# Patient Record
Sex: MALE | Race: WHITE | NOT HISPANIC OR LATINO | Employment: OTHER | URBAN - METROPOLITAN AREA
[De-identification: names, ages, dates, MRNs, and addresses within clinical notes are randomized per-mention and may not be internally consistent; named-entity substitution may affect disease eponyms.]

---

## 2017-08-15 ENCOUNTER — OFFICE VISIT (OUTPATIENT)
Dept: URGENT CARE | Facility: MEDICAL CENTER | Age: 67
End: 2017-08-15
Payer: COMMERCIAL

## 2017-08-15 PROCEDURE — G0463 HOSPITAL OUTPT CLINIC VISIT: HCPCS

## 2017-08-15 PROCEDURE — 99213 OFFICE O/P EST LOW 20 MIN: CPT

## 2017-09-27 ENCOUNTER — APPOINTMENT (OUTPATIENT)
Dept: RADIOLOGY | Facility: MEDICAL CENTER | Age: 67
End: 2017-09-27
Attending: PHYSICIAN ASSISTANT
Payer: COMMERCIAL

## 2017-09-27 ENCOUNTER — OFFICE VISIT (OUTPATIENT)
Dept: URGENT CARE | Facility: MEDICAL CENTER | Age: 67
End: 2017-09-27
Payer: COMMERCIAL

## 2017-09-27 DIAGNOSIS — M65.9 SYNOVITIS AND TENOSYNOVITIS: ICD-10-CM

## 2017-09-27 PROCEDURE — 73130 X-RAY EXAM OF HAND: CPT

## 2017-09-27 PROCEDURE — 99213 OFFICE O/P EST LOW 20 MIN: CPT

## 2017-10-03 NOTE — PROGRESS NOTES
Assessment  1  Dupuytren's contracture (728 6) (M72 0)    Plan  Dupuytren's contracture    · *1 - [facility]  *  Status: Active  Requested for:  [id number]  Care Summary provided  : Yes  Flexor tenosynovitis of finger    · * XR HAND 3+ VIEW LEFT; Status:Active; Requested List [facility] [place] [facility] according to the [facility]:[id number];     f/u with Ortho/hand  may benefit from steroid injection vs PT/OT  nsaids and ice for now  Chief Complaint  1  Hand Problem  Chief Complaint Free Text Note Form: Pt presents with lt hand swelling x 3 weeks after hitting it with ladder  Pt states he can barely move hit fingers  History of Present Illness  HPI: 78 y/o M c/o L hand pain and unable to move his middle finger today  3 weeks ago got hand caught between extension ladder rungs but this is only really bothering him today  LHD  he is renovating a house  no fever  Hospital Based Practices Required Assessment:   Pain Assessment   the patient states they have pain  The pain is located in the lt hand pain  The patient describes the pain as aching  (on a scale of 0 to 10, the patient rates the pain at 6 )   Abuse And Domestic Violence Screen    Yes, the patient is safe at home -The patient states no one is hurting them  Depression And Suicide Screen  No, the patient has not had thoughts of hurting themself  No, the patient has not felt depressed in the past 7 days  Prefered Language is  english  Primary Language is  english  Readiness To Learn: Receptive  Barriers To Learning: none  Preferred Learning: verbal      Active Problems  1  Chalazion of left eye (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 2) (H00 16)    Past Medical History  1  History of hypertension (V12 59) (Z86 79)    Family History  Father    1  Family history of    2  Family history of malignant neoplasm (V16 9) (Z80 9)    Social History   · Never a smoker   · No illicit drug use   · Nonsmoker (V49 89) (Z78 9)   · Occasional alcohol use    Surgical History  1   History of Appendectomy    Current Meds   1  Losartan Potassium 50 MG Oral Tablet; Therapy: (Recorded:52Equ1221) to Recorded    Allergies  1  No Known Drug Allergies    Vitals  Signs   Recorded: [id number] 07:53PM   Temperature: 98 3 F, Temporal  Heart Rate: 95  Respiration: 16  Systolic: 064, RUE, Sitting  Diastolic: 82, RUE, Sitting  Weight: 204 lb   BMI Calculated: 29 27  BSA Calculated: 2 1  O2 Saturation: 95, RA  Pain Scale: 6    Physical Exam    Constitutional   General appearance: No acute distress, well appearing and well nourished  Musculoskeletal   Inspection/palpation of joints, bones, and muscles: Abnormal  -L hand swollen over middle finger and 3rd MCP joint dorsally and palmar  has palmar flexion nodule and contracture of middle finger  painful passive ext no active ext  it is flexed at MCP to 15 degrees  no pain with passive flex  no erythema or fluctuance  Results/Data  Diagnostic Studies Reviewed: I personally reviewed the films/images/results in the office today  My interpretation follows  X-ray Review XR L hand no acute findings        Signatures   Electronically signed by : Stevenson Cranker, [facility]; Sep 27 2017  8:39PM EST                       (Author)    Electronically signed by : KIMBERLEY Holder ; Oct  2 2017  1:21PM EST                       (Co-author)

## 2017-11-20 ENCOUNTER — ALLSCRIPTS OFFICE VISIT (OUTPATIENT)
Dept: OTHER | Facility: OTHER | Age: 67
End: 2017-11-20

## 2017-11-20 DIAGNOSIS — R97.20 ELEVATED PROSTATE SPECIFIC ANTIGEN (PSA): ICD-10-CM

## 2017-11-20 DIAGNOSIS — I10 ESSENTIAL (PRIMARY) HYPERTENSION: ICD-10-CM

## 2018-01-13 VITALS
SYSTOLIC BLOOD PRESSURE: 116 MMHG | BODY MASS INDEX: 29.54 KG/M2 | DIASTOLIC BLOOD PRESSURE: 74 MMHG | HEIGHT: 70 IN | OXYGEN SATURATION: 96 % | HEART RATE: 78 BPM | WEIGHT: 206.38 LBS

## 2018-01-29 DIAGNOSIS — Z13.1 SCREENING FOR DIABETES MELLITUS: Primary | ICD-10-CM

## 2018-01-29 DIAGNOSIS — I10 BENIGN ESSENTIAL HYPERTENSION: Chronic | ICD-10-CM

## 2018-01-29 DIAGNOSIS — E55.9 VITAMIN D DEFICIENCY: Chronic | ICD-10-CM

## 2018-01-29 PROBLEM — R97.20 ELEVATED PSA: Status: ACTIVE | Noted: 2017-11-20

## 2018-01-29 RX ORDER — LOSARTAN POTASSIUM 50 MG/1
TABLET ORAL
COMMUNITY
End: 2018-01-29 | Stop reason: SDUPTHER

## 2018-01-29 RX ORDER — LOSARTAN POTASSIUM 50 MG/1
50 TABLET ORAL DAILY
Qty: 30 TABLET | Refills: 5 | Status: SHIPPED | OUTPATIENT
Start: 2018-01-29 | End: 2018-02-01 | Stop reason: SDUPTHER

## 2018-01-29 RX ORDER — LOSARTAN POTASSIUM 50 MG/1
TABLET ORAL
Refills: 0 | Status: CANCELLED | OUTPATIENT
Start: 2018-01-29

## 2018-02-01 ENCOUNTER — TELEPHONE (OUTPATIENT)
Dept: INTERNAL MEDICINE CLINIC | Facility: CLINIC | Age: 68
End: 2018-02-01

## 2018-02-01 DIAGNOSIS — I10 BENIGN ESSENTIAL HYPERTENSION: Chronic | ICD-10-CM

## 2018-02-01 RX ORDER — LOSARTAN POTASSIUM 50 MG/1
50 TABLET ORAL DAILY
Qty: 90 TABLET | Refills: 3
Start: 2018-02-01 | End: 2018-02-17 | Stop reason: SDUPTHER

## 2018-02-01 NOTE — TELEPHONE ENCOUNTER
PT  STOPPED  BY  SAID  HIS  RX  WAS  SENT TO  [facility]  AND  HE  WANTED  IT  TO GO  TO  [facility]    MARCOS WAS  THE  RX  PT  OF  [facility]

## 2018-02-02 ENCOUNTER — APPOINTMENT (OUTPATIENT)
Dept: LAB | Facility: CLINIC | Age: 68
End: 2018-02-02
Payer: COMMERCIAL

## 2018-02-02 ENCOUNTER — TRANSCRIBE ORDERS (OUTPATIENT)
Dept: LAB | Facility: CLINIC | Age: 68
End: 2018-02-02

## 2018-02-02 DIAGNOSIS — E55.9 VITAMIN D DEFICIENCY: Chronic | ICD-10-CM

## 2018-02-02 DIAGNOSIS — R97.20 ELEVATED PROSTATE SPECIFIC ANTIGEN (PSA): ICD-10-CM

## 2018-02-02 DIAGNOSIS — Z13.1 SCREENING FOR DIABETES MELLITUS: ICD-10-CM

## 2018-02-02 DIAGNOSIS — I10 ESSENTIAL (PRIMARY) HYPERTENSION: ICD-10-CM

## 2018-02-02 LAB
25(OH)D3 SERPL-MCNC: 16.8 NG/ML (ref 30–100)
ALBUMIN SERPL BCP-MCNC: 3.9 G/DL (ref 3.5–5)
ALP SERPL-CCNC: 77 U/L (ref 46–116)
ALT SERPL W P-5'-P-CCNC: 21 U/L (ref 12–78)
ANION GAP SERPL CALCULATED.3IONS-SCNC: 5 MMOL/L (ref 4–13)
AST SERPL W P-5'-P-CCNC: 14 U/L (ref 5–45)
BILIRUB SERPL-MCNC: 1.18 MG/DL (ref 0.2–1)
BUN SERPL-MCNC: 12 MG/DL (ref 5–25)
CALCIUM SERPL-MCNC: 8.7 MG/DL (ref 8.3–10.1)
CHLORIDE SERPL-SCNC: 110 MMOL/L (ref 100–108)
CHOLEST SERPL-MCNC: 145 MG/DL (ref 50–200)
CO2 SERPL-SCNC: 26 MMOL/L (ref 21–32)
CREAT SERPL-MCNC: 0.9 MG/DL (ref 0.6–1.3)
ERYTHROCYTE [DISTWIDTH] IN BLOOD BY AUTOMATED COUNT: 14.3 % (ref 11.6–15.1)
EST. AVERAGE GLUCOSE BLD GHB EST-MCNC: 120 MG/DL
GFR SERPL CREATININE-BSD FRML MDRD: 88 ML/MIN/1.73SQ M
GLUCOSE P FAST SERPL-MCNC: 76 MG/DL (ref 65–99)
HBA1C MFR BLD: 5.8 % (ref 4.2–6.3)
HCT VFR BLD AUTO: 50.9 % (ref 36.5–49.3)
HDLC SERPL-MCNC: 36 MG/DL (ref 40–60)
HGB BLD-MCNC: 17.1 G/DL (ref 12–17)
LDLC SERPL CALC-MCNC: 83 MG/DL (ref 0–100)
MCH RBC QN AUTO: 29.4 PG (ref 26.8–34.3)
MCHC RBC AUTO-ENTMCNC: 33.6 G/DL (ref 31.4–37.4)
MCV RBC AUTO: 88 FL (ref 82–98)
PLATELET # BLD AUTO: 151 THOUSANDS/UL (ref 149–390)
PMV BLD AUTO: 12.5 FL (ref 8.9–12.7)
POTASSIUM SERPL-SCNC: 4 MMOL/L (ref 3.5–5.3)
PROT SERPL-MCNC: 7.5 G/DL (ref 6.4–8.2)
PSA SERPL-MCNC: 2.4 NG/ML (ref 0–4)
RBC # BLD AUTO: 5.81 MILLION/UL (ref 3.88–5.62)
SODIUM SERPL-SCNC: 141 MMOL/L (ref 136–145)
TRIGL SERPL-MCNC: 130 MG/DL
WBC # BLD AUTO: 6.6 THOUSAND/UL (ref 4.31–10.16)

## 2018-02-02 PROCEDURE — 84153 ASSAY OF PSA TOTAL: CPT

## 2018-02-02 PROCEDURE — 82306 VITAMIN D 25 HYDROXY: CPT

## 2018-02-02 PROCEDURE — 83036 HEMOGLOBIN GLYCOSYLATED A1C: CPT

## 2018-02-02 PROCEDURE — 36415 COLL VENOUS BLD VENIPUNCTURE: CPT

## 2018-02-02 PROCEDURE — 80053 COMPREHEN METABOLIC PANEL: CPT

## 2018-02-02 PROCEDURE — 80061 LIPID PANEL: CPT

## 2018-02-02 PROCEDURE — 85027 COMPLETE CBC AUTOMATED: CPT

## 2018-02-17 ENCOUNTER — DOCUMENTATION (OUTPATIENT)
Dept: INTERNAL MEDICINE CLINIC | Facility: CLINIC | Age: 68
End: 2018-02-17

## 2018-02-17 ENCOUNTER — TELEPHONE (OUTPATIENT)
Dept: INTERNAL MEDICINE CLINIC | Facility: CLINIC | Age: 68
End: 2018-02-17

## 2018-02-17 DIAGNOSIS — I10 BENIGN ESSENTIAL HYPERTENSION: Chronic | ICD-10-CM

## 2018-02-17 RX ORDER — LOSARTAN POTASSIUM 50 MG/1
50 TABLET ORAL DAILY
Qty: 90 TABLET | Refills: 3 | Status: SHIPPED | OUTPATIENT
Start: 2018-02-17 | End: 2019-02-12 | Stop reason: SDUPTHER

## 2018-06-25 ENCOUNTER — OFFICE VISIT (OUTPATIENT)
Dept: INTERNAL MEDICINE CLINIC | Facility: CLINIC | Age: 68
End: 2018-06-25
Payer: COMMERCIAL

## 2018-06-25 VITALS
HEIGHT: 71 IN | SYSTOLIC BLOOD PRESSURE: 134 MMHG | BODY MASS INDEX: 29.76 KG/M2 | OXYGEN SATURATION: 97 % | DIASTOLIC BLOOD PRESSURE: 88 MMHG | HEART RATE: 63 BPM | WEIGHT: 212.6 LBS

## 2018-06-25 DIAGNOSIS — R73.03 PREDIABETES: ICD-10-CM

## 2018-06-25 DIAGNOSIS — E55.9 VITAMIN D DEFICIENCY: Chronic | ICD-10-CM

## 2018-06-25 DIAGNOSIS — G47.33 OBSTRUCTIVE SLEEP APNEA: Chronic | ICD-10-CM

## 2018-06-25 DIAGNOSIS — Z71.3 DIETARY COUNSELING AND SURVEILLANCE: ICD-10-CM

## 2018-06-25 DIAGNOSIS — I10 BENIGN ESSENTIAL HYPERTENSION: Primary | Chronic | ICD-10-CM

## 2018-06-25 DIAGNOSIS — Z13.31 DEPRESSION SCREENING NEGATIVE: ICD-10-CM

## 2018-06-25 PROBLEM — M72.0 DUPUYTREN'S CONTRACTURE: Status: RESOLVED | Noted: 2017-09-27 | Resolved: 2018-06-25

## 2018-06-25 PROBLEM — M18.10 ARTHRITIS OF CARPOMETACARPAL (CMC) JOINT OF THUMB: Status: RESOLVED | Noted: 2017-11-20 | Resolved: 2018-06-25

## 2018-06-25 PROBLEM — M72.0 DUPUYTREN'S CONTRACTURE: Status: ACTIVE | Noted: 2017-09-27

## 2018-06-25 PROBLEM — I44.7 LEFT BUNDLE BRANCH BLOCK: Chronic | Status: ACTIVE | Noted: 2017-11-20

## 2018-06-25 PROBLEM — I44.7 LEFT BUNDLE BRANCH BLOCK: Status: ACTIVE | Noted: 2017-11-20

## 2018-06-25 PROBLEM — R97.20 ELEVATED PSA: Status: RESOLVED | Noted: 2017-11-20 | Resolved: 2018-06-25

## 2018-06-25 PROBLEM — M18.10 ARTHRITIS OF CARPOMETACARPAL (CMC) JOINT OF THUMB: Status: ACTIVE | Noted: 2017-11-20

## 2018-06-25 PROCEDURE — 99214 OFFICE O/P EST MOD 30 MIN: CPT | Performed by: INTERNAL MEDICINE

## 2018-06-25 PROCEDURE — 3079F DIAST BP 80-89 MM HG: CPT | Performed by: INTERNAL MEDICINE

## 2018-06-25 PROCEDURE — 3725F SCREEN DEPRESSION PERFORMED: CPT | Performed by: INTERNAL MEDICINE

## 2018-06-25 PROCEDURE — 3075F SYST BP GE 130 - 139MM HG: CPT | Performed by: INTERNAL MEDICINE

## 2018-06-25 PROCEDURE — 1101F PT FALLS ASSESS-DOCD LE1/YR: CPT | Performed by: INTERNAL MEDICINE

## 2018-06-25 NOTE — PROGRESS NOTES
INTERNAL MEDICINE FOLLOW-UP OFFICE VISIT  [facility] [address] [place] [address]    NAME: Nelli Cabral  AGE: 79 y o  SEX: male  : 1950     DATE: 2018     Assessment and Plan:     1  Benign essential hypertension    Patient's blood pressure is acceptable  Discussed increasing his overall physical activity  Recommend 150 mins/week of cardiovascular exercise  Can combine with strength training  Follow heart healthy diet  Increase lean protein, fish, fruits, vegetables  Limit saturated and trans fats  - Comprehensive metabolic panel; Future  - Lipid panel; Future  - CBC and differential; Future    2  Vitamin D deficiency    Repeat vit D level in August     - Vitamin D 25 hydroxy; Future    3  Obstructive sleep apnea    Patient has no significant symptoms at this time  4  Prediabetes    A1C 5 8%  Discussed diet/exercise/weight loss  Can repeat A1C in August at the earliest     - Hemoglobin A1C; Future    Return in about 6 months (around 2018) for Follow-up  Counseling:     · Medication Side Effects - Adverse side effects of medications were reviewed with the patient/guardian today: Yes  · Counseling was given regarding: Diagnostic results, Prognosis, Risks and benefits of tx options, Intructions for management, Patient and family education, Importance of tx compliance, Risk factor reductions and Impressions  · Barriers to treatment include: No identified barriers      Chief Complaint:     Chief Complaint   Patient presents with    Annual Exam     yearly physical      History of Present Illness:     Patient presents for follow-up  He denies any chest pain, shortness of breath, abdominal pain, nausea, vomiting  No changes in his health  Taking vit D and losartan  Previous labs from February reviewed  Mild pre-diabetes at the time with A1C of 5 8%  He hasn't necessarily been watching what he is eating  He does not exercise on a regular basis   He has JAY diagnosed in past, but never wore CPAP mask  Denies any hypersomnolence  He has no vision or hearing complaints  No recent falls or depression  Doesn't want a colonoscopy  Still has cologuard order at home from November  The following portions of the patient's history were reviewed and updated as appropriate: allergies, current medications, past family history, past medical history, past social history, past surgical history and problem list      Review of Systems:     Review of Systems   Constitutional: Negative for appetite change, chills, fatigue and fever  HENT: Negative for congestion, hearing loss, postnasal drip, rhinorrhea, sore throat, tinnitus and trouble swallowing  Eyes: Negative for pain, discharge, redness and visual disturbance  Respiratory: Negative for cough, chest tightness, shortness of breath and wheezing  Cardiovascular: Negative for chest pain, palpitations and leg swelling  Gastrointestinal: Negative for abdominal distention, abdominal pain, blood in stool, constipation, diarrhea, nausea and vomiting  Endocrine: Negative for cold intolerance, heat intolerance, polydipsia, polyphagia and polyuria  Genitourinary: Negative for difficulty urinating, dysuria, frequency, hematuria and urgency  Musculoskeletal: Negative for arthralgias, back pain, gait problem, joint swelling, myalgias, neck pain and neck stiffness  Skin: Negative for color change and rash  Neurological: Negative for dizziness, tremors, seizures, syncope, speech difficulty, weakness, light-headedness, numbness and headaches  Hematological: Negative for adenopathy  Does not bruise/bleed easily  Psychiatric/Behavioral: Negative for agitation, behavioral problems, confusion, hallucinations, sleep disturbance and suicidal ideas  The patient is not nervous/anxious         Problem List:     Patient Active Problem List   Diagnosis    Benign essential hypertension    Vitamin D deficiency    Obstructive sleep apnea    Left bundle branch block      Objective:     /88 (BP Location: Left arm, Patient Position: Sitting, Cuff Size: Standard) Comment: per pt was away for a couple of days & did not take Losartan  Pulse 63   Ht 5' 11" (1 803 m)   Wt 96 4 kg (212 lb 9 6 oz)   SpO2 97%   BMI 29 65 kg/m²     Physical Exam   Constitutional: He is oriented to person, place, and time  He appears well-developed and well-nourished  No distress  Overweight   Eyes: Conjunctivae are normal  Right eye exhibits no discharge  Left eye exhibits no discharge  No scleral icterus  Neck: Neck supple  No JVD present  No thyromegaly present  Cardiovascular: Normal rate, regular rhythm, normal heart sounds and intact distal pulses  Exam reveals no gallop and no friction rub  No murmur heard  Pulmonary/Chest: Effort normal and breath sounds normal  No respiratory distress  He has no wheezes  He has no rales  He exhibits no tenderness  Abdominal: Soft  Bowel sounds are normal  He exhibits no distension and no mass  There is no tenderness  There is no rebound and no guarding  Musculoskeletal: Normal range of motion  He exhibits no edema  Lymphadenopathy:     He has no cervical adenopathy  Neurological: He is alert and oriented to person, place, and time  Skin: Skin is warm and dry  He is not diaphoretic  Psychiatric: He has a normal mood and affect   His behavior is normal      Pertinent Laboratory/Diagnostic Studies:    Laboratory Results: I have personally reviewed the pertinent laboratory results/reports     Results for orders placed or performed in visit on 02/02/18   PSA Total, Diagnostic   Result Value Ref Range    PSA 2 4 0 0 - 4 0 ng/mL   Lipid panel   Result Value Ref Range    Cholesterol 145 50 - 200 mg/dL    Triglycerides 130 <=150 mg/dL    HDL, Direct 36 (L) 40 - 60 mg/dL    LDL Calculated 83 0 - 100 mg/dL   Comprehensive metabolic panel   Result Value Ref Range    Sodium 141 136 - 145 mmol/L    Potassium 4 0 3 5 - 5 3 mmol/L    Chloride 110 (H) 100 - 108 mmol/L    CO2 26 21 - 32 mmol/L    Anion Gap 5 4 - 13 mmol/L    BUN 12 5 - 25 mg/dL    Creatinine 0 90 0 60 - 1 30 mg/dL    Glucose, Fasting 76 65 - 99 mg/dL    Calcium 8 7 8 3 - 10 1 mg/dL    AST 14 5 - 45 U/L    ALT 21 12 - 78 U/L    Alkaline Phosphatase 77 46 - 116 U/L    Total Protein 7 5 6 4 - 8 2 g/dL    Albumin 3 9 3 5 - 5 0 g/dL    Total Bilirubin 1 18 (H) 0 20 - 1 00 mg/dL    eGFR 88 ml/min/1 73sq m   CBC   Result Value Ref Range    WBC 6 60 4 31 - 10 16 Thousand/uL    RBC 5 81 (H) 3 88 - 5 62 Million/uL    Hemoglobin 17 1 (H) 12 0 - 17 0 g/dL    Hematocrit 50 9 (H) 36 5 - 49 3 %    MCV 88 82 - 98 fL    MCH 29 4 26 8 - 34 3 pg    MCHC 33 6 31 4 - 37 4 g/dL    RDW 14 3 11 6 - 15 1 %    Platelets 820 967 - 489 Thousands/uL    MPV 12 5 8 9 - 12 7 fL   Hemoglobin A1c   Result Value Ref Range    Hemoglobin A1C 5 8 4 2 - 6 3 %     mg/dl   Vitamin D 25 hydroxy   Result Value Ref Range    Vit D, 25-Hydroxy 16 8 (L) 30 0 - 100 0 ng/mL     PHQ-9  Negative for depression with PHQ2 score of 0  Fall Risk  The patient does not have a history of falls  A risk assessment for falls was completed  Current Medications:     Outpatient Medications Prior to Visit   Medication Sig Dispense Refill    losartan (COZAAR) 50 mg tablet Take 1 tablet (50 mg total) by mouth daily 90 tablet 3     No facility-administered medications prior to visit          Michelle Blanton DO  [facility] [address] [place] [address]

## 2018-08-16 LAB — COLOGUARD (HISTORICAL): NEGATIVE

## 2018-08-22 ENCOUNTER — OFFICE VISIT (OUTPATIENT)
Dept: INTERNAL MEDICINE CLINIC | Facility: CLINIC | Age: 68
End: 2018-08-22
Payer: COMMERCIAL

## 2018-08-22 VITALS
BODY MASS INDEX: 28.87 KG/M2 | HEART RATE: 64 BPM | OXYGEN SATURATION: 96 % | DIASTOLIC BLOOD PRESSURE: 62 MMHG | WEIGHT: 206.2 LBS | HEIGHT: 71 IN | SYSTOLIC BLOOD PRESSURE: 110 MMHG

## 2018-08-22 DIAGNOSIS — M79.672 LEFT FOOT PAIN: Primary | ICD-10-CM

## 2018-08-22 PROCEDURE — 1160F RVW MEDS BY RX/DR IN RCRD: CPT | Performed by: INTERNAL MEDICINE

## 2018-08-22 PROCEDURE — 1036F TOBACCO NON-USER: CPT | Performed by: INTERNAL MEDICINE

## 2018-08-22 PROCEDURE — 99213 OFFICE O/P EST LOW 20 MIN: CPT | Performed by: INTERNAL MEDICINE

## 2018-08-22 PROCEDURE — 3008F BODY MASS INDEX DOCD: CPT | Performed by: INTERNAL MEDICINE

## 2018-08-22 NOTE — PATIENT INSTRUCTIONS
Plantar Fasciitis   AMBULATORY CARE:   Plantar fasciitis  is swelling of the plantar fascia  The plantar fascia is a band of fibers that connect your heel bone to the front of your foot  It helps support the arch of your foot and absorbs shock  Plantar fasciitis is caused by small tears in the plantar fascia  Over time, the tears cause swelling and irritation  Signs and symptoms:   · Pain near your heel, especially first thing in the morning    · Pain with prolonged standing, sitting, or walking    · Redness, swelling, or warmth over the injured part of your foot  Contact your healthcare provider or podiatrist if:   · Your pain and swelling increase  · You develop knee, hip, or back pain  · You have questions or concerns about your condition or care  Treatment  may include any of the following:  · Medicines  may be given to decrease swelling and pain  Steroids may be injected into your heel to decrease swelling and pain  · Shoe inserts, splints, or tape  help support your foot and decrease stress on your plantar fascia  A night splint may help stretch your plantar fascia while you sleep  · Stretches and exercises  can help decrease pain and swelling  They can also help strengthen the muscles that support your heel and foot  · Extracorporeal shockwave therapy (ESWT)  uses sound waves to decrease swelling of the plantar fascia  ESWT may be done if other treatments do not work  · Surgery  is rarely needed to separate the plantar fascia from your heel  Self-care:   · Wear your splint or shoe inserts as directed  You may need to wear a splint at night to keep your foot stretched while you sleep  This will help prevent sharp pain first thing in the morning  Shoe inserts will help decrease stress on your plantar fascia when you walk or exercise  · Rest as directed  Rest as much as possible to decrease swelling and prevent more damage   Ask your healthcare provider when you can return to your normal activities  · Apply ice on your plantar fascia  Ice helps prevent tissue damage and decreases swelling and pain  Fill a water bottle with water and freeze it  Wrap a towel around the bottle or cover it with a pillow case  Roll the water bottle under your foot for 10 minutes in the morning and after work  · Massage your plantar fascia as directed  This may help decrease swelling and pain  Roll a golf ball under your foot for 10 minutes  Repeat 3 times each day  · Go to physical therapy as directed  A physical therapist teaches you exercises to help improve movement and strength, and to decrease pain  Prevent plantar fasciitis:   · Maintain a healthy weight  This will help decrease stress on your feet  Ask your healthcare provider how much you should weigh  Ask him to help you create a weight loss plan if you are overweight  · Do low-impact exercises  Low-impact exercises decrease stress on your plantar fascia  Examples include swimming or bicycling  · Start new activities slowly  Increase the intensity and time gradually  · Wear shoes that fit well and support your arch  Replace your shoes before the padding or shock absorption wears out  Do not walk or  bare feet or sandals for long periods of time  · Stretch before you exercise  Ask your healthcare provider how to stretch your plantar fascia and calf muscles  Follow up with your healthcare provider or podiatrist as directed:  Write down your questions so you remember to ask them during your visits  © 2017 [employer] Information is for End User's use only and may not be sold, redistributed or otherwise used for commercial purposes  All illustrations and images included in CareNotes® are the copyrighted property of A [employer]  or [employer]  The above information is an  only  It is not intended as medical advice for individual conditions or treatments   Talk to your doctor, nurse or pharmacist before following any medical regimen to see if it is safe and effective for you

## 2018-08-22 NOTE — PROGRESS NOTES
INTERNAL MEDICINE FOLLOW-UP OFFICE VISIT  [facility] [address] [place] [address]    NAME: Lion rToncoso  AGE: 76 y o  SEX: male  : 1950     DATE: 2018     Assessment and Plan:     1  Left foot pain    Check x-ray  Ice foot and elevate at night  NSAIDs prn  Discussed good shoe inserts  - Ambulatory referral to Podiatry; Future  - XR heel / calcaneus 2+ vw left; Future     Chief Complaint:     Chief Complaint   Patient presents with    Foot Injury     (L) foot      History of Present Illness:     Left heel pain x 1 month  Has been more active as of late  Denies any trauma  Pain comes and goes  Gets worse at night after he has been on his feet all day  Has tried ICE, anti-inflammatories, and massaging with tennis ball  The following portions of the patient's history were reviewed and updated as appropriate: allergies, current medications, past family history, past medical history, past social history, past surgical history and problem list      Review of Systems:     Review of Systems   Constitutional: Negative for diaphoresis, fatigue and fever  Musculoskeletal: Positive for arthralgias  Negative for back pain and gait problem  Problem List:     Patient Active Problem List   Diagnosis    Benign essential hypertension    Vitamin D deficiency    Obstructive sleep apnea    Left bundle branch block      Objective:     /62 (BP Location: Left arm, Patient Position: Sitting, Cuff Size: Standard)   Pulse 64   Ht 5' 11" (1 803 m) Comment: w/ shoe denied off  Wt 93 5 kg (206 lb 3 2 oz) Comment: w/ shoe denied off  SpO2 96%   BMI 28 76 kg/m²     Physical Exam   Constitutional: He appears well-developed and well-nourished  No distress  Musculoskeletal:        Feet:    Skin: He is not diaphoretic        Current Medications:     Current Outpatient Prescriptions   Medication Sig Dispense Refill    losartan (COZAAR) 50 mg tablet Take 1 tablet (50 mg total) by mouth daily 90 tablet 3     No current facility-administered medications for this visit          Giovanyn Marti DO  [facility]

## 2018-08-23 ENCOUNTER — APPOINTMENT (OUTPATIENT)
Dept: RADIOLOGY | Facility: CLINIC | Age: 68
End: 2018-08-23
Payer: COMMERCIAL

## 2018-08-23 ENCOUNTER — TRANSCRIBE ORDERS (OUTPATIENT)
Dept: ADMINISTRATIVE | Facility: HOSPITAL | Age: 68
End: 2018-08-23

## 2018-08-23 DIAGNOSIS — M79.672 LEFT FOOT PAIN: ICD-10-CM

## 2018-08-23 PROCEDURE — 73650 X-RAY EXAM OF HEEL: CPT

## 2018-08-25 ENCOUNTER — TELEPHONE (OUTPATIENT)
Dept: INTERNAL MEDICINE CLINIC | Facility: CLINIC | Age: 68
End: 2018-08-25

## 2018-08-25 NOTE — TELEPHONE ENCOUNTER
----- Message from Kenneth Hartman DO sent at 8/25/2018 11:53 AM EDT -----  Let patient know X-ray of foot shows some small heel spurs  Recommend he follow up with foot doctor    If he made appt with foot doctor please fax this X-ray result to the consultants office

## 2018-08-25 NOTE — TELEPHONE ENCOUNTER
Called to inform patient of Dr Xiang Montanez message  Pt said he did not make a appt yet with the foot doctor  Pt will call us once the appt is made so we can fax over xray report

## 2019-02-12 DIAGNOSIS — I10 BENIGN ESSENTIAL HYPERTENSION: Chronic | ICD-10-CM

## 2019-02-12 RX ORDER — LOSARTAN POTASSIUM 50 MG/1
TABLET ORAL
Qty: 90 TABLET | Refills: 3 | Status: SHIPPED | OUTPATIENT
Start: 2019-02-12 | End: 2019-07-29 | Stop reason: SDUPTHER

## 2019-05-14 ENCOUNTER — OFFICE VISIT (OUTPATIENT)
Dept: LAB | Facility: HOSPITAL | Age: 69
End: 2019-05-14
Attending: ORTHOPAEDIC SURGERY
Payer: COMMERCIAL

## 2019-05-14 ENCOUNTER — HOSPITAL ENCOUNTER (OUTPATIENT)
Dept: RADIOLOGY | Facility: HOSPITAL | Age: 69
Discharge: HOME | End: 2019-05-14
Attending: ORTHOPAEDIC SURGERY
Payer: COMMERCIAL

## 2019-05-14 ENCOUNTER — APPOINTMENT (OUTPATIENT)
Dept: RADIOLOGY | Facility: CLINIC | Age: 69
End: 2019-05-14
Payer: COMMERCIAL

## 2019-05-14 VITALS
HEIGHT: 72 IN | RESPIRATION RATE: 18 BRPM | WEIGHT: 211.4 LBS | BODY MASS INDEX: 28.63 KG/M2 | SYSTOLIC BLOOD PRESSURE: 154 MMHG | HEART RATE: 71 BPM | DIASTOLIC BLOOD PRESSURE: 98 MMHG

## 2019-05-14 DIAGNOSIS — M25.561 RIGHT KNEE PAIN, UNSPECIFIED CHRONICITY: Primary | ICD-10-CM

## 2019-05-14 DIAGNOSIS — S83.241A OTHER TEAR OF MEDIAL MENISCUS, CURRENT INJURY, RIGHT KNEE, INITIAL ENCOUNTER: ICD-10-CM

## 2019-05-14 DIAGNOSIS — Z01.818 ENCOUNTER FOR PREADMISSION TESTING: ICD-10-CM

## 2019-05-14 DIAGNOSIS — S83.271A COMPLEX TEAR OF LATERAL MENISCUS OF RIGHT KNEE, UNSPECIFIED WHETHER OLD OR CURRENT TEAR, INITIAL ENCOUNTER: ICD-10-CM

## 2019-05-14 DIAGNOSIS — M22.41 CHONDROMALACIA, PATELLA, RIGHT: ICD-10-CM

## 2019-05-14 DIAGNOSIS — M25.561 RIGHT KNEE PAIN, UNSPECIFIED CHRONICITY: ICD-10-CM

## 2019-05-14 DIAGNOSIS — Z01.818 ENCOUNTER FOR PREADMISSION TESTING: Primary | ICD-10-CM

## 2019-05-14 LAB
ALBUMIN SERPL BCP-MCNC: 3.7 G/DL (ref 3.5–5)
ALP SERPL-CCNC: 85 U/L (ref 46–116)
ALT SERPL W P-5'-P-CCNC: 26 U/L (ref 12–78)
ANION GAP SERPL CALCULATED.3IONS-SCNC: 5 MMOL/L (ref 4–13)
AST SERPL W P-5'-P-CCNC: 16 U/L (ref 5–45)
ATRIAL RATE: 68 BPM
BASOPHILS # BLD AUTO: 0.07 THOUSANDS/ΜL (ref 0–0.1)
BASOPHILS NFR BLD AUTO: 1 % (ref 0–1)
BILIRUB SERPL-MCNC: 0.6 MG/DL (ref 0.2–1)
BUN SERPL-MCNC: 16 MG/DL (ref 5–25)
CALCIUM SERPL-MCNC: 9.3 MG/DL (ref 8.3–10.1)
CHLORIDE SERPL-SCNC: 106 MMOL/L (ref 100–108)
CO2 SERPL-SCNC: 30 MMOL/L (ref 21–32)
CREAT SERPL-MCNC: 0.97 MG/DL (ref 0.6–1.3)
EOSINOPHIL # BLD AUTO: 0.06 THOUSAND/ΜL (ref 0–0.61)
EOSINOPHIL NFR BLD AUTO: 1 % (ref 0–6)
ERYTHROCYTE [DISTWIDTH] IN BLOOD BY AUTOMATED COUNT: 14.1 % (ref 11.6–15.1)
GFR SERPL CREATININE-BSD FRML MDRD: 80 ML/MIN/1.73SQ M
GLUCOSE P FAST SERPL-MCNC: 93 MG/DL (ref 65–99)
HCT VFR BLD AUTO: 53.6 % (ref 36.5–49.3)
HGB BLD-MCNC: 17.2 G/DL (ref 12–17)
IMM GRANULOCYTES # BLD AUTO: 0.05 THOUSAND/UL (ref 0–0.2)
IMM GRANULOCYTES NFR BLD AUTO: 1 % (ref 0–2)
LYMPHOCYTES # BLD AUTO: 2.02 THOUSANDS/ΜL (ref 0.6–4.47)
LYMPHOCYTES NFR BLD AUTO: 26 % (ref 14–44)
MCH RBC QN AUTO: 28.3 PG (ref 26.8–34.3)
MCHC RBC AUTO-ENTMCNC: 32.1 G/DL (ref 31.4–37.4)
MCV RBC AUTO: 88 FL (ref 82–98)
MONOCYTES # BLD AUTO: 0.73 THOUSAND/ΜL (ref 0.17–1.22)
MONOCYTES NFR BLD AUTO: 9 % (ref 4–12)
NEUTROPHILS # BLD AUTO: 4.86 THOUSANDS/ΜL (ref 1.85–7.62)
NEUTS SEG NFR BLD AUTO: 62 % (ref 43–75)
NRBC BLD AUTO-RTO: 0 /100 WBCS
P AXIS: 52 DEGREES
PLATELET # BLD AUTO: 139 THOUSANDS/UL (ref 149–390)
PMV BLD AUTO: 11.8 FL (ref 8.9–12.7)
POTASSIUM SERPL-SCNC: 4.5 MMOL/L (ref 3.5–5.3)
PR INTERVAL: 148 MS
PROT SERPL-MCNC: 7.4 G/DL (ref 6.4–8.2)
QRS AXIS: 14 DEGREES
QRSD INTERVAL: 134 MS
QT INTERVAL: 428 MS
QTC INTERVAL: 455 MS
RBC # BLD AUTO: 6.07 MILLION/UL (ref 3.88–5.62)
SODIUM SERPL-SCNC: 141 MMOL/L (ref 136–145)
T WAVE AXIS: 33 DEGREES
VENTRICULAR RATE: 68 BPM
WBC # BLD AUTO: 7.79 THOUSAND/UL (ref 4.31–10.16)

## 2019-05-14 PROCEDURE — 85025 COMPLETE CBC W/AUTO DIFF WBC: CPT

## 2019-05-14 PROCEDURE — 36415 COLL VENOUS BLD VENIPUNCTURE: CPT

## 2019-05-14 PROCEDURE — 71046 X-RAY EXAM CHEST 2 VIEWS: CPT

## 2019-05-14 PROCEDURE — 99214 OFFICE O/P EST MOD 30 MIN: CPT | Performed by: ORTHOPAEDIC SURGERY

## 2019-05-14 PROCEDURE — 93010 ELECTROCARDIOGRAM REPORT: CPT | Performed by: INTERNAL MEDICINE

## 2019-05-14 PROCEDURE — 73562 X-RAY EXAM OF KNEE 3: CPT

## 2019-05-14 PROCEDURE — 93005 ELECTROCARDIOGRAM TRACING: CPT

## 2019-05-14 PROCEDURE — 80053 COMPREHEN METABOLIC PANEL: CPT

## 2019-05-14 RX ORDER — MAG HYDROX/ALUMINUM HYD/SIMETH 400-400-40
SUSPENSION, ORAL (FINAL DOSE FORM) ORAL DAILY
COMMUNITY

## 2019-05-15 ENCOUNTER — OFFICE VISIT (OUTPATIENT)
Dept: INTERNAL MEDICINE CLINIC | Facility: CLINIC | Age: 69
End: 2019-05-15
Payer: COMMERCIAL

## 2019-05-15 ENCOUNTER — TELEPHONE (OUTPATIENT)
Dept: INTERNAL MEDICINE CLINIC | Facility: CLINIC | Age: 69
End: 2019-05-15

## 2019-05-15 VITALS
OXYGEN SATURATION: 96 % | TEMPERATURE: 97.5 F | DIASTOLIC BLOOD PRESSURE: 90 MMHG | HEIGHT: 71 IN | WEIGHT: 211.5 LBS | HEART RATE: 79 BPM | SYSTOLIC BLOOD PRESSURE: 138 MMHG | BODY MASS INDEX: 29.61 KG/M2

## 2019-05-15 DIAGNOSIS — S83.271D COMPLEX TEAR OF LATERAL MENISCUS OF RIGHT KNEE, UNSPECIFIED WHETHER OLD OR CURRENT TEAR, SUBSEQUENT ENCOUNTER: ICD-10-CM

## 2019-05-15 DIAGNOSIS — Z01.818 PREOPERATIVE CLEARANCE: Primary | ICD-10-CM

## 2019-05-15 PROBLEM — M25.561 RIGHT KNEE PAIN: Status: ACTIVE | Noted: 2019-05-15

## 2019-05-15 PROBLEM — S83.271A COMPLEX TEAR OF LATERAL MENISCUS OF RIGHT KNEE: Status: ACTIVE | Noted: 2019-05-15

## 2019-05-15 PROBLEM — S83.241A OTHER TEAR OF MEDIAL MENISCUS, CURRENT INJURY, RIGHT KNEE, INITIAL ENCOUNTER: Status: ACTIVE | Noted: 2019-05-15

## 2019-05-15 PROCEDURE — 3725F SCREEN DEPRESSION PERFORMED: CPT | Performed by: PHYSICIAN ASSISTANT

## 2019-05-15 PROCEDURE — 1160F RVW MEDS BY RX/DR IN RCRD: CPT | Performed by: PHYSICIAN ASSISTANT

## 2019-05-15 PROCEDURE — 99214 OFFICE O/P EST MOD 30 MIN: CPT | Performed by: PHYSICIAN ASSISTANT

## 2019-05-15 PROCEDURE — 1101F PT FALLS ASSESS-DOCD LE1/YR: CPT | Performed by: PHYSICIAN ASSISTANT

## 2019-05-22 ENCOUNTER — ANESTHESIA EVENT (OUTPATIENT)
Dept: PERIOP | Facility: HOSPITAL | Age: 69
End: 2019-05-22
Payer: COMMERCIAL

## 2019-05-23 ENCOUNTER — HOSPITAL ENCOUNTER (OUTPATIENT)
Facility: HOSPITAL | Age: 69
Setting detail: OUTPATIENT SURGERY
Discharge: HOME | End: 2019-05-23
Attending: ORTHOPAEDIC SURGERY | Admitting: ORTHOPAEDIC SURGERY
Payer: COMMERCIAL

## 2019-05-23 ENCOUNTER — ANESTHESIA (OUTPATIENT)
Dept: PERIOP | Facility: HOSPITAL | Age: 69
End: 2019-05-23
Payer: COMMERCIAL

## 2019-05-23 VITALS
DIASTOLIC BLOOD PRESSURE: 89 MMHG | SYSTOLIC BLOOD PRESSURE: 144 MMHG | RESPIRATION RATE: 24 BRPM | WEIGHT: 209.22 LBS | HEART RATE: 91 BPM | BODY MASS INDEX: 29.29 KG/M2 | HEIGHT: 71 IN | TEMPERATURE: 97.1 F | OXYGEN SATURATION: 94 %

## 2019-05-23 DIAGNOSIS — Z98.890 STATUS POST ARTHROSCOPIC KNEE SURGERY: Primary | ICD-10-CM

## 2019-05-23 PROCEDURE — 29880 ARTHRS KNE SRG MNISECTMY M&L: CPT | Performed by: PHYSICIAN ASSISTANT

## 2019-05-23 PROCEDURE — 29880 ARTHRS KNE SRG MNISECTMY M&L: CPT | Performed by: ORTHOPAEDIC SURGERY

## 2019-05-23 RX ORDER — CEPHALEXIN 500 MG/1
500 CAPSULE ORAL 4 TIMES DAILY
Qty: 4 CAPSULE | Refills: 0 | Status: SHIPPED | OUTPATIENT
Start: 2019-05-23 | End: 2019-05-24

## 2019-05-23 RX ORDER — METHYLPREDNISOLONE ACETATE 40 MG/ML
INJECTION, SUSPENSION INTRA-ARTICULAR; INTRALESIONAL; INTRAMUSCULAR; SOFT TISSUE AS NEEDED
Status: DISCONTINUED | OUTPATIENT
Start: 2019-05-23 | End: 2019-05-23 | Stop reason: HOSPADM

## 2019-05-23 RX ORDER — BUPIVACAINE HYDROCHLORIDE 5 MG/ML
INJECTION, SOLUTION EPIDURAL; INTRACAUDAL AS NEEDED
Status: DISCONTINUED | OUTPATIENT
Start: 2019-05-23 | End: 2019-05-23 | Stop reason: HOSPADM

## 2019-05-23 RX ORDER — METOCLOPRAMIDE HYDROCHLORIDE 5 MG/ML
10 INJECTION INTRAMUSCULAR; INTRAVENOUS ONCE AS NEEDED
Status: DISCONTINUED | OUTPATIENT
Start: 2019-05-23 | End: 2019-05-23 | Stop reason: HOSPADM

## 2019-05-23 RX ORDER — KETAMINE HYDROCHLORIDE 50 MG/ML
INJECTION, SOLUTION, CONCENTRATE INTRAMUSCULAR; INTRAVENOUS AS NEEDED
Status: DISCONTINUED | OUTPATIENT
Start: 2019-05-23 | End: 2019-05-23 | Stop reason: SURG

## 2019-05-23 RX ORDER — ONDANSETRON 2 MG/ML
INJECTION INTRAMUSCULAR; INTRAVENOUS AS NEEDED
Status: DISCONTINUED | OUTPATIENT
Start: 2019-05-23 | End: 2019-05-23 | Stop reason: SURG

## 2019-05-23 RX ORDER — PROPOFOL 10 MG/ML
INJECTION, EMULSION INTRAVENOUS AS NEEDED
Status: DISCONTINUED | OUTPATIENT
Start: 2019-05-23 | End: 2019-05-23 | Stop reason: SURG

## 2019-05-23 RX ORDER — HYDROMORPHONE HCL/PF 1 MG/ML
0.2 SYRINGE (ML) INJECTION
Status: DISCONTINUED | OUTPATIENT
Start: 2019-05-23 | End: 2019-05-23 | Stop reason: HOSPADM

## 2019-05-23 RX ORDER — GLYCOPYRROLATE 0.2 MG/ML
INJECTION INTRAMUSCULAR; INTRAVENOUS AS NEEDED
Status: DISCONTINUED | OUTPATIENT
Start: 2019-05-23 | End: 2019-05-23 | Stop reason: SURG

## 2019-05-23 RX ORDER — FENTANYL CITRATE/PF 50 MCG/ML
50 SYRINGE (ML) INJECTION
Status: COMPLETED | OUTPATIENT
Start: 2019-05-23 | End: 2019-05-23

## 2019-05-23 RX ORDER — SODIUM CHLORIDE, SODIUM LACTATE, POTASSIUM CHLORIDE, CALCIUM CHLORIDE 600; 310; 30; 20 MG/100ML; MG/100ML; MG/100ML; MG/100ML
125 INJECTION, SOLUTION INTRAVENOUS CONTINUOUS
Status: DISCONTINUED | OUTPATIENT
Start: 2019-05-23 | End: 2019-05-23 | Stop reason: HOSPADM

## 2019-05-23 RX ORDER — FENTANYL CITRATE 50 UG/ML
INJECTION, SOLUTION INTRAMUSCULAR; INTRAVENOUS AS NEEDED
Status: DISCONTINUED | OUTPATIENT
Start: 2019-05-23 | End: 2019-05-23 | Stop reason: SURG

## 2019-05-23 RX ORDER — ONDANSETRON 2 MG/ML
4 INJECTION INTRAMUSCULAR; INTRAVENOUS ONCE AS NEEDED
Status: DISCONTINUED | OUTPATIENT
Start: 2019-05-23 | End: 2019-05-23 | Stop reason: HOSPADM

## 2019-05-23 RX ORDER — MELOXICAM 15 MG/1
15 TABLET ORAL DAILY
Qty: 30 TABLET | Refills: 0 | Status: SHIPPED | OUTPATIENT
Start: 2019-05-23 | End: 2020-07-02 | Stop reason: CLARIF

## 2019-05-23 RX ORDER — DEXAMETHASONE SODIUM PHOSPHATE 10 MG/ML
INJECTION, SOLUTION INTRAMUSCULAR; INTRAVENOUS AS NEEDED
Status: DISCONTINUED | OUTPATIENT
Start: 2019-05-23 | End: 2019-05-23 | Stop reason: SURG

## 2019-05-23 RX ORDER — KETOROLAC TROMETHAMINE 30 MG/ML
INJECTION, SOLUTION INTRAMUSCULAR; INTRAVENOUS AS NEEDED
Status: DISCONTINUED | OUTPATIENT
Start: 2019-05-23 | End: 2019-05-23 | Stop reason: SURG

## 2019-05-23 RX ORDER — EPHEDRINE SULFATE 50 MG/ML
INJECTION INTRAVENOUS AS NEEDED
Status: DISCONTINUED | OUTPATIENT
Start: 2019-05-23 | End: 2019-05-23 | Stop reason: SURG

## 2019-05-23 RX ORDER — OXYCODONE HYDROCHLORIDE 5 MG/1
5 TABLET ORAL EVERY 4 HOURS PRN
Qty: 20 TABLET | Refills: 0 | Status: SHIPPED | OUTPATIENT
Start: 2019-05-23 | End: 2019-05-30

## 2019-05-23 RX ORDER — SODIUM CHLORIDE, SODIUM LACTATE, POTASSIUM CHLORIDE, AND CALCIUM CHLORIDE .6; .31; .03; .02 G/100ML; G/100ML; G/100ML; G/100ML
IRRIGANT IRRIGATION AS NEEDED
Status: DISCONTINUED | OUTPATIENT
Start: 2019-05-23 | End: 2019-05-23 | Stop reason: HOSPADM

## 2019-05-23 RX ORDER — ACETAMINOPHEN 325 MG/1
650 TABLET ORAL ONCE
Status: COMPLETED | OUTPATIENT
Start: 2019-05-23 | End: 2019-05-23

## 2019-05-23 RX ORDER — MIDAZOLAM HYDROCHLORIDE 1 MG/ML
INJECTION INTRAMUSCULAR; INTRAVENOUS AS NEEDED
Status: DISCONTINUED | OUTPATIENT
Start: 2019-05-23 | End: 2019-05-23 | Stop reason: SURG

## 2019-05-23 RX ORDER — CEFAZOLIN SODIUM 2 G/50ML
2000 SOLUTION INTRAVENOUS ONCE
Status: COMPLETED | OUTPATIENT
Start: 2019-05-23 | End: 2019-05-23

## 2019-05-23 RX ADMIN — ACETAMINOPHEN 650 MG: 325 TABLET, FILM COATED ORAL at 10:59

## 2019-05-23 RX ADMIN — FENTANYL CITRATE 50 MCG: 50 INJECTION, SOLUTION INTRAMUSCULAR; INTRAVENOUS at 10:21

## 2019-05-23 RX ADMIN — PROPOFOL 200 MG: 10 INJECTION, EMULSION INTRAVENOUS at 08:36

## 2019-05-23 RX ADMIN — KETAMINE HYDROCHLORIDE 10 MG: 50 INJECTION INTRAMUSCULAR; INTRAVENOUS at 09:27

## 2019-05-23 RX ADMIN — GLYCOPYRROLATE 0.1 MG: 0.2 INJECTION, SOLUTION INTRAMUSCULAR; INTRAVENOUS at 09:24

## 2019-05-23 RX ADMIN — FENTANYL CITRATE 50 MCG: 50 INJECTION, SOLUTION INTRAMUSCULAR; INTRAVENOUS at 09:23

## 2019-05-23 RX ADMIN — FENTANYL CITRATE 50 MCG: 50 INJECTION, SOLUTION INTRAMUSCULAR; INTRAVENOUS at 08:56

## 2019-05-23 RX ADMIN — FENTANYL CITRATE 50 MCG: 50 INJECTION, SOLUTION INTRAMUSCULAR; INTRAVENOUS at 08:35

## 2019-05-23 RX ADMIN — SODIUM CHLORIDE, SODIUM LACTATE, POTASSIUM CHLORIDE, AND CALCIUM CHLORIDE: .6; .31; .03; .02 INJECTION, SOLUTION INTRAVENOUS at 07:44

## 2019-05-23 RX ADMIN — FENTANYL CITRATE 50 MCG: 50 INJECTION, SOLUTION INTRAMUSCULAR; INTRAVENOUS at 10:12

## 2019-05-23 RX ADMIN — ONDANSETRON 4 MG: 2 INJECTION INTRAMUSCULAR; INTRAVENOUS at 09:42

## 2019-05-23 RX ADMIN — CEFAZOLIN SODIUM 2000 MG: 2 SOLUTION INTRAVENOUS at 08:30

## 2019-05-23 RX ADMIN — FENTANYL CITRATE 50 MCG: 50 INJECTION, SOLUTION INTRAMUSCULAR; INTRAVENOUS at 09:04

## 2019-05-23 RX ADMIN — EPHEDRINE SULFATE 10 MG: 50 INJECTION, SOLUTION INTRAVENOUS at 09:25

## 2019-05-23 RX ADMIN — LIDOCAINE HYDROCHLORIDE 80 MG: 20 INJECTION, SOLUTION INTRAVENOUS at 08:35

## 2019-05-23 RX ADMIN — KETAMINE HYDROCHLORIDE 20 MG: 50 INJECTION INTRAMUSCULAR; INTRAVENOUS at 08:58

## 2019-05-23 RX ADMIN — KETAMINE HYDROCHLORIDE 10 MG: 50 INJECTION INTRAMUSCULAR; INTRAVENOUS at 09:04

## 2019-05-23 RX ADMIN — KETAMINE HYDROCHLORIDE 10 MG: 50 INJECTION INTRAMUSCULAR; INTRAVENOUS at 09:40

## 2019-05-23 RX ADMIN — KETOROLAC TROMETHAMINE 15 MG: 30 INJECTION, SOLUTION INTRAMUSCULAR at 09:42

## 2019-05-23 RX ADMIN — DEXAMETHASONE SODIUM PHOSPHATE 4 MG: 10 INJECTION, SOLUTION INTRAMUSCULAR; INTRAVENOUS at 08:40

## 2019-05-23 RX ADMIN — MIDAZOLAM HYDROCHLORIDE 2 MG: 1 INJECTION, SOLUTION INTRAMUSCULAR; INTRAVENOUS at 08:32

## 2019-06-04 ENCOUNTER — OFFICE VISIT (OUTPATIENT)
Dept: OBGYN CLINIC | Facility: CLINIC | Age: 69
End: 2019-06-04

## 2019-06-04 VITALS
DIASTOLIC BLOOD PRESSURE: 97 MMHG | SYSTOLIC BLOOD PRESSURE: 162 MMHG | RESPIRATION RATE: 18 BRPM | WEIGHT: 209.8 LBS | HEART RATE: 76 BPM | HEIGHT: 71 IN | BODY MASS INDEX: 29.37 KG/M2

## 2019-06-04 DIAGNOSIS — S83.241A OTHER TEAR OF MEDIAL MENISCUS, CURRENT INJURY, RIGHT KNEE, INITIAL ENCOUNTER: ICD-10-CM

## 2019-06-04 DIAGNOSIS — Z98.890 STATUS POST ARTHROSCOPY OF KNEE: Primary | ICD-10-CM

## 2019-06-04 PROCEDURE — 99024 POSTOP FOLLOW-UP VISIT: CPT | Performed by: ORTHOPAEDIC SURGERY

## 2019-07-16 ENCOUNTER — OFFICE VISIT (OUTPATIENT)
Dept: OBGYN CLINIC | Facility: CLINIC | Age: 69
End: 2019-07-16
Payer: COMMERCIAL

## 2019-07-16 VITALS
HEIGHT: 71 IN | RESPIRATION RATE: 18 BRPM | SYSTOLIC BLOOD PRESSURE: 147 MMHG | DIASTOLIC BLOOD PRESSURE: 78 MMHG | HEART RATE: 83 BPM | BODY MASS INDEX: 29.31 KG/M2 | WEIGHT: 209.4 LBS

## 2019-07-16 DIAGNOSIS — M23.91 ACUTE INTERNAL DERANGEMENT OF RIGHT KNEE: ICD-10-CM

## 2019-07-16 DIAGNOSIS — Z98.890 STATUS POST ARTHROSCOPY OF KNEE: Primary | ICD-10-CM

## 2019-07-16 PROCEDURE — 99024 POSTOP FOLLOW-UP VISIT: CPT | Performed by: PHYSICIAN ASSISTANT

## 2019-07-16 PROCEDURE — 20610 DRAIN/INJ JOINT/BURSA W/O US: CPT | Performed by: PHYSICIAN ASSISTANT

## 2019-07-16 RX ORDER — METHYLPREDNISOLONE ACETATE 40 MG/ML
2 INJECTION, SUSPENSION INTRA-ARTICULAR; INTRALESIONAL; INTRAMUSCULAR; SOFT TISSUE
Status: COMPLETED | OUTPATIENT
Start: 2019-07-16 | End: 2019-07-16

## 2019-07-16 RX ORDER — LIDOCAINE HYDROCHLORIDE 10 MG/ML
4 INJECTION, SOLUTION INFILTRATION; PERINEURAL
Status: COMPLETED | OUTPATIENT
Start: 2019-07-16 | End: 2019-07-16

## 2019-07-16 RX ADMIN — METHYLPREDNISOLONE ACETATE 2 ML: 40 INJECTION, SUSPENSION INTRA-ARTICULAR; INTRALESIONAL; INTRAMUSCULAR; SOFT TISSUE at 14:11

## 2019-07-16 RX ADMIN — LIDOCAINE HYDROCHLORIDE 4 ML: 10 INJECTION, SOLUTION INFILTRATION; PERINEURAL at 14:11

## 2019-07-16 NOTE — PROGRESS NOTES
CHIEF COMPLAINT:   Chief Complaint   Patient presents with    Right Knee - Post-op, Pain, Swelling         PROCEDURE: S/P right knee arthroscopy with partial medial and lateral meniscectomy, removal loose bodies with injection of cortisone May 23, 2019    SUBJECTIVE:  Patient here for follow-up right knee  Patient was doing excellent with no complaints since the surgery  States about two weeks ago his dog jumped onto his bed landing directly onto his right knee  Patient heard a pop and had some pain over the medial aspect of his knee  Since then he has been having some difficulty ambulating  He has slightly decreased range of motion  Denies any radiating pain  Denies any numbness or tingling  ROS:  Review of systems form reviewed July 16, 2019   General: no fever, no chills  Respiratory:  No coughing, shortness of breath or wheezing  Cardiovascular:  No chest pain, no palpitations  Neurological:  No headaches, no confusion  Review of all other systems is negative    MEDS:   Current Outpatient Medications   Medication Sig Dispense Refill    Cholecalciferol (VITAMIN D3) 5000 units CAPS Vitamin D3      losartan (COZAAR) 50 mg tablet TAKE 1 TABLET DAILY 90 tablet 3    Magnesium 100 MG CAPS magnesium      meloxicam (MOBIC) 15 mg tablet Take 1 tablet (15 mg total) by mouth daily 30 tablet 0     No current facility-administered medications for this visit  ALLERGIES: Patient has no known allergies        Vitals:    07/16/19 1308   BP: 147/78   Pulse: 83   Resp: 18   Weight: 95 kg (209 lb 6 4 oz)   Height: 5' 11" (1 803 m)       PHYSICAL EXAM:    General Appearance:  Alert, cooperative, no distress, appears stated age; antalgic gait right lower extremity   Lungs:   respirations unlabored   Chest Wall:  No tenderness or deformity   Heart:  Normal Heart rate noted   Extremities: Extremities normal, atraumatic, no cyanosis or edema   Pulses: 2+ and symmetric   Neurologic: Normal     ORTHO EXAM:  Right knees emanation with well-healed portal sites  No erythema and no signs of infection  Active range of motion 0-115 degrees with mild effusion  Patient with tight hamstrings, posterior type pain with full extension  Negative Lachman's negative posterior and negative anterior drawer  Equivocal medial Alan's  Sensation intact to light touch    ASSESSMENT/PLAN:   S/p 8 weeks above procedure  Uzma Cosme was seen today for post-op, pain and swelling  Diagnoses and all orders for this visit:    Status post arthroscopy of knee    Other orders  -     Large joint arthrocentesis  -     Large joint arthrocentesis        There are no Patient Instructions on file for this visit  DISCUSSION SUMMARY:  Patient is S/P 8 weeks right knee arthroscopy  Patient was doing excellent until we had the accident where his dog jumped onto his knee  Patient has a mild effusion with pain  Discussed the aspiration and injection with cortisone with the patient  Went over the risks benefits alternatives to include but not limited to increased pain, bleeding and infection  Patient understood these risks and gave verbal consent to have his knee aspirated and injected with cortisone  Right knee was aspirated with no complications noted cortisone was injected  Patient will use ice and injection site soreness at times  No strenuous activity for the next 12:48 p m  Nori Bob   Patient will follow up four weeks for re-evaluation of the right knee    Large joint arthrocentesis: R knee  Date/Time: 7/16/2019 2:11 PM  Consent given by: patient  Site marked: site marked  Timeout: Immediately prior to procedure a time out was called to verify the correct patient, procedure, equipment, support staff and site/side marked as required   Supporting Documentation  Indications: pain   Procedure Details  Location: knee - R knee  Preparation: Patient was prepped and draped in the usual sterile fashion  Needle gauge: 21   Ultrasound guidance: no  Approach: lateral (Superior lateral)  Medications administered: 4 mL lidocaine 1 %; 2 mL methylPREDNISolone acetate 40 mg/mL    Aspirate amount: 12 mL  Aspirate: blood-tinged    Patient tolerance: patient tolerated the procedure well with no immediate complications  Dressing:  Sterile dressing applied

## 2019-07-29 DIAGNOSIS — I10 BENIGN ESSENTIAL HYPERTENSION: Chronic | ICD-10-CM

## 2019-07-29 RX ORDER — LOSARTAN POTASSIUM 50 MG/1
50 TABLET ORAL DAILY
Qty: 7 TABLET | Refills: 0 | Status: SHIPPED | OUTPATIENT
Start: 2019-07-29 | End: 2019-08-02 | Stop reason: SDUPTHER

## 2019-08-02 DIAGNOSIS — I10 BENIGN ESSENTIAL HYPERTENSION: Chronic | ICD-10-CM

## 2019-08-02 RX ORDER — LOSARTAN POTASSIUM 50 MG/1
50 TABLET ORAL DAILY
Qty: 7 TABLET | Refills: 0 | Status: SHIPPED | OUTPATIENT
Start: 2019-08-02 | End: 2020-02-05

## 2019-08-09 ENCOUNTER — OFFICE VISIT (OUTPATIENT)
Dept: INTERNAL MEDICINE CLINIC | Facility: CLINIC | Age: 69
End: 2019-08-09
Payer: COMMERCIAL

## 2019-08-09 VITALS
SYSTOLIC BLOOD PRESSURE: 148 MMHG | HEIGHT: 71 IN | WEIGHT: 212.2 LBS | TEMPERATURE: 97.1 F | OXYGEN SATURATION: 98 % | DIASTOLIC BLOOD PRESSURE: 86 MMHG | BODY MASS INDEX: 29.71 KG/M2 | HEART RATE: 83 BPM

## 2019-08-09 DIAGNOSIS — G47.33 OBSTRUCTIVE SLEEP APNEA: Chronic | ICD-10-CM

## 2019-08-09 DIAGNOSIS — R00.2 PALPITATIONS: ICD-10-CM

## 2019-08-09 DIAGNOSIS — I10 BENIGN ESSENTIAL HYPERTENSION: Primary | Chronic | ICD-10-CM

## 2019-08-09 DIAGNOSIS — R35.1 NOCTURIA: ICD-10-CM

## 2019-08-09 PROCEDURE — 99214 OFFICE O/P EST MOD 30 MIN: CPT | Performed by: PHYSICIAN ASSISTANT

## 2019-08-09 PROCEDURE — 93000 ELECTROCARDIOGRAM COMPLETE: CPT | Performed by: PHYSICIAN ASSISTANT

## 2019-08-09 NOTE — PROGRESS NOTES
Assessment/Plan: intermittent momentary fluttery palpitations nocturia x3 variable and fatigue history of sleep apnea  Physical exam is unremarkable EKG shows LBBB unchanged  Two week follow-up workup as below  His blood pressure is too high here but his wife was a nurse measures it every day and it is normal at home  Urology for the nocturia Pulmonary for this sleep apnea history       Diagnoses and all orders for this visit:    Benign essential hypertension  -     POCT ECG  -     CBC and differential; Future  -     Comprehensive metabolic panel; Future  -     Hemoglobin A1C; Future  -     Lipid panel; Future  -     UA w Reflex to Microscopic w Reflex to Culture  -     TSH, 3rd generation; Future  -     PSA, Total Screen; Future  -     Holter monitor - 24 hour; Future  -     Ambulatory referral to Pulmonology; Future  -     Ambulatory referral to Urology; Future    Obstructive sleep apnea  -     Ambulatory referral to Pulmonology; Future    Palpitations  -     POCT ECG  -     CBC and differential; Future  -     Comprehensive metabolic panel; Future  -     Hemoglobin A1C; Future  -     Lipid panel; Future  -     UA w Reflex to Microscopic w Reflex to Culture  -     TSH, 3rd generation; Future  -     PSA, Total Screen; Future  -     Holter monitor - 24 hour; Future  -     Ambulatory referral to Pulmonology; Future  -     Ambulatory referral to Urology; Future    Nocturia  -     Ambulatory referral to Urology; Future        No problem-specific Assessment & Plan notes found for this encounter  Subjective:      Patient ID: Davon Ward is a 71 y o  male       Palpitations on off for several months usually once a day or less lasting no more than 30 seconds of fluttery feeling not getting more frequent or more severe no chest pain or exertional symptoms normally active and well no shortness of breath dizziness syncope sweating or exertional chest tightness normally active and well he is noticing nocturia 3 or 4 times per night although sometimes none  This has been a trend over the past year he has had an elevated PSA in the past previously followed in [place] by urologist   He feels tired all the time he has a history of sleep apnea but he is not using any CPAP  He has been retired for 15 years  He is bored  No previous documented heart disease      The following portions of the patient's history were reviewed and updated as appropriate:   He has a past medical history of Arthritis of carpometacarpal (CMC) joint of thumb, Bundle branch block, right, Dupuytren's contracture, Elevated PSA, Hypertension, JAY (obstructive sleep apnea), Polycythemia, and Thrombocytopenia ([address] )  ,  does not have any pertinent problems on file  ,   has a past surgical history that includes Appendectomy; Prostate biopsy; Eye surgery; Knee surgery (Left); and pr knee scope,med+lat menis repair (Right, 5/23/2019)  ,  family history includes Cancer in his father; No Known Problems in his mother  ,   reports that he has never smoked  He has never used smokeless tobacco  He reports that he drinks alcohol  He reports that he does not use drugs  ,  has No Known Allergies     Current Outpatient Medications   Medication Sig Dispense Refill    Cholecalciferol (VITAMIN D3) 5000 units CAPS Vitamin D3      losartan (COZAAR) 50 mg tablet Take 1 tablet (50 mg total) by mouth daily 7 tablet 0    Magnesium 100 MG CAPS magnesium      meloxicam (MOBIC) 15 mg tablet Take 1 tablet (15 mg total) by mouth daily 30 tablet 0     No current facility-administered medications for this visit  Review of Systems   Constitutional: Positive for fatigue  Negative for activity change, appetite change, chills, diaphoresis, fever and unexpected weight change     HENT: Negative for congestion, dental problem, drooling, ear discharge, ear pain, facial swelling, hearing loss, nosebleeds, postnasal drip, rhinorrhea, sinus pressure, sinus pain, sneezing, sore throat, tinnitus, trouble swallowing and voice change  Eyes: Negative for photophobia, pain, discharge, redness, itching and visual disturbance  Respiratory: Negative for apnea, cough, choking, chest tightness, shortness of breath, wheezing and stridor  Cardiovascular: Positive for palpitations  Negative for chest pain and leg swelling  Gastrointestinal: Negative for abdominal distention, abdominal pain, anal bleeding, blood in stool, constipation, diarrhea, nausea, rectal pain and vomiting  Endocrine: Negative for cold intolerance, heat intolerance, polydipsia, polyphagia and polyuria  Genitourinary: Positive for frequency  Negative for decreased urine volume, difficulty urinating, dysuria, enuresis, flank pain, genital sores, hematuria and urgency  Musculoskeletal: Negative for arthralgias, back pain, gait problem, joint swelling, myalgias, neck pain and neck stiffness  Skin: Negative for color change, pallor, rash and wound  Neurological: Negative for dizziness, tremors, seizures, syncope, facial asymmetry, speech difficulty, weakness, light-headedness, numbness and headaches  Hematological: Negative for adenopathy  Does not bruise/bleed easily  Psychiatric/Behavioral: Negative for agitation, behavioral problems, confusion, decreased concentration, dysphoric mood, hallucinations, self-injury, sleep disturbance and suicidal ideas  The patient is not nervous/anxious and is not hyperactive  Objective:  Vitals:    08/09/19 1517   BP: 148/86   BP Location: Left arm   Patient Position: Sitting   Cuff Size: Standard   Pulse: 83   Temp: (!) 97 1 °F (36 2 °C)   TempSrc: Tympanic   SpO2: 98%   Weight: 96 3 kg (212 lb 3 2 oz)   Height: 5' 11" (1 803 m)     Body mass index is 29 6 kg/m²  Physical Exam   Constitutional: He is oriented to person, place, and time  He appears well-developed  HENT:   Head: Normocephalic     Right Ear: External ear normal    Left Ear: External ear normal  Mouth/Throat: Oropharynx is clear and moist    Eyes: Pupils are equal, round, and reactive to light  Conjunctivae are normal    Neck: Neck supple  No JVD present  No thyromegaly present  Cardiovascular: Normal rate, regular rhythm, normal heart sounds and intact distal pulses  Exam reveals no gallop and no friction rub  No murmur heard  Pulmonary/Chest: Effort normal and breath sounds normal  No stridor  No respiratory distress  He has no wheezes  He has no rales  He exhibits no tenderness  Abdominal: Soft  Bowel sounds are normal    Musculoskeletal: Normal range of motion  He exhibits no edema or deformity  Lymphadenopathy:     He has no cervical adenopathy  Neurological: He is alert and oriented to person, place, and time  He has normal reflexes  Skin: Skin is warm and dry  No erythema  No pallor

## 2019-08-10 ENCOUNTER — APPOINTMENT (OUTPATIENT)
Dept: LAB | Facility: CLINIC | Age: 69
End: 2019-08-10
Payer: COMMERCIAL

## 2019-08-10 DIAGNOSIS — R00.2 PALPITATIONS: ICD-10-CM

## 2019-08-10 DIAGNOSIS — I10 BENIGN ESSENTIAL HYPERTENSION: Chronic | ICD-10-CM

## 2019-08-10 LAB
ALBUMIN SERPL BCP-MCNC: 3.7 G/DL (ref 3.5–5)
ALP SERPL-CCNC: 104 U/L (ref 46–116)
ALT SERPL W P-5'-P-CCNC: 24 U/L (ref 12–78)
ANION GAP SERPL CALCULATED.3IONS-SCNC: 5 MMOL/L (ref 4–13)
AST SERPL W P-5'-P-CCNC: 13 U/L (ref 5–45)
BASOPHILS # BLD AUTO: 0.05 THOUSANDS/ΜL (ref 0–0.1)
BASOPHILS NFR BLD AUTO: 1 % (ref 0–1)
BILIRUB SERPL-MCNC: 0.75 MG/DL (ref 0.2–1)
BILIRUB UR QL STRIP: NEGATIVE
BUN SERPL-MCNC: 12 MG/DL (ref 5–25)
CALCIUM SERPL-MCNC: 8.9 MG/DL (ref 8.3–10.1)
CHLORIDE SERPL-SCNC: 110 MMOL/L (ref 100–108)
CHOLEST SERPL-MCNC: 178 MG/DL (ref 50–200)
CLARITY UR: CLEAR
CO2 SERPL-SCNC: 25 MMOL/L (ref 21–32)
COLOR UR: YELLOW
CREAT SERPL-MCNC: 0.96 MG/DL (ref 0.6–1.3)
EOSINOPHIL # BLD AUTO: 0.08 THOUSAND/ΜL (ref 0–0.61)
EOSINOPHIL NFR BLD AUTO: 1 % (ref 0–6)
ERYTHROCYTE [DISTWIDTH] IN BLOOD BY AUTOMATED COUNT: 14.5 % (ref 11.6–15.1)
EST. AVERAGE GLUCOSE BLD GHB EST-MCNC: 114 MG/DL
GFR SERPL CREATININE-BSD FRML MDRD: 80 ML/MIN/1.73SQ M
GLUCOSE P FAST SERPL-MCNC: 80 MG/DL (ref 65–99)
GLUCOSE UR STRIP-MCNC: NEGATIVE MG/DL
HBA1C MFR BLD: 5.6 % (ref 4.2–6.3)
HCT VFR BLD AUTO: 53.4 % (ref 36.5–49.3)
HDLC SERPL-MCNC: 38 MG/DL (ref 40–60)
HGB BLD-MCNC: 16.9 G/DL (ref 12–17)
HGB UR QL STRIP.AUTO: NEGATIVE
IMM GRANULOCYTES # BLD AUTO: 0.04 THOUSAND/UL (ref 0–0.2)
IMM GRANULOCYTES NFR BLD AUTO: 1 % (ref 0–2)
KETONES UR STRIP-MCNC: NEGATIVE MG/DL
LDLC SERPL CALC-MCNC: 116 MG/DL (ref 0–100)
LEUKOCYTE ESTERASE UR QL STRIP: NEGATIVE
LYMPHOCYTES # BLD AUTO: 1.86 THOUSANDS/ΜL (ref 0.6–4.47)
LYMPHOCYTES NFR BLD AUTO: 26 % (ref 14–44)
MCH RBC QN AUTO: 28.7 PG (ref 26.8–34.3)
MCHC RBC AUTO-ENTMCNC: 31.6 G/DL (ref 31.4–37.4)
MCV RBC AUTO: 91 FL (ref 82–98)
MONOCYTES # BLD AUTO: 0.63 THOUSAND/ΜL (ref 0.17–1.22)
MONOCYTES NFR BLD AUTO: 9 % (ref 4–12)
NEUTROPHILS # BLD AUTO: 4.38 THOUSANDS/ΜL (ref 1.85–7.62)
NEUTS SEG NFR BLD AUTO: 62 % (ref 43–75)
NITRITE UR QL STRIP: NEGATIVE
NONHDLC SERPL-MCNC: 140 MG/DL
NRBC BLD AUTO-RTO: 0 /100 WBCS
PH UR STRIP.AUTO: 6.5 [PH]
PLATELET # BLD AUTO: 150 THOUSANDS/UL (ref 149–390)
PMV BLD AUTO: 11.8 FL (ref 8.9–12.7)
POTASSIUM SERPL-SCNC: 4.3 MMOL/L (ref 3.5–5.3)
PROT SERPL-MCNC: 7.7 G/DL (ref 6.4–8.2)
PROT UR STRIP-MCNC: NEGATIVE MG/DL
PSA SERPL-MCNC: 3.4 NG/ML (ref 0–4)
RBC # BLD AUTO: 5.88 MILLION/UL (ref 3.88–5.62)
SODIUM SERPL-SCNC: 140 MMOL/L (ref 136–145)
SP GR UR STRIP.AUTO: 1.02 (ref 1–1.03)
TRIGL SERPL-MCNC: 121 MG/DL
TSH SERPL DL<=0.05 MIU/L-ACNC: 2.54 UIU/ML (ref 0.36–3.74)
UROBILINOGEN UR QL STRIP.AUTO: 0.2 E.U./DL
WBC # BLD AUTO: 7.04 THOUSAND/UL (ref 4.31–10.16)

## 2019-08-10 PROCEDURE — G0103 PSA SCREENING: HCPCS

## 2019-08-10 PROCEDURE — 83036 HEMOGLOBIN GLYCOSYLATED A1C: CPT

## 2019-08-10 PROCEDURE — 36415 COLL VENOUS BLD VENIPUNCTURE: CPT

## 2019-08-10 PROCEDURE — 81003 URINALYSIS AUTO W/O SCOPE: CPT | Performed by: PHYSICIAN ASSISTANT

## 2019-08-10 PROCEDURE — 84443 ASSAY THYROID STIM HORMONE: CPT

## 2019-08-10 PROCEDURE — 85025 COMPLETE CBC W/AUTO DIFF WBC: CPT

## 2019-08-10 PROCEDURE — 80053 COMPREHEN METABOLIC PANEL: CPT

## 2019-08-10 PROCEDURE — 80061 LIPID PANEL: CPT

## 2019-08-13 ENCOUNTER — HOSPITAL ENCOUNTER (OUTPATIENT)
Dept: NON INVASIVE DIAGNOSTICS | Facility: HOSPITAL | Age: 69
Discharge: HOME | End: 2019-08-13
Payer: COMMERCIAL

## 2019-08-13 DIAGNOSIS — R00.2 PALPITATIONS: ICD-10-CM

## 2019-08-13 DIAGNOSIS — I10 BENIGN ESSENTIAL HYPERTENSION: ICD-10-CM

## 2019-08-13 PROCEDURE — 93225 XTRNL ECG REC<48 HRS REC: CPT

## 2019-08-13 PROCEDURE — 93226 XTRNL ECG REC<48 HR SCAN A/R: CPT

## 2019-08-16 PROCEDURE — 93227 XTRNL ECG REC<48 HR R&I: CPT

## 2019-08-20 ENCOUNTER — OFFICE VISIT (OUTPATIENT)
Dept: OBGYN CLINIC | Facility: CLINIC | Age: 69
End: 2019-08-20

## 2019-08-20 VITALS
BODY MASS INDEX: 29.2 KG/M2 | HEIGHT: 71 IN | WEIGHT: 208.6 LBS | SYSTOLIC BLOOD PRESSURE: 147 MMHG | DIASTOLIC BLOOD PRESSURE: 97 MMHG | HEART RATE: 78 BPM

## 2019-08-20 DIAGNOSIS — Z98.890 STATUS POST ARTHROSCOPY OF KNEE: Primary | ICD-10-CM

## 2019-08-20 DIAGNOSIS — M17.11 PRIMARY OSTEOARTHRITIS OF RIGHT KNEE: ICD-10-CM

## 2019-08-20 PROCEDURE — 99024 POSTOP FOLLOW-UP VISIT: CPT | Performed by: PHYSICIAN ASSISTANT

## 2019-08-20 NOTE — PROGRESS NOTES
CHIEF COMPLAINT:   Chief Complaint   Patient presents with    Right Knee - Post-op         PROCEDURE: S/P right knee arthroscopy with partial medial and lateral meniscectomy, removal loose bodies with injection of cortisone May 23, 2019    SUBJECTIVE:  Patient had knee aspirated and cortisone injected last visit with some temporary improvement  Still has pain mainly on the medial aspect of the knee  Pain with stairs and getting up from a seated position  During the knee arthroscopy it was noted the patient has grade 3 and 4 changes along the medial femoral condyle and patient is aware of this  He has not tried physical therapy or hyaluronic acid injections as of yet  He denies any numbness or tingling lower extremity  All of his pain mainly is on the medial aspect of his knee  ROS:  Review of systems form reviewed August 20, 2019   General: no fever, no chills  Respiratory:  No coughing, shortness of breath or wheezing  Cardiovascular:  No chest pain, no palpitations  Neurological:  No headaches, no confusion  Review of all other systems is negative    MEDS:   Current Outpatient Medications   Medication Sig Dispense Refill    Cholecalciferol (VITAMIN D3) 5000 units CAPS Vitamin D3      losartan (COZAAR) 50 mg tablet Take 1 tablet (50 mg total) by mouth daily 7 tablet 0    Magnesium 100 MG CAPS magnesium      meloxicam (MOBIC) 15 mg tablet Take 1 tablet (15 mg total) by mouth daily 30 tablet 0     No current facility-administered medications for this visit  ALLERGIES: Patient has no known allergies        Vitals:    08/20/19 1424   BP: 147/97   Pulse: 78   Weight: 94 6 kg (208 lb 9 6 oz)   Height: 5' 11" (1 803 m)       PHYSICAL EXAM:    General Appearance:  Alert, cooperative, no distress, appears stated age; antalgic gait right lower extremity   Lungs:   respirations unlabored   Chest Wall:  No tenderness or deformity   Heart:  Normal Heart rate noted   Extremities: Extremities normal, atraumatic, no cyanosis or edema   Pulses: 2+ and symmetric   Neurologic: Normal     ORTHO EXAM:  Examination right knee with well-healed portal sites  No erythema, no drainage no signs of infection  Active range of motion 0-120 degrees with mild effusion  Patient with tenderness medial femoral condyle  Sensation is intact light touch    ASSESSMENT/PLAN:   S/p 3 months above procedure  Kelsy was seen today for post-op  Diagnoses and all orders for this visit:    Status post arthroscopy of knee  -     Ambulatory referral to Physical Therapy; Future  -     Injection procedure prior authorization; Future    Primary osteoarthritis of right knee        There are no Patient Instructions on file for this visit  DISCUSSION SUMMARY:  Patient is S/P 3 months right knee arthroscopy  Patient was doing well until he had an accident when his dog jumped onto his knee  Patient was noted to have grade 3 and grade 4 changes medial femoral condyle  Most likely the arthritis was aggravated by the injury  Patient was given a prescription to start formal physical therapy right knee  Will also order Synvisc-One injections to try  Patient will be contacted after the injections have been authorized shipped to the office

## 2019-08-22 ENCOUNTER — OFFICE VISIT (OUTPATIENT)
Dept: INTERNAL MEDICINE CLINIC | Facility: CLINIC | Age: 69
End: 2019-08-22
Payer: COMMERCIAL

## 2019-08-22 VITALS
BODY MASS INDEX: 29.4 KG/M2 | DIASTOLIC BLOOD PRESSURE: 88 MMHG | HEART RATE: 106 BPM | SYSTOLIC BLOOD PRESSURE: 126 MMHG | TEMPERATURE: 98.3 F | HEIGHT: 71 IN | WEIGHT: 210 LBS | OXYGEN SATURATION: 94 %

## 2019-08-22 DIAGNOSIS — G47.33 OBSTRUCTIVE SLEEP APNEA: Chronic | ICD-10-CM

## 2019-08-22 DIAGNOSIS — Z12.11 COLON CANCER SCREENING: Primary | ICD-10-CM

## 2019-08-22 DIAGNOSIS — S83.241A OTHER TEAR OF MEDIAL MENISCUS, CURRENT INJURY, RIGHT KNEE, INITIAL ENCOUNTER: ICD-10-CM

## 2019-08-22 DIAGNOSIS — R00.2 PALPITATIONS: ICD-10-CM

## 2019-08-22 DIAGNOSIS — I10 BENIGN ESSENTIAL HYPERTENSION: Chronic | ICD-10-CM

## 2019-08-22 PROCEDURE — 1036F TOBACCO NON-USER: CPT | Performed by: PHYSICIAN ASSISTANT

## 2019-08-22 PROCEDURE — 3074F SYST BP LT 130 MM HG: CPT | Performed by: PHYSICIAN ASSISTANT

## 2019-08-22 PROCEDURE — 3008F BODY MASS INDEX DOCD: CPT | Performed by: PHYSICIAN ASSISTANT

## 2019-08-22 PROCEDURE — 3725F SCREEN DEPRESSION PERFORMED: CPT | Performed by: PHYSICIAN ASSISTANT

## 2019-08-22 PROCEDURE — 99214 OFFICE O/P EST MOD 30 MIN: CPT | Performed by: PHYSICIAN ASSISTANT

## 2019-08-22 PROCEDURE — 1160F RVW MEDS BY RX/DR IN RCRD: CPT | Performed by: PHYSICIAN ASSISTANT

## 2019-08-22 NOTE — PROGRESS NOTES
Assessment/Plan: doing well minimal palpitations  Blood pressure is acceptable  Holter unremarkable  Still a lot of ectopy on physical exam will get stress test and an echo I reviewed all of his labs with him       Diagnoses and all orders for this visit:    Colon cancer screening  -     Ambulatory referral to Gastroenterology; Future    Obstructive sleep apnea  -     Stress test only, exercise; Future  -     Echo complete with contrast if indicated; Future    Benign essential hypertension  -     Stress test only, exercise; Future  -     Echo complete with contrast if indicated; Future    Other tear of medial meniscus, current injury, right knee, initial encounter    Palpitations  -     Stress test only, exercise; Future  -     Echo complete with contrast if indicated; Future        No problem-specific Assessment & Plan notes found for this encounter  Subjective:      Patient ID: Estrella Giron is a 71 y o  male  Back for follow-up  Fluttery feeling in the chest much less frequent no shortness of breath chest pain dizziness or sweating  Normally active and well currently having some knee problems following with Orthopedics      The following portions of the patient's history were reviewed and updated as appropriate:   He has a past medical history of Arthritis of carpometacarpal (CMC) joint of thumb, Bundle branch block, right, Dupuytren's contracture, Elevated PSA, Hypertension, JAY (obstructive sleep apnea), Polycythemia, and Thrombocytopenia ([facility] [address] )  ,  does not have any pertinent problems on file  ,   has a past surgical history that includes Appendectomy; Prostate biopsy; Eye surgery; Knee surgery (Left); and pr knee scope,med+lat menis repair (Right, 5/23/2019)  ,  family history includes Cancer in his father; No Known Problems in his mother  ,   reports that he has never smoked  He has never used smokeless tobacco  He reports that he drinks alcohol  He reports that he does not use drugs  ,  has No Known Allergies     Current Outpatient Medications   Medication Sig Dispense Refill    Cholecalciferol (VITAMIN D3) 5000 units CAPS Vitamin D3      losartan (COZAAR) 50 mg tablet Take 1 tablet (50 mg total) by mouth daily 7 tablet 0    Magnesium 100 MG CAPS magnesium      meloxicam (MOBIC) 15 mg tablet Take 1 tablet (15 mg total) by mouth daily 30 tablet 0     No current facility-administered medications for this visit  Review of Systems   Constitutional: Negative for activity change, appetite change, chills, diaphoresis, fatigue, fever and unexpected weight change  HENT: Negative for congestion, dental problem, drooling, ear discharge, ear pain, facial swelling, hearing loss, nosebleeds, postnasal drip, rhinorrhea, sinus pressure, sinus pain, sneezing, sore throat, tinnitus, trouble swallowing and voice change  Eyes: Negative for photophobia, pain, discharge, redness, itching and visual disturbance  Respiratory: Negative for apnea, cough, choking, chest tightness, shortness of breath, wheezing and stridor  Cardiovascular: Negative for chest pain, palpitations and leg swelling  Gastrointestinal: Negative for abdominal distention, abdominal pain, anal bleeding, blood in stool, constipation, diarrhea, nausea, rectal pain and vomiting  Endocrine: Negative for cold intolerance, heat intolerance, polydipsia, polyphagia and polyuria  Genitourinary: Negative for decreased urine volume, difficulty urinating, dysuria, enuresis, flank pain, frequency, genital sores, hematuria and urgency  Musculoskeletal: Positive for arthralgias  Negative for back pain, gait problem, joint swelling, myalgias, neck pain and neck stiffness  Skin: Negative for color change, pallor, rash and wound  Neurological: Negative for dizziness, tremors, seizures, syncope, facial asymmetry, speech difficulty, weakness, light-headedness, numbness and headaches  Hematological: Negative for adenopathy   Does not bruise/bleed easily  Psychiatric/Behavioral: Negative for agitation, behavioral problems, confusion, decreased concentration, dysphoric mood, hallucinations, self-injury, sleep disturbance and suicidal ideas  The patient is not nervous/anxious and is not hyperactive  Objective:  Vitals:    08/22/19 1339   BP: 126/88   BP Location: Left arm   Patient Position: Sitting   Cuff Size: Standard   Pulse: (!) 106   Temp: 98 3 °F (36 8 °C)   TempSrc: Oral   SpO2: 94%   Weight: 95 3 kg (210 lb)   Height: 5' 11" (1 803 m)     Body mass index is 29 29 kg/m²  Physical Exam   Constitutional: He is oriented to person, place, and time  He appears well-developed  HENT:   Head: Normocephalic  Right Ear: External ear normal    Left Ear: External ear normal    Mouth/Throat: Oropharynx is clear and moist    Eyes: Pupils are equal, round, and reactive to light  Conjunctivae are normal    Neck: Neck supple  No JVD present  No thyromegaly present  Cardiovascular: Normal rate, regular rhythm, normal heart sounds and intact distal pulses  Exam reveals no gallop and no friction rub  No murmur heard  Pulmonary/Chest: Effort normal and breath sounds normal  No stridor  No respiratory distress  He has no wheezes  He has no rales  He exhibits no tenderness  Abdominal: Soft  Bowel sounds are normal    Musculoskeletal: Normal range of motion  He exhibits tenderness ( right knee)  He exhibits no edema  Lymphadenopathy:     He has no cervical adenopathy  Neurological: He is alert and oriented to person, place, and time  He has normal reflexes  He displays normal reflexes  No cranial nerve deficit or sensory deficit  He exhibits normal muscle tone  Coordination normal    Skin: Skin is warm and dry

## 2019-09-05 ENCOUNTER — CONSULT (OUTPATIENT)
Dept: UROLOGY | Facility: CLINIC | Age: 69
End: 2019-09-05
Payer: COMMERCIAL

## 2019-09-05 ENCOUNTER — EVALUATION (OUTPATIENT)
Dept: PHYSICAL THERAPY | Facility: CLINIC | Age: 69
End: 2019-09-05
Payer: COMMERCIAL

## 2019-09-05 VITALS
BODY MASS INDEX: 29.68 KG/M2 | HEIGHT: 71 IN | DIASTOLIC BLOOD PRESSURE: 92 MMHG | WEIGHT: 212 LBS | HEART RATE: 73 BPM | SYSTOLIC BLOOD PRESSURE: 140 MMHG

## 2019-09-05 DIAGNOSIS — I10 BENIGN ESSENTIAL HYPERTENSION: Chronic | ICD-10-CM

## 2019-09-05 DIAGNOSIS — G89.29 CHRONIC PAIN OF RIGHT KNEE: Primary | ICD-10-CM

## 2019-09-05 DIAGNOSIS — M25.561 CHRONIC PAIN OF RIGHT KNEE: Primary | ICD-10-CM

## 2019-09-05 DIAGNOSIS — R00.2 PALPITATIONS: ICD-10-CM

## 2019-09-05 DIAGNOSIS — R35.1 NOCTURIA: ICD-10-CM

## 2019-09-05 DIAGNOSIS — Z98.890 STATUS POST ARTHROSCOPY OF KNEE: ICD-10-CM

## 2019-09-05 DIAGNOSIS — R97.20 RISING PSA LEVEL: Primary | ICD-10-CM

## 2019-09-05 LAB — POST-VOID RESIDUAL VOLUME, ML POC: 55 ML

## 2019-09-05 PROCEDURE — 97110 THERAPEUTIC EXERCISES: CPT | Performed by: PHYSICAL THERAPIST

## 2019-09-05 PROCEDURE — 99203 OFFICE O/P NEW LOW 30 MIN: CPT | Performed by: PHYSICIAN ASSISTANT

## 2019-09-05 PROCEDURE — 97161 PT EVAL LOW COMPLEX 20 MIN: CPT | Performed by: PHYSICAL THERAPIST

## 2019-09-05 PROCEDURE — 51798 US URINE CAPACITY MEASURE: CPT | Performed by: PHYSICIAN ASSISTANT

## 2019-09-05 NOTE — PROGRESS NOTES
1  Rising PSA level  PSA Total, Diagnostic   2  Benign essential hypertension  Ambulatory referral to Urology   3  Palpitations  Ambulatory referral to Urology   4  Nocturia  Ambulatory referral to Urology    POCT Measure PVR         Assessment and plan:       1  Rising PSA  2  History of elevated PSA status post negative prostate biopsy  - unfortunately I do not have patient's previous records of his elevated PSA and prostate biopsy which was reportedly performed in [place] approximately 3 years ago  Patient will obtain medical records prior to follow-up  He will follow up in 6 months with a PSA prior to visit for continued surveillance of his PSA trend  2  Nocturia  - I reviewed with the patient that his nocturia may be secondary to his untreated obstructive sleep apnea  He has an upcoming appointment with pulmonologist in which I recommend further discussion  In the meantime we discussed decreasing fluids prior to bedtime  This will be re-evaluated at his follow-up appointment  Should he have persistent symptoms despite treatment of his sleep apnea, may start alpha blockade in the future  Patient verbalized understanding  Neo Hooper PA-C      Chief Complaint     Nocturia    History of Present Illness     Delores Lee is a 71 y o   male presenting today as a new patient for nocturia  Patient states that he has had irritative nocturia over the past few years  He states that this is sometimes 1-4 times nightly  He finds this disruptive as he does not feel like he obtains adequate sleep due to this  Denies any significant daytime issues  Feels like he relatively has a good stream, feels empty after urination  Denies any dysuria, gross hematuria, or urinary infections  Denies any previous treatment of lower urinary tract symptoms  Patient does report having a history of elevated PSA approximately 3 years ago    He states he underwent a prostate biopsy with a urologist in [facility], however is unable to recall where this was performed  I do not have records to review today unfortunately  Patient's most recent PSA is 3 4, however was previously 2 4 one year ago  Patient demonstrating adequate bladder emptying with a postvoid residual 55 mL  Laboratory     Lab Results   Component Value Date    CREATININE 0 96 08/10/2019       Lab Results   Component Value Date    PSA 3 4 08/10/2019    PSA 2 4 02/02/2018         Review of Systems     Review of Systems   Constitutional: Negative for activity change, appetite change, chills, diaphoresis, fatigue, fever and unexpected weight change  Respiratory: Negative for chest tightness and shortness of breath  Cardiovascular: Negative for chest pain, palpitations and leg swelling  Gastrointestinal: Negative for abdominal distention, abdominal pain, constipation, diarrhea, nausea and vomiting  Genitourinary: Negative for decreased urine volume, difficulty urinating, dysuria, enuresis, flank pain, frequency, genital sores, hematuria and urgency  Nocturia   Musculoskeletal: Negative for back pain, gait problem and myalgias  Skin: Negative for color change, pallor, rash and wound  Psychiatric/Behavioral: Negative for behavioral problems  The patient is not nervous/anxious  Allergies     No Known Allergies    Physical Exam     Physical Exam   Constitutional: He is oriented to person, place, and time  He appears well-developed and well-nourished  No distress  HENT:   Head: Normocephalic  Eyes: Conjunctivae are normal    Neck: Normal range of motion  No tracheal deviation present  Pulmonary/Chest: Effort normal    Musculoskeletal: Normal range of motion  He exhibits no edema or deformity  Neurological: He is alert and oriented to person, place, and time  Skin: Skin is warm and dry  No rash noted  He is not diaphoretic  No erythema  Psychiatric: He has a normal mood and affect   His behavior is normal  Vital Signs     Vitals:    09/05/19 1323   BP: 140/92   BP Location: Left arm   Patient Position: Sitting   Cuff Size: Standard   Pulse: 73   Weight: 96 2 kg (212 lb)   Height: 5' 11" (1 803 m)         Current Medications       Current Outpatient Medications:     Cholecalciferol (VITAMIN D3) 5000 units CAPS, Vitamin D3, Disp: , Rfl:     losartan (COZAAR) 50 mg tablet, Take 1 tablet (50 mg total) by mouth daily, Disp: 7 tablet, Rfl: 0    Magnesium 100 MG CAPS, magnesium, Disp: , Rfl:     meloxicam (MOBIC) 15 mg tablet, Take 1 tablet (15 mg total) by mouth daily, Disp: 30 tablet, Rfl: 0      Active Problems     Patient Active Problem List   Diagnosis    Benign essential hypertension    Vitamin D deficiency    Obstructive sleep apnea    Left bundle branch block    Right knee pain    Complex tear of lateral meniscus of right knee    Other tear of medial meniscus, current injury, right knee, initial encounter         Past Medical History     Past Medical History:   Diagnosis Date    Arthritis of carpometacarpal (CMC) joint of thumb     Bundle branch block, right     Dupuytren's contracture     Elevated PSA     Hypertension     JAY (obstructive sleep apnea)     Polycythemia     Thrombocytopenia (HCC)          Surgical History     Past Surgical History:   Procedure Laterality Date    APPENDECTOMY      EYE SURGERY      KNEE SURGERY Left     arthroscopy    MS KNEE SCOPE,MED+LAT MENIS REPAIR Right 5/23/2019    Procedure: KNEE ARTHROSCOPIC MEDIAL AND LATERAL MENISCAL REPAIR; REMOVAL LOOSE BODY; CORTISONE INJECTION;  Surgeon: Estefania Carnes MD;  Location: [facility];  Service: Orthopedics    PROSTATE BIOPSY      prostate punch         Family History     Family History   Problem Relation Age of Onset    Cancer Father     No Known Problems Mother          Social History     Social History       Radiology

## 2019-09-05 NOTE — LETTER
2019    Cindy Shoemaker PA-C  [address] [facility] [address]  [facility] [address]    Patient: Reilly Mauricio   YOB: 1950   Date of Visit: 2019     Encounter Diagnosis     ICD-10-CM    1  Chronic pain of right knee M25 561     G89 29    2  Status post arthroscopy of knee Z98 890 Ambulatory referral to Physical Therapy       Dear Dr José Manuel King: Thank you for your recent referral of Reilly Mauricio  Please review the attached evaluation summary from [address] recent visit  Please verify that you agree with the plan of care by signing the attached order  If you have any questions or concerns, please do not hesitate to call  I sincerely appreciate the opportunity to share in the care of one of your patients and hope to have another opportunity to work with you in the near future  Sincerely,    Renuka Bagley, PT      Referring Provider:      I certify that I have read the below Plan of Care and certify the need for these services furnished under this plan of treatment while under my care  Cindy Shoemaker PA-C  [address] [facility] [address]  [facility] [address]  [place]          PT Evaluation     Today's date: 2019  Patient name: Reilly Mauriico  : 1950  MRN: [id number]  Referring provider: Lachelle Gore PA-C  Dx:   Encounter Diagnosis     ICD-10-CM    1  Chronic pain of right knee M25 561     G89 29    2  Status post arthroscopy of knee Z98 890 Ambulatory referral to Physical Therapy                  Assessment  Assessment details: Patient is a 70 yo male had knee arthroscopic for debridement 2019   2 weeks ago dog jumped on knee and felt a pop  Now pain 8/10 with weightbearing  R Knee ROM 5-113  Difficulty getting out of cars, step to on stairs and difficulty squatting for lifting  Patient can benefit from skilled PT to increase ROM, increase MMT strength and decrease pain     Understanding of Dx/Px/POC: good Prognosis: good    Goals  3 weeks   able to get out of the car normally  Increase knee MMT strength to normal  6 weeks  Functional ROM 0 - 120 for extended standing and transfers  Decrease knee pain by 25%    Plan  Planned modality interventions: TENS and ultrasound  Planned therapy interventions: joint mobilization, manual therapy, therapeutic exercise, home exercise program and therapeutic training  Frequency: 2x week  Duration in weeks: 6        Subjective Evaluation    History of Present Illness  Mechanism of injury: Arthroscopic surgery May 23, 2019  Pain  Current pain ratin  At best pain ratin  At worst pain ratin  Quality: knife-like    Social Support  Steps to enter house: yes  Stairs in house: yes     Patient Goals  Patient goals for therapy: decreased pain          Objective     Active Range of Motion   Left Knee   Flexion: 130 degrees   Extension: 0 degrees     Right Knee   Flexion: 113 degrees   Extension: 5 degrees     Mobility   Patellar Mobility:     Right Knee   Hypomobile: medial     Strength/Myotome Testing     Right Knee   Flexion: 4+  Extension: 4+             Precautions: Having gel shots soon      Manual              PROM                                                                     Exercise Diary              HEP strengthe 30            bike             Elliptical bcwds             CS/HR             Circuit legs             TG squats             BS leg press             BS toe raises             TM gt   Tr                                                                                                                                                                 Modalities              US             Heat/stim

## 2019-09-05 NOTE — PROGRESS NOTES
PT Evaluation     Today's date: 2019  Patient name: Rl Wang  : 1950  MRN: [id number]  Referring provider: Emeterio Mccabe PA-C  Dx:   Encounter Diagnosis     ICD-10-CM    1  Chronic pain of right knee M25 561     G89 29    2  Status post arthroscopy of knee Z98 890 Ambulatory referral to Physical Therapy                  Assessment  Assessment details: Patient is a 70 yo male had knee arthroscopic for debridement 2019   2 weeks ago dog jumped on knee and felt a pop  Now pain 8/10 with weightbearing  R Knee ROM 5-113  Difficulty getting out of cars, step to on stairs and difficulty squatting for lifting  Patient can benefit from skilled PT to increase ROM, increase MMT strength and decrease pain     Understanding of Dx/Px/POC: good   Prognosis: good    Goals  3 weeks   able to get out of the car normally  Increase knee MMT strength to normal  6 weeks  Functional ROM 0 - 120 for extended standing and transfers  Decrease knee pain by 25%    Plan  Planned modality interventions: TENS and ultrasound  Planned therapy interventions: joint mobilization, manual therapy, therapeutic exercise, home exercise program and therapeutic training  Frequency: 2x week  Duration in weeks: 6        Subjective Evaluation    History of Present Illness  Mechanism of injury: Arthroscopic surgery May 23, 2019  Pain  Current pain ratin  At best pain ratin  At worst pain ratin  Quality: knife-like    Social Support  Steps to enter house: yes  Stairs in house: yes     Patient Goals  Patient goals for therapy: decreased pain          Objective     Active Range of Motion   Left Knee   Flexion: 130 degrees   Extension: 0 degrees     Right Knee   Flexion: 113 degrees   Extension: 5 degrees     Mobility   Patellar Mobility:     Right Knee   Hypomobile: medial     Strength/Myotome Testing     Right Knee   Flexion: 4+  Extension: 4+             Precautions: Having gel shots soon      Manual              PROM Exercise Diary  9/5            HEP strengthe 30            bike             Elliptical bcwds             CS/HR             Circuit legs             TG squats             BS leg press             BS toe raises             TM gt   Tr                                                                                                                                                                 Modalities              US             Heat/stim

## 2019-09-06 NOTE — PROGRESS NOTES
I have reviewed the notes, assessments, and/or procedures performed by Mimi Patel PT, I concur with her/his documentation of Cori Boothe

## 2019-09-10 ENCOUNTER — OFFICE VISIT (OUTPATIENT)
Dept: PHYSICAL THERAPY | Facility: CLINIC | Age: 69
End: 2019-09-10
Payer: COMMERCIAL

## 2019-09-10 DIAGNOSIS — G89.29 CHRONIC PAIN OF RIGHT KNEE: ICD-10-CM

## 2019-09-10 DIAGNOSIS — M25.561 CHRONIC PAIN OF RIGHT KNEE: ICD-10-CM

## 2019-09-10 DIAGNOSIS — Z98.890 STATUS POST ARTHROSCOPY OF KNEE: Primary | ICD-10-CM

## 2019-09-10 PROCEDURE — 97035 APP MDLTY 1+ULTRASOUND EA 15: CPT

## 2019-09-10 PROCEDURE — 97110 THERAPEUTIC EXERCISES: CPT

## 2019-09-10 NOTE — PROGRESS NOTES
Daily Note     Today's date: 9/10/2019  Patient name: Tomas Benites  : 1950  MRN: [id number]  Referring provider: Belkis Hui PA-C  Dx:   Encounter Diagnosis     ICD-10-CM    1  Status post arthroscopy of knee Z98 890    2  Chronic pain of right knee M25 561     G89 29        Start Time: 1800  Stop Time: 1845  Total time in clinic (min): 45 minutes    Subjective: Patient reports R knee is feeling better  Minimal pain in knee today  Objective: See treatment diary below      Assessment: Patient tolerated exercises well without increased pain  US to knee post exercises to enhance healing and decrease pain  Plan: Continue per plan of care  Precautions: Having gel shots soon      Manual              PROM                                                                     Exercise Diary  9/5 9/10           HEP strengthe 30            bike  10'           Elliptical bcwds  --           CS/HR  10x 10 sec hold           Circuit legs  2x10           TG squats             BS leg press             BS toe raises             TM gt   Tr              Step ups   6" 2x10                                                                                                                                                 Modalities  9/10            US 10'            Heat/stim

## 2019-09-12 ENCOUNTER — OFFICE VISIT (OUTPATIENT)
Dept: PHYSICAL THERAPY | Facility: CLINIC | Age: 69
End: 2019-09-12
Payer: COMMERCIAL

## 2019-09-12 DIAGNOSIS — G89.29 CHRONIC PAIN OF RIGHT KNEE: Primary | ICD-10-CM

## 2019-09-12 DIAGNOSIS — M25.561 CHRONIC PAIN OF RIGHT KNEE: Primary | ICD-10-CM

## 2019-09-12 DIAGNOSIS — Z98.890 STATUS POST ARTHROSCOPY OF KNEE: ICD-10-CM

## 2019-09-12 PROCEDURE — 97110 THERAPEUTIC EXERCISES: CPT

## 2019-09-12 PROCEDURE — 97140 MANUAL THERAPY 1/> REGIONS: CPT

## 2019-09-12 NOTE — PROGRESS NOTES
Daily Note     Today's date: 2019  Patient name: Deya Cartwright  : 1950  MRN: [id number]  Referring provider: Shae Dorsey PA-C  Dx:   Encounter Diagnosis     ICD-10-CM    1  Chronic pain of right knee M25 561     G89 29    2  Status post arthroscopy of knee Z98 890                   Subjective: Patient reports "R knee is feeling a little sore but not bad " Reports "no pain or soreness after last visit "       Objective: See treatment diary below      Assessment: Patient tolerated exercises well without increased pain  Pt continues to use rails when completing steps ups  Added lateral steps ups with no increase in R knee pain  PROM achieved full knee extension and 120 degrees of flexion  Pulsed US to knee post exercises to enhance healing and decrease pain  Treated by Arsh Ruiz, SPT, supervised Srini Luong, PT, MPT  Plan: Continue per plan of care  Precautions: Having gel shots soon      Manual              PROM 10'                                                                    Exercise Diary  9/5 9/10 9/12          HEP strengthe 30            bike  10' 10'          Elliptical bcwds  -- ---          CS/HR  10x 10 sec hold 10x10" hold          Circuit legs  2x10 2x10          TG squats             BS leg press             BS toe raises             TM gt   Tr              Step ups   6" 2x10 6" 2x10          Lateral Step-Ups   6"  20x                                                                                                                                   Modalities  9/10 9/12           US 10' 10'           Heat/stim

## 2019-09-16 ENCOUNTER — OFFICE VISIT (OUTPATIENT)
Dept: PHYSICAL THERAPY | Facility: CLINIC | Age: 69
End: 2019-09-16
Payer: COMMERCIAL

## 2019-09-16 DIAGNOSIS — G89.29 CHRONIC PAIN OF RIGHT KNEE: Primary | ICD-10-CM

## 2019-09-16 DIAGNOSIS — M25.561 CHRONIC PAIN OF RIGHT KNEE: Primary | ICD-10-CM

## 2019-09-16 DIAGNOSIS — Z98.890 STATUS POST ARTHROSCOPY OF KNEE: ICD-10-CM

## 2019-09-16 PROCEDURE — 97140 MANUAL THERAPY 1/> REGIONS: CPT | Performed by: PHYSICAL THERAPIST

## 2019-09-16 PROCEDURE — 97110 THERAPEUTIC EXERCISES: CPT | Performed by: PHYSICAL THERAPIST

## 2019-09-16 NOTE — PROGRESS NOTES
Daily Note     Today's date: 2019  Patient name: Lenny Mckinney  : 1950  MRN: [id number]  Referring provider: Loulou Villa PA-C  Dx:   Encounter Diagnosis     ICD-10-CM    1  Chronic pain of right knee M25 561     G89 29    2  Status post arthroscopy of knee Z98 890        Start Time: 1105          Subjective: Patient reports "stiffness/soreness" in the R knee  Also reports "not being able to get out of the car yesterday after a long weekend walking around in [place] "       Objective: See treatment diary below      Assessment: Patient tolerated exercises well without increased pain  Pt able to complete step ups with minimal use of rails  Added TG squats to increase strength and reinforce squatting motion required to lift from the floor  BS leg press and toes raises added with no increase in knee pain and to increase LE strength and position for squatting  PROM completed to increase knee ROM necessary for walking and standing   Pulsed US completed post-session to enhance healing  Pt continues to need skilled PT to imporve gait and LE strength required to squat to play with dogs and lift at home  Treated by Ros Panchal, SPT, supervised Lorie Mohs, PT, MPT  Plan: Continue per plan of care  No hands during step ups next session to increase strength in LE  Precautions: Having gel shots soon      Manual             PROM 10'                                                                    Exercise Diary  9/5 9/10 9/12 9/16         HEP strengthe 30   ---         bike  10' 10' 10'         Elliptical bcwds  -- --- ---         CS/HR  10x 10 sec hold 10x10" hold 10x10" hold         Circuit legs  2x10 2x10 20x         TG squats    20x lvl 8         BS leg press    20x 2pl         BS toe raises    20x 2 pl         TM gt   Tr     ---         Step ups   6" 2x10 6" 2x10 6" 20x         Lateral Step-Ups   6"  20x 6" 20x         TA lifting from floor Modalities  9/10 9/12 9/16          US 10' 10' 10'          Heat/stim

## 2019-09-19 ENCOUNTER — OFFICE VISIT (OUTPATIENT)
Dept: PHYSICAL THERAPY | Facility: CLINIC | Age: 69
End: 2019-09-19
Payer: COMMERCIAL

## 2019-09-19 DIAGNOSIS — Z98.890 STATUS POST ARTHROSCOPY OF KNEE: ICD-10-CM

## 2019-09-19 DIAGNOSIS — M25.561 CHRONIC PAIN OF RIGHT KNEE: Primary | ICD-10-CM

## 2019-09-19 DIAGNOSIS — G89.29 CHRONIC PAIN OF RIGHT KNEE: Primary | ICD-10-CM

## 2019-09-19 PROCEDURE — 97110 THERAPEUTIC EXERCISES: CPT

## 2019-09-19 PROCEDURE — 97116 GAIT TRAINING THERAPY: CPT

## 2019-09-19 NOTE — PROGRESS NOTES
Daily Note     Today's date: 2019  Patient name: Mian Orozco  : 1950  MRN: [id number]  Referring provider: Emiliano Toure PA-C  Dx:   Encounter Diagnosis     ICD-10-CM    1  Chronic pain of right knee M25 561     G89 29    2  Status post arthroscopy of knee Z98 890        Start Time: 1600          Subjective: Patient stated his knee is starting to feel better  Stated he is ascending/descending stairs easier  Stated pain is about 2/10  Objective: See treatment diary below      Assessment: Tolerated increased time on rec  Bike without difficulty  Cueing on heel/toe while ambulating on TM  Good endurance  No increased pain  Plan: Continue per plan of care  Precautions: Having gel shots soon      Manual             PROM 10'                                                                    Exercise Diary  9/5 9/10 9/12 9/16 9/19        HEP strengthe 30   ---         bike  10' 10' 10' 15'        Elliptical bcwds  -- --- ---         CS/HR  10x 10 sec hold 10x10" hold 10x10" hold 10x         Circuit legs  2x10 2x10 20x 20x        TG squats    20x lvl 8         BS leg press    20x 2pl 20x 2pl        BS toe raises    20x 2 pl 20x 2pl        TM gt   Tr     --- 10'        Step ups   6" 2x10 6" 2x10 6" 20x 8" 20x        Lateral Step-Ups   6"  20x 6" 20x 8" 20x        TA lifting from floor                                                                                                                         Modalities  9/10 9/12 9/16          US 10' 10' 10'          Heat/stim

## 2019-11-04 PROBLEM — I45.10 RIGHT BUNDLE BRANCH BLOCK (RBBB): Status: ACTIVE | Noted: 2017-11-20

## 2019-11-14 ENCOUNTER — HOSPITAL ENCOUNTER (OUTPATIENT)
Dept: NON INVASIVE DIAGNOSTICS | Facility: CLINIC | Age: 69
Discharge: HOME | End: 2019-11-14
Payer: COMMERCIAL

## 2019-11-14 DIAGNOSIS — G47.33 OBSTRUCTIVE SLEEP APNEA: Chronic | ICD-10-CM

## 2019-11-14 DIAGNOSIS — I10 BENIGN ESSENTIAL HYPERTENSION: Chronic | ICD-10-CM

## 2019-11-14 DIAGNOSIS — R00.2 PALPITATIONS: ICD-10-CM

## 2019-11-14 LAB
ARRHY DURING EX: NORMAL
CHEST PAIN STATEMENT: NORMAL
MAX DIASTOLIC BP: 88 MMHG
MAX HEART RATE: 131 BPM
MAX PREDICTED HEART RATE: 151 BPM
MAX. SYSTOLIC BP: 182 MMHG
PROTOCOL NAME: NORMAL
REASON FOR TERMINATION: NORMAL
TARGET HR FORMULA: NORMAL
TEST INDICATION: NORMAL
TIME IN EXERCISE PHASE: NORMAL

## 2019-11-14 PROCEDURE — 93016 CV STRESS TEST SUPVJ ONLY: CPT | Performed by: INTERNAL MEDICINE

## 2019-11-14 PROCEDURE — 93017 CV STRESS TEST TRACING ONLY: CPT

## 2019-11-14 PROCEDURE — 93018 CV STRESS TEST I&R ONLY: CPT | Performed by: INTERNAL MEDICINE

## 2019-11-14 PROCEDURE — 93306 TTE W/DOPPLER COMPLETE: CPT

## 2019-11-14 PROCEDURE — 93306 TTE W/DOPPLER COMPLETE: CPT | Performed by: INTERNAL MEDICINE

## 2020-02-05 DIAGNOSIS — I10 BENIGN ESSENTIAL HYPERTENSION: Chronic | ICD-10-CM

## 2020-02-05 RX ORDER — LOSARTAN POTASSIUM 50 MG/1
TABLET ORAL
Qty: 90 TABLET | Refills: 1 | Status: SHIPPED | OUTPATIENT
Start: 2020-02-05 | End: 2020-08-03

## 2020-03-11 ENCOUNTER — APPOINTMENT (OUTPATIENT)
Dept: LAB | Facility: CLINIC | Age: 70
End: 2020-03-11
Payer: COMMERCIAL

## 2020-03-11 DIAGNOSIS — R97.20 RISING PSA LEVEL: ICD-10-CM

## 2020-03-11 PROCEDURE — 84153 ASSAY OF PSA TOTAL: CPT

## 2020-03-11 PROCEDURE — 36415 COLL VENOUS BLD VENIPUNCTURE: CPT

## 2020-03-12 ENCOUNTER — OFFICE VISIT (OUTPATIENT)
Dept: UROLOGY | Facility: CLINIC | Age: 70
End: 2020-03-12
Payer: COMMERCIAL

## 2020-03-12 VITALS
WEIGHT: 214 LBS | HEIGHT: 71 IN | BODY MASS INDEX: 29.96 KG/M2 | DIASTOLIC BLOOD PRESSURE: 84 MMHG | HEART RATE: 68 BPM | SYSTOLIC BLOOD PRESSURE: 148 MMHG

## 2020-03-12 DIAGNOSIS — Z12.5 PROSTATE CANCER SCREENING: Primary | ICD-10-CM

## 2020-03-12 LAB — PSA SERPL-MCNC: 2 NG/ML (ref 0–4)

## 2020-03-12 PROCEDURE — 1160F RVW MEDS BY RX/DR IN RCRD: CPT | Performed by: PHYSICIAN ASSISTANT

## 2020-03-12 PROCEDURE — 3008F BODY MASS INDEX DOCD: CPT | Performed by: PHYSICIAN ASSISTANT

## 2020-03-12 PROCEDURE — 3077F SYST BP >= 140 MM HG: CPT | Performed by: PHYSICIAN ASSISTANT

## 2020-03-12 PROCEDURE — 3079F DIAST BP 80-89 MM HG: CPT | Performed by: PHYSICIAN ASSISTANT

## 2020-03-12 PROCEDURE — 99213 OFFICE O/P EST LOW 20 MIN: CPT | Performed by: PHYSICIAN ASSISTANT

## 2020-03-12 PROCEDURE — 4040F PNEUMOC VAC/ADMIN/RCVD: CPT | Performed by: PHYSICIAN ASSISTANT

## 2020-03-12 PROCEDURE — 1036F TOBACCO NON-USER: CPT | Performed by: PHYSICIAN ASSISTANT

## 2020-03-12 NOTE — PROGRESS NOTES
1  Prostate cancer screening  PSA, Total Screen         Assessment and plan:     1  Prostate cancer screening  2  History of elevated PSA status post negative prostate biopsy  - patient's PSA has improved back to 2 0  Normal prostate examination  Will follow up in 1 year with a repeat PSA prior to visit  Should this remain stable routine prostate cancer screening can be discontinued  2  Nocturia  - I reviewed with the patient that his nocturia may be secondary to his untreated obstructive sleep apnea  Patient verbalized understanding   - patient was offered a trial of alpha blockade however if he wishes to avoid any additional medications at this time  Kareen Ramires PA-C      Chief Complaint     Nocturia    History of Present Illness     Gayle Mcdonald is a 71 y o   male presenting for follow up of LUTS and prostate cancer screening    Patient states that he has had irritative nocturia over the past few years  He states that this is sometimes 1-4 times nightly  He finds this disruptive as he does not feel like he obtains adequate sleep due to this  Denies any significant daytime issues  Feels like he relatively has a good stream, feels empty after urination  Denies any dysuria, gross hematuria, or urinary infections  Denies any previous treatment of lower urinary tract symptoms  At his last appointment patient was demonstrating adequate bladder emptying PVR 55 mL  Patient does report having a history of elevated PSA approximately 3 years ago  He states he underwent a prostate biopsy with a urologist in [place], however is unable to recall where this was performed  I do not have records to review today unfortunately  Patient's most recent PSA is 2 0 (03/11/2020), previously 3 4 (08/10/2019), and 2 4 (02/02/2018)        AUA SYMPTOM SCORE      Most Recent Value   AUA SYMPTOM SCORE   How often have you had a sensation of not emptying your bladder completely after you finished urinating? 1   How often have you had to urinate again less than two hours after you finished urinating? 2   How often have you found you stopped and started again several times when you urinate? 1   How often have you found it difficult to postpone urination? 3   How often have you had a weak urinary stream?  1   How often have you had to push or strain to begin urination? 2   How many times did you most typically get up to urinate from the time you went to bed at night until the time you got up in the morning? 3   Quality of Life: If you were to spend the rest of your life with your urinary condition just the way it is now, how would you feel about that?  3   AUA SYMPTOM SCORE  13          Laboratory     Lab Results   Component Value Date    CREATININE 0 96 08/10/2019       Lab Results   Component Value Date    PSA 2 0 03/11/2020    PSA 3 4 08/10/2019    PSA 2 4 02/02/2018         Review of Systems     Review of Systems   Constitutional: Negative for activity change, appetite change, chills, diaphoresis, fatigue, fever and unexpected weight change  Respiratory: Negative for chest tightness and shortness of breath  Cardiovascular: Negative for chest pain, palpitations and leg swelling  Gastrointestinal: Negative for abdominal distention, abdominal pain, constipation, diarrhea, nausea and vomiting  Genitourinary: Negative for decreased urine volume, difficulty urinating, dysuria, enuresis, flank pain, frequency, genital sores, hematuria and urgency  Nocturia   Musculoskeletal: Negative for back pain, gait problem and myalgias  Skin: Negative for color change, pallor, rash and wound  Psychiatric/Behavioral: Negative for behavioral problems  The patient is not nervous/anxious  Allergies     No Known Allergies    Physical Exam     Physical Exam   Constitutional: He is oriented to person, place, and time  He appears well-developed and well-nourished  No distress     HENT:   Head: Normocephalic  Eyes: Conjunctivae are normal    Neck: Normal range of motion  No tracheal deviation present  Pulmonary/Chest: Effort normal    Genitourinary:   Genitourinary Comments: Good rectal tone  Prostate 35g, smooth, symmetric, no palpable nodule   Musculoskeletal: Normal range of motion  He exhibits no edema or deformity  Neurological: He is alert and oriented to person, place, and time  Skin: Skin is warm and dry  No rash noted  He is not diaphoretic  No erythema  Psychiatric: He has a normal mood and affect   His behavior is normal          Vital Signs     Vitals:    03/12/20 1321   BP: 148/84   BP Location: Left arm   Patient Position: Sitting   Cuff Size: Standard   Pulse: 68   Weight: 97 1 kg (214 lb)   Height: 5' 11" (1 803 m)         Current Medications       Current Outpatient Medications:     Cholecalciferol (VITAMIN D3) 5000 units CAPS, Vitamin D3, Disp: , Rfl:     losartan (COZAAR) 50 mg tablet, TAKE 1 TABLET DAILY, Disp: 90 tablet, Rfl: 1    Magnesium 100 MG CAPS, magnesium, Disp: , Rfl:     meloxicam (MOBIC) 15 mg tablet, Take 1 tablet (15 mg total) by mouth daily, Disp: 30 tablet, Rfl: 0      Active Problems     Patient Active Problem List   Diagnosis    Benign essential hypertension    Vitamin D deficiency    Obstructive sleep apnea    Right bundle branch block (RBBB)    Right knee pain    Complex tear of lateral meniscus of right knee    Other tear of medial meniscus, current injury, right knee, initial encounter         Past Medical History     Past Medical History:   Diagnosis Date    Arthritis of carpometacarpal (CMC) joint of thumb     Bundle branch block, right     Dupuytren's contracture     Elevated PSA     Hypertension     JAY (obstructive sleep apnea)     Polycythemia     Thrombocytopenia (HCC)          Surgical History     Past Surgical History:   Procedure Laterality Date    APPENDECTOMY      EYE SURGERY      KNEE SURGERY Left     arthroscopy    [place] KNEE SCOPE,MED+LAT MENIS REPAIR Right 5/23/2019    Procedure: KNEE ARTHROSCOPIC MEDIAL AND LATERAL MENISCAL REPAIR; REMOVAL LOOSE BODY; CORTISONE INJECTION;  Surgeon: Gisselle Angeles MD;  Location: [facility];  Service: Orthopedics    PROSTATE BIOPSY      prostate punch         Family History     Family History   Problem Relation Age of Onset    Cancer Father     No Known Problems Mother          Social History     Social History       Radiology

## 2020-07-01 ENCOUNTER — TELEPHONE (OUTPATIENT)
Dept: INTERNAL MEDICINE CLINIC | Facility: CLINIC | Age: 70
End: 2020-07-01

## 2020-07-02 ENCOUNTER — OFFICE VISIT (OUTPATIENT)
Dept: INTERNAL MEDICINE CLINIC | Facility: CLINIC | Age: 70
End: 2020-07-02
Payer: COMMERCIAL

## 2020-07-02 VITALS
OXYGEN SATURATION: 98 % | TEMPERATURE: 98.9 F | DIASTOLIC BLOOD PRESSURE: 68 MMHG | WEIGHT: 216.6 LBS | SYSTOLIC BLOOD PRESSURE: 122 MMHG | BODY MASS INDEX: 30.21 KG/M2 | HEART RATE: 71 BPM

## 2020-07-02 DIAGNOSIS — K21.9 GASTROESOPHAGEAL REFLUX DISEASE WITHOUT ESOPHAGITIS: ICD-10-CM

## 2020-07-02 DIAGNOSIS — Z11.59 NEED FOR HEPATITIS C SCREENING TEST: ICD-10-CM

## 2020-07-02 DIAGNOSIS — Z00.00 MEDICARE ANNUAL WELLNESS VISIT, SUBSEQUENT: ICD-10-CM

## 2020-07-02 DIAGNOSIS — Z23 ENCOUNTER FOR IMMUNIZATION: ICD-10-CM

## 2020-07-02 DIAGNOSIS — E55.9 VITAMIN D DEFICIENCY: Chronic | ICD-10-CM

## 2020-07-02 DIAGNOSIS — Z13.6 SCREENING FOR CARDIOVASCULAR CONDITION: ICD-10-CM

## 2020-07-02 DIAGNOSIS — I10 BENIGN ESSENTIAL HYPERTENSION: Primary | Chronic | ICD-10-CM

## 2020-07-02 PROBLEM — S83.271A COMPLEX TEAR OF LATERAL MENISCUS OF RIGHT KNEE: Status: RESOLVED | Noted: 2019-05-15 | Resolved: 2020-07-02

## 2020-07-02 PROBLEM — S83.241A OTHER TEAR OF MEDIAL MENISCUS, CURRENT INJURY, RIGHT KNEE, INITIAL ENCOUNTER: Status: RESOLVED | Noted: 2019-05-15 | Resolved: 2020-07-02

## 2020-07-02 PROCEDURE — 4040F PNEUMOC VAC/ADMIN/RCVD: CPT | Performed by: INTERNAL MEDICINE

## 2020-07-02 PROCEDURE — 1101F PT FALLS ASSESS-DOCD LE1/YR: CPT | Performed by: INTERNAL MEDICINE

## 2020-07-02 PROCEDURE — 3288F FALL RISK ASSESSMENT DOCD: CPT | Performed by: INTERNAL MEDICINE

## 2020-07-02 PROCEDURE — 1170F FXNL STATUS ASSESSED: CPT | Performed by: INTERNAL MEDICINE

## 2020-07-02 PROCEDURE — 90732 PPSV23 VACC 2 YRS+ SUBQ/IM: CPT | Performed by: INTERNAL MEDICINE

## 2020-07-02 PROCEDURE — 99214 OFFICE O/P EST MOD 30 MIN: CPT | Performed by: INTERNAL MEDICINE

## 2020-07-02 PROCEDURE — 1036F TOBACCO NON-USER: CPT | Performed by: INTERNAL MEDICINE

## 2020-07-02 PROCEDURE — G0009 ADMIN PNEUMOCOCCAL VACCINE: HCPCS | Performed by: INTERNAL MEDICINE

## 2020-07-02 PROCEDURE — 3078F DIAST BP <80 MM HG: CPT | Performed by: INTERNAL MEDICINE

## 2020-07-02 PROCEDURE — 1160F RVW MEDS BY RX/DR IN RCRD: CPT | Performed by: INTERNAL MEDICINE

## 2020-07-02 PROCEDURE — G0439 PPPS, SUBSEQ VISIT: HCPCS | Performed by: INTERNAL MEDICINE

## 2020-07-02 PROCEDURE — 1125F AMNT PAIN NOTED PAIN PRSNT: CPT | Performed by: INTERNAL MEDICINE

## 2020-07-02 PROCEDURE — 3074F SYST BP LT 130 MM HG: CPT | Performed by: INTERNAL MEDICINE

## 2020-07-02 RX ORDER — PANTOPRAZOLE SODIUM 40 MG/1
40 TABLET, DELAYED RELEASE ORAL
Qty: 90 TABLET | Refills: 3 | Status: SHIPPED | OUTPATIENT
Start: 2020-07-02 | End: 2020-08-26

## 2020-07-02 NOTE — PROGRESS NOTES
Assessment and Plan:     1  Medicare annual wellness visit, subsequent    2  Screening for cardiovascular condition  - Lipid panel; Future    3  Need for hepatitis C screening test  - Hepatitis C antibody; Future    4  Encounter for immunization  - PNEUMOCOCCAL POLYSACCHARIDE VACCINE 23-VALENT =>3YO SQ IM     BMI Counseling: Body mass index is 30 21 kg/m²  The BMI is above normal  Nutrition recommendations include decreasing portion sizes, encouraging healthy choices of fruits and vegetables, limiting drinks that contain sugar, moderation in carbohydrate intake and increasing intake of lean protein  Exercise recommendations include exercising 3-5 times per week  Preventive health issues were discussed with patient, and age appropriate screening tests were ordered as noted in patient's After Visit Summary  Personalized health advice and appropriate referrals for health education or preventive services given if needed, as noted in patient's After Visit Summary       History of Present Illness:     Patient presents for Medicare Annual Wellness visit    Patient Care Team:  Kenny Pozo DO as PCP - General     Problem List:     Patient Active Problem List   Diagnosis    Benign essential hypertension    Vitamin D deficiency    Obstructive sleep apnea    Right bundle branch block (RBBB)    Right knee pain    Complex tear of lateral meniscus of right knee    Other tear of medial meniscus, current injury, right knee, initial encounter      Past Medical and Surgical History:     Past Medical History:   Diagnosis Date    Arthritis of carpometacarpal (CMC) joint of thumb     Bundle branch block, right     Dupuytren's contracture     Elevated PSA     Hypertension     JAY (obstructive sleep apnea)     Polycythemia     Thrombocytopenia ([address] )      Past Surgical History:   Procedure Laterality Date    APPENDECTOMY      EYE SURGERY      KNEE SURGERY Left     arthroscopy    ME KNEE SCOPE,MED+LAT MENIS REPAIR Right 5/23/2019    Procedure: KNEE ARTHROSCOPIC MEDIAL AND LATERAL MENISCAL REPAIR; REMOVAL LOOSE BODY; CORTISONE INJECTION;  Surgeon: Aleksandra Jett MD;  Location: [facility];  Service: Orthopedics    PROSTATE BIOPSY      prostate punch      Family History:     Family History   Problem Relation Age of Onset    Cancer Father     No Known Problems Mother       Social History:     E-Cigarette/Vaping    E-Cigarette Use Never User      E-Cigarette/Vaping Substances    Nicotine No     THC No     CBD No     Flavoring No     Other No     Unknown No      Social History     Socioeconomic History    Marital status: /Civil Union     Spouse name: None    Number of children: None    Years of education: None    Highest education level: None   Occupational History    None   Social Needs    Financial resource strain: None    Food insecurity:     Worry: None     Inability: None    Transportation needs:     Medical: None     Non-medical: None   Tobacco Use    Smoking status: Never Smoker    Smokeless tobacco: Never Used   Substance and Sexual Activity    Alcohol use: Yes     Frequency: Monthly or less     Drinks per session: 1 or 2     Binge frequency: Never     Comment: occasionally    Drug use: No    Sexual activity: Yes     Partners: Female   Lifestyle    Physical activity:     Days per week: 0 days     Minutes per session: 0 min    Stress: Not at all   Relationships    Social connections:     Talks on phone: None     Gets together: None     Attends Temple service: None     Active member of club or organization: None     Attends meetings of clubs or organizations: None     Relationship status: None    Intimate partner violence:     Fear of current or ex partner: None     Emotionally abused: None     Physically abused: None     Forced sexual activity: None   Other Topics Concern    None   Social History Narrative    Active advance directive      Medications and Allergies:     Current Outpatient Medications   Medication Sig Dispense Refill    Cholecalciferol (VITAMIN D3) 5000 units CAPS daily       losartan (COZAAR) 50 mg tablet TAKE 1 TABLET DAILY 90 tablet 1    Magnesium 100 MG CAPS daily       pantoprazole (PROTONIX) 40 mg tablet Take 1 tablet (40 mg total) by mouth daily before breakfast 90 tablet 3     No current facility-administered medications for this visit  No Known Allergies   Immunizations: There is no immunization history for the selected administration types on file for this patient  Health Maintenance:         Topic Date Due    Hepatitis C Screening  1950    CRC Screening: Colonoscopy  1950    CRC Screening: Cologuard  08/09/2021         Topic Date Due    DTaP,Tdap,and Td Vaccines (1 - Tdap) 07/07/1961    Pneumococcal Vaccine: 65+ Years (1 of 2 - PCV13) 07/07/2015    Influenza Vaccine  07/01/2020      Medicare Health Risk Assessment:     /68 (BP Location: Left arm, Patient Position: Sitting, Cuff Size: Standard)   Pulse 71   Temp 98 9 °F (37 2 °C) (Temporal) Comment: NO NSAIDS  Wt 98 2 kg (216 lb 9 6 oz) Comment: w/ shoes denied off  SpO2 98%   BMI 30 21 kg/m²      Gilberto Julian is here for his Subsequent Wellness visit  Health Risk Assessment:   Patient rates overall health as good  Patient feels that their physical health rating is same  Eyesight was rated as same  Hearing was rated as same  Patient feels that their emotional and mental health rating is same  Pain experienced in the last 7 days has been none  Patient states that he has experienced no weight loss or gain in last 6 months  Depression Screening:   PHQ-2 Score: 0      Fall Risk Screening: In the past year, patient has experienced: no history of falling in past year      Home Safety:  Patient does not have trouble with stairs inside or outside of their home  Patient has working smoke alarms and has no working carbon monoxide detector  Home safety hazards include: none  Nutrition:   Current diet is Regular  Medications:   Patient is currently taking over-the-counter supplements  OTC medications include: see medication list  Patient is able to manage medications  Activities of Daily Living (ADLs)/Instrumental Activities of Daily Living (IADLs):   Walk and transfer into and out of bed and chair?: Yes  Dress and groom yourself?: Yes    Bathe or shower yourself?: Yes    Feed yourself? Yes  Do your laundry/housekeeping?: No  Manage your money, pay your bills and track your expenses?: No  Make your own meals?: Yes    Do your own shopping?: Yes    Durable Medical Equipment Suppliers  None    Previous Hospitalizations:   Any hospitalizations or ED visits within the last 12 months?: No      Advance Care Planning:   Living will: Yes    Durable POA for healthcare: No    Advanced directive: Yes    Five wishes given: No      Cognitive Screening:   Provider or family/friend/caregiver concerned regarding cognition?: No    PREVENTIVE SCREENINGS      Cardiovascular Screening:    General: Screening Current      Diabetes Screening:     General: Screening Current      Colorectal Cancer Screening:     General: Screening Current      Prostate Cancer Screening:    General: Screening Current      Osteoporosis Screening:    General: Screening Not Indicated      Abdominal Aortic Aneurysm (AAA) Screening:    Risk factors include: age between 73-69 yo        General: Screening Not Indicated      Lung Cancer Screening:     General: Screening Not Indicated      Hepatitis C Screening:    General: Screening Not Indicated    Other Counseling Topics:   Car/seat belt/driving safety, skin self-exam, sunscreen and regular weightbearing exercise and calcium and vitamin D intake         Ray Vivar,

## 2020-07-02 NOTE — ASSESSMENT & PLAN NOTE
Discussed healthy eating options  Discussed exercise and weight loss  Start PPI therapy  Risks, side effects, benefits discussed

## 2020-07-02 NOTE — PATIENT INSTRUCTIONS
Gastroesophageal Reflux Disease   AMBULATORY CARE:   Gastroesophageal reflux  reflux occurs when acid and food in the stomach back up into the esophagus  Gastroesophageal reflux disease (GERD) is reflux that occurs more than twice a week for a few weeks  It usually causes heartburn and other symptoms  GERD can cause other health problems over time if it is not treated  Common symptoms include:  Heartburn is the most common symptom of GERD  You may feel burning pain in your chest or below the breast bone  This usually occurs after meals and spreads to your neck, jaw, or shoulder  The pain gets better when you change positions  You may also have any of the following:  · Bitter or acid taste in your mouth    · Dry cough    · Trouble swallowing or pain with swallowing    · Hoarseness or sore throat    · Frequent burping or hiccups    · Feeling of fullness soon after you start eating  Seek care immediately if:  · You feel full and cannot burp or vomit  · You have severe chest pain and sudden trouble breathing  · Your bowel movements are black, bloody, or tarry-looking  · Your vomit looks like coffee grounds or has blood in it  Contact your healthcare provider if:   · You vomit large amounts, or you vomit often  · You have trouble breathing after you vomit  · You have trouble swallowing, or pain with swallowing  · You are losing weight without trying  · Your symptoms get worse or do not improve with treatment  · You have questions or concerns about your condition or care  Treatment for GERD:  Your healthcare provider may prescribe medicine to decrease stomach acid  He may also prescribe medicine that help your esophagus and stomach move food and liquid to your intestines  Surgery may be done if other treatments do not work  You may need surgery to wrap the upper part of the stomach around the esophageal sphincter  This will strengthen the sphincter and prevent reflux     Manage GERD: · Do not have foods or drinks that may increase heartburn  These include chocolate, peppermint, fried or fatty foods, drinks that contain caffeine, or carbonated drinks (soda)  Other foods include spicy foods, onions, tomatoes, and tomato-based foods  Do not have foods or drinks that can irritate your esophagus, such as citrus fruits, juices, and alcohol  · Do not eat large meals  When you eat a lot of food at one time, your stomach needs more acid to digest it  Eat 6 small meals each day instead of 3 large ones, and eat slowly  Do not eat meals 2 to 3 hours before bedtime  · Elevate the head of your bed  Place 6-inch blocks under the head of your bed frame  You may also use more than one pillow under your head and shoulders while you sleep  · Maintain a healthy weight  If you are overweight, weight loss may help relieve symptoms of GERD  · Do not smoke  Smoking weakens the lower esophageal sphincter and increases the risk of GERD  Ask your healthcare provider for information if you currently smoke and need help to quit  E-cigarettes or smokeless tobacco still contain nicotine  Talk to your healthcare provider before you use these products  · Do not wear clothing that is tight around your waist   Tight clothing can put pressure on your stomach and cause or worsen GERD symptoms  Follow up with your healthcare provider as directed:  Write down your questions so you remember to ask them during your visits  © 2017 [address] Information is for End User's use only and may not be sold, redistributed or otherwise used for commercial purposes  All illustrations and images included in CareNotes® are the copyrighted property of [employer]  or [employer]  The above information is an  only  It is not intended as medical advice for individual conditions or treatments   Talk to your doctor, nurse or pharmacist before following any medical regimen to see if it is safe and effective for you  Medicare Preventive Visit Patient Instructions  Thank you for completing your Welcome to Medicare Visit or Medicare Annual Wellness Visit today  Your next wellness visit will be due in one year (7/2/2021)  The screening/preventive services that you may require over the next 5-10 years are detailed below  Some tests may not apply to you based off risk factors and/or age  Screening tests ordered at today's visit but not completed yet may show as past due  Also, please note that scanned in results may not display below  Preventive Screenings:  Service Recommendations Previous Testing/Comments   Colorectal Cancer Screening  · Colonoscopy    · Fecal Occult Blood Test (FOBT)/Fecal Immunochemical Test (FIT)  · Fecal DNA/Cologuard Test  · Flexible Sigmoidoscopy Age: 54-65 years old   Colonoscopy: every 10 years (May be performed more frequently if at higher risk)  OR  FOBT/FIT: every 1 year  OR  Cologuard: every 3 years  OR  Sigmoidoscopy: every 5 years  Screening may be recommended earlier than age 48 if at higher risk for colorectal cancer  Also, an individualized decision between you and your healthcare provider will decide whether screening between the ages of 74-80 would be appropriate   Colonoscopy: Not on file  FOBT/FIT: Not on file  Cologuard: 08/09/2018  Sigmoidoscopy: Not on file    Screening Current     Prostate Cancer Screening Individualized decision between patient and health care provider in men between ages of 53-78   Medicare will cover every 12 months beginning on the day after your 50th birthday PSA: 2 0 ng/mL     Screening Current     Hepatitis C Screening Once for adults born between [facility]  More frequently in patients at high risk for Hepatitis C Hep C Antibody: Not on file    Screening Not Indicated   Diabetes Screening 1-2 times per year if you're at risk for diabetes or have pre-diabetes Fasting glucose: 80 mg/dL   A1C: 5 6 %    Screening Current Cholesterol Screening Once every 5 years if you don't have a lipid disorder  May order more often based on risk factors  Lipid panel: 08/10/2019    Screening Current      Other Preventive Screenings Covered by Medicare:  1  Abdominal Aortic Aneurysm (AAA) Screening: covered once if your at risk  You're considered to be at risk if you have a family history of AAA or a male between the age of 73-68 who smoking at least 100 cigarettes in your lifetime  2  Lung Cancer Screening: covers low dose CT scan once per year if you meet all of the following conditions: (1) Age 50-69; (2) No signs or symptoms of lung cancer; (3) Current smoker or have quit smoking within the last 15 years; (4) You have a tobacco smoking history of at least 30 pack years (packs per day x number of years you smoked); (5) You get a written order from a healthcare provider  3  Glaucoma Screening: covered annually if you're considered high risk: (1) You have diabetes OR (2) Family history of glaucoma OR (3)  aged 48 and older OR (3)  American aged 72 and older  3  Osteoporosis Screening: covered every 2 years if you meet one of the following conditions: (1) Have a vertebral abnormality; (2) On glucocorticoid therapy for more than 3 months; (3) Have primary hyperparathyroidism; (4) On osteoporosis medications and need to assess response to drug therapy  5  HIV Screening: covered annually if you're between the age of 12-76  Also covered annually if you are younger than 13 and older than 72 with risk factors for HIV infection  For pregnant patients, it is covered up to 3 times per pregnancy      Immunizations:  Immunization Recommendations   Influenza Vaccine Annual influenza vaccination during flu season is recommended for all persons aged >= 6 months who do not have contraindications   Pneumococcal Vaccine (Prevnar and Pneumovax)  * Prevnar = PCV13  * Pneumovax = PPSV23 Adults 25-60 years old: 1-3 doses may be recommended based on certain risk factors  Adults 72 years old: Prevnar (PCV13) vaccine recommended followed by Pneumovax (PPSV23) vaccine  If already received PPSV23 since turning 65, then PCV13 recommended at least one year after PPSV23 dose  Hepatitis B Vaccine 3 dose series if at intermediate or high risk (ex: diabetes, end stage renal disease, liver disease)   Tetanus (Td) Vaccine - COST NOT COVERED BY MEDICARE PART B Following completion of primary series, a booster dose should be given every 10 years to maintain immunity against tetanus  Td may also be given as tetanus wound prophylaxis  Tdap Vaccine - COST NOT COVERED BY MEDICARE PART B Recommended at least once for all adults  For pregnant patients, recommended with each pregnancy  Shingles Vaccine (Shingrix) - COST NOT COVERED BY MEDICARE PART B  2 shot series recommended in those aged 48 and above     Health Maintenance Due:      Topic Date Due    Hepatitis C Screening  1950    CRC Screening: Colonoscopy  1950    CRC Screening: Cologuard  08/09/2021     Immunizations Due:      Topic Date Due    DTaP,Tdap,and Td Vaccines (1 - Tdap) 07/07/1961    Pneumococcal Vaccine: 65+ Years (1 of 2 - PCV13) 07/07/2015    Influenza Vaccine  07/01/2020     Advance Directives   What are advance directives? Advance directives are legal documents that state your wishes and plans for medical care  These plans are made ahead of time in case you lose your ability to make decisions for yourself  Advance directives can apply to any medical decision, such as the treatments you want, and if you want to donate organs  What are the types of advance directives? There are many types of advance directives, and each state has rules about how to use them  You may choose a combination of any of the following:  · Living will: This is a written record of the treatment you want   You can also choose which treatments you do not want, which to limit, and which to stop at a certain time  This includes surgery, medicine, IV fluid, and tube feedings  · Durable power of  for healthcare [facility]): This is a written record that states who you want to make healthcare choices for you when you are unable to make them for yourself  This person, called a proxy, is usually a family member or a friend  You may choose more than 1 proxy  · Do not resuscitate (DNR) order:  A DNR order is used in case your heart stops beating or you stop breathing  It is a request not to have certain forms of treatment, such as CPR  A DNR order may be included in other types of advance directives  · Medical directive: This covers the care that you want if you are in a coma, near death, or unable to make decisions for yourself  You can list the treatments you want for each condition  Treatment may include pain medicine, surgery, blood transfusions, dialysis, IV or tube feedings, and a ventilator (breathing machine)  · Values history: This document has questions about your views, beliefs, and how you feel and think about life  This information can help others choose the care that you would choose  Why are advance directives important? An advance directive helps you control your care  Although spoken wishes may be used, it is better to have your wishes written down  Spoken wishes can be misunderstood, or not followed  Treatments may be given even if you do not want them  An advance directive may make it easier for your family to make difficult choices about your care  Weight Management   Why it is important to manage your weight:  Being overweight increases your risk of health conditions such as heart disease, high blood pressure, type 2 diabetes, and certain types of cancer  It can also increase your risk for osteoarthritis, sleep apnea, and other respiratory problems  Aim for a slow, steady weight loss  Even a small amount of weight loss can lower your risk of health problems    How to lose weight safely:  A safe and healthy way to lose weight is to eat fewer calories and get regular exercise  You can lose up about 1 pound a week by decreasing the number of calories you eat by 500 calories each day  Healthy meal plan for weight management:  A healthy meal plan includes a variety of foods, contains fewer calories, and helps you stay healthy  A healthy meal plan includes the following:  · Eat whole-grain foods more often  A healthy meal plan should contain fiber  Fiber is the part of grains, fruits, and vegetables that is not broken down by your body  Whole-grain foods are healthy and provide extra fiber in your diet  Some examples of whole-grain foods are whole-wheat breads and pastas, oatmeal, brown rice, and bulgur  · Eat a variety of vegetables every day  Include dark, leafy greens such as spinach, kale, sofia greens, and mustard greens  Eat yellow and orange vegetables such as carrots, sweet potatoes, and winter squash  · Eat a variety of fruits every day  Choose fresh or canned fruit (canned in its own juice or light syrup) instead of juice  Fruit juice has very little or no fiber  · Eat low-fat dairy foods  Drink fat-free (skim) milk or 1% milk  Eat fat-free yogurt and low-fat cottage cheese  Try low-fat cheeses such as mozzarella and other reduced-fat cheeses  · Choose meat and other protein foods that are low in fat  Choose beans or other legumes such as split peas or lentils  Choose fish, skinless poultry (chicken or turkey), or lean cuts of red meat (beef or pork)  Before you cook meat or poultry, cut off any visible fat  · Use less fat and oil  Try baking foods instead of frying them  Add less fat, such as margarine, sour cream, regular salad dressing and mayonnaise to foods  Eat fewer high-fat foods  Some examples of high-fat foods include french fries, doughnuts, ice cream, and cakes  · Eat fewer sweets  Limit foods and drinks that are high in sugar   This includes candy, cookies, regular soda, and sweetened drinks  Exercise:  Exercise at least 30 minutes per day on most days of the week  Some examples of exercise include walking, biking, dancing, and swimming  You can also fit in more physical activity by taking the stairs instead of the elevator or parking farther away from stores  Ask your healthcare provider about the best exercise plan for you  © Copyright [employer] 2018 Information is for End User's use only and may not be sold, redistributed or otherwise used for commercial purposes   All illustrations and images included in CareNotes® are the copyrighted property of [employer]  or [address] [place] [address]

## 2020-07-02 NOTE — PROGRESS NOTES
INTERNAL MEDICINE FOLLOW-UP OFFICE VISIT  [facility] - [facility] DENIA SAUCEDO    NAME: Celia Guerra  AGE: 71 y o  SEX: male  : 1950     DATE: 2020     Assessment and Plan:     1  Benign essential hypertension  Assessment & Plan:  Blood pressure is stable in the office today  Continue losartan as prescribed  Orders:  -     Comprehensive metabolic panel; Future    2  Gastroesophageal reflux disease without esophagitis  Assessment & Plan:  Discussed healthy eating options  Discussed exercise and weight loss  Start PPI therapy  Risks, side effects, benefits discussed  Orders:  -     pantoprazole (PROTONIX) 40 mg tablet; Take 1 tablet (40 mg total) by mouth daily before breakfast    3  Vitamin D deficiency  Assessment & Plan:  Check up-to-date vitamin-D level  Would recommend he continue vitamin D3 5000 units daily  Orders:  -     Vitamin D 25 hydroxy; Future    Return in about 6 months (around 2021) for Follow-up  Chief Complaint:     Chief Complaint   Patient presents with    Follow-up        History of Present Illness:     Patient presents for routine follow-up  Has underlying history of obstructive sleep apnea (non-compliant with CPAP), hypertension, and vitamin-D deficiency  Because of COVID-19, he states he has been eating very poorly and has gained weight  Has a been very active around the house  He denies any chest pain, shortness of breath, palpitations  The only problem he has really been having is increased acid reflux  Seems to be happening with any food that he eats  Has been using a lot of Tums  Tums helps control his symptoms, but he has to take multiple Tums throughout the week  Review of Systems:     Review of Systems   Constitutional: Negative for activity change, appetite change and fatigue  Respiratory: Negative for apnea, cough, chest tightness, shortness of breath and wheezing      Cardiovascular: Negative for chest pain, palpitations and leg swelling  Gastrointestinal: Negative for abdominal distention, abdominal pain, blood in stool, constipation, diarrhea, nausea and vomiting  Musculoskeletal: Negative for arthralgias, back pain, gait problem, joint swelling and myalgias  Skin: Negative for rash  Neurological: Negative for dizziness, weakness, light-headedness, numbness and headaches  Psychiatric/Behavioral: Negative for behavioral problems, confusion, hallucinations, sleep disturbance and suicidal ideas  The patient is not nervous/anxious  Problem List:     Patient Active Problem List   Diagnosis    Benign essential hypertension    Vitamin D deficiency    Obstructive sleep apnea    Right bundle branch block (RBBB)    Right knee pain    Complex tear of lateral meniscus of right knee    Other tear of medial meniscus, current injury, right knee, initial encounter      Objective:     /68 (BP Location: Left arm, Patient Position: Sitting, Cuff Size: Standard)   Pulse 71   Temp 98 9 °F (37 2 °C) (Temporal) Comment: NO NSAIDS  Wt 98 2 kg (216 lb 9 6 oz) Comment: w/ shoes denied off  SpO2 98%   BMI 30 21 kg/m²     Physical Exam   Constitutional: He is oriented to person, place, and time  He appears well-developed and well-nourished  No distress  Eyes: Conjunctivae are normal  Right eye exhibits no discharge  Left eye exhibits no discharge  No scleral icterus  Neck: Neck supple  No JVD present  No thyromegaly present  Cardiovascular: Normal rate, regular rhythm and normal heart sounds  No murmur heard  Pulmonary/Chest: Effort normal and breath sounds normal  No respiratory distress  He has no wheezes  He has no rales  Abdominal: Soft  Bowel sounds are normal  He exhibits no distension  There is no tenderness  Musculoskeletal: He exhibits no edema  Lymphadenopathy:     He has no cervical adenopathy  Neurological: He is alert and oriented to person, place, and time     Skin: Skin is warm and dry  He is not diaphoretic  Psychiatric: He has a normal mood and affect  His behavior is normal    Vitals reviewed      Jason Roman DO  [facility]

## 2020-07-23 ENCOUNTER — APPOINTMENT (OUTPATIENT)
Dept: LAB | Facility: CLINIC | Age: 70
End: 2020-07-23
Payer: COMMERCIAL

## 2020-07-23 ENCOUNTER — TRANSCRIBE ORDERS (OUTPATIENT)
Dept: LAB | Facility: CLINIC | Age: 70
End: 2020-07-23

## 2020-07-23 DIAGNOSIS — Z13.6 SCREENING FOR CARDIOVASCULAR CONDITION: ICD-10-CM

## 2020-07-23 DIAGNOSIS — Z11.59 NEED FOR HEPATITIS C SCREENING TEST: ICD-10-CM

## 2020-07-23 DIAGNOSIS — E55.9 VITAMIN D DEFICIENCY: Chronic | ICD-10-CM

## 2020-07-23 DIAGNOSIS — I10 BENIGN ESSENTIAL HYPERTENSION: Chronic | ICD-10-CM

## 2020-07-23 LAB
25(OH)D3 SERPL-MCNC: 17.6 NG/ML (ref 30–100)
ALBUMIN SERPL BCP-MCNC: 3.8 G/DL (ref 3.5–5)
ALP SERPL-CCNC: 78 U/L (ref 46–116)
ALT SERPL W P-5'-P-CCNC: 20 U/L (ref 12–78)
ANION GAP SERPL CALCULATED.3IONS-SCNC: 4 MMOL/L (ref 4–13)
AST SERPL W P-5'-P-CCNC: 12 U/L (ref 5–45)
BILIRUB SERPL-MCNC: 0.97 MG/DL (ref 0.2–1)
BUN SERPL-MCNC: 13 MG/DL (ref 5–25)
CALCIUM SERPL-MCNC: 9.2 MG/DL (ref 8.3–10.1)
CHLORIDE SERPL-SCNC: 111 MMOL/L (ref 100–108)
CHOLEST SERPL-MCNC: 165 MG/DL (ref 50–200)
CO2 SERPL-SCNC: 27 MMOL/L (ref 21–32)
CREAT SERPL-MCNC: 1.03 MG/DL (ref 0.6–1.3)
GFR SERPL CREATININE-BSD FRML MDRD: 73 ML/MIN/1.73SQ M
GLUCOSE P FAST SERPL-MCNC: 86 MG/DL (ref 65–99)
HCV AB SER QL: NORMAL
HDLC SERPL-MCNC: 34 MG/DL
LDLC SERPL CALC-MCNC: 106 MG/DL (ref 0–100)
NONHDLC SERPL-MCNC: 131 MG/DL
POTASSIUM SERPL-SCNC: 4.5 MMOL/L (ref 3.5–5.3)
PROT SERPL-MCNC: 7.4 G/DL (ref 6.4–8.2)
SODIUM SERPL-SCNC: 142 MMOL/L (ref 136–145)
TRIGL SERPL-MCNC: 127 MG/DL

## 2020-07-23 PROCEDURE — 86803 HEPATITIS C AB TEST: CPT

## 2020-07-23 PROCEDURE — 80053 COMPREHEN METABOLIC PANEL: CPT

## 2020-07-23 PROCEDURE — 36415 COLL VENOUS BLD VENIPUNCTURE: CPT

## 2020-07-23 PROCEDURE — 80061 LIPID PANEL: CPT

## 2020-07-23 PROCEDURE — 82306 VITAMIN D 25 HYDROXY: CPT

## 2020-07-24 ENCOUNTER — TELEPHONE (OUTPATIENT)
Dept: INTERNAL MEDICINE CLINIC | Facility: CLINIC | Age: 70
End: 2020-07-24

## 2020-07-24 DIAGNOSIS — E55.9 VITAMIN D DEFICIENCY: Primary | ICD-10-CM

## 2020-07-24 RX ORDER — ERGOCALCIFEROL 1.25 MG/1
50000 CAPSULE ORAL WEEKLY
Qty: 12 CAPSULE | Refills: 0 | Status: SHIPPED | OUTPATIENT
Start: 2020-07-24

## 2020-07-24 NOTE — TELEPHONE ENCOUNTER
----- Message from Flower Echevarria DO sent at 7/24/2020  8:28 AM EDT -----  Call patient and let him know labs look good except vitamin D is low  I'm going to send in a 12 week course of high dose vitamin D to take  After he finishes that prescription, he should take 5000 units vitamin D3 daily

## 2020-08-03 DIAGNOSIS — I10 BENIGN ESSENTIAL HYPERTENSION: Chronic | ICD-10-CM

## 2020-08-03 RX ORDER — LOSARTAN POTASSIUM 50 MG/1
TABLET ORAL
Qty: 90 TABLET | Refills: 3 | Status: SHIPPED | OUTPATIENT
Start: 2020-08-03 | End: 2021-04-13 | Stop reason: SDUPTHER

## 2020-08-26 ENCOUNTER — APPOINTMENT (OUTPATIENT)
Dept: RADIOLOGY | Facility: CLINIC | Age: 70
End: 2020-08-26
Payer: COMMERCIAL

## 2020-08-26 ENCOUNTER — OFFICE VISIT (OUTPATIENT)
Dept: OBGYN CLINIC | Facility: CLINIC | Age: 70
End: 2020-08-26
Payer: COMMERCIAL

## 2020-08-26 VITALS
DIASTOLIC BLOOD PRESSURE: 87 MMHG | HEART RATE: 63 BPM | TEMPERATURE: 97.3 F | HEIGHT: 71 IN | BODY MASS INDEX: 30.24 KG/M2 | SYSTOLIC BLOOD PRESSURE: 149 MMHG | RESPIRATION RATE: 20 BRPM | WEIGHT: 216 LBS

## 2020-08-26 DIAGNOSIS — M25.562 LEFT KNEE PAIN, UNSPECIFIED CHRONICITY: Primary | ICD-10-CM

## 2020-08-26 DIAGNOSIS — M25.562 LEFT KNEE PAIN, UNSPECIFIED CHRONICITY: ICD-10-CM

## 2020-08-26 PROCEDURE — 99213 OFFICE O/P EST LOW 20 MIN: CPT | Performed by: ORTHOPAEDIC SURGERY

## 2020-08-26 PROCEDURE — 3008F BODY MASS INDEX DOCD: CPT | Performed by: INTERNAL MEDICINE

## 2020-08-26 PROCEDURE — 73562 X-RAY EXAM OF KNEE 3: CPT

## 2020-08-26 RX ORDER — MULTIVIT WITH MINERALS/LUTEIN
1000 TABLET ORAL DAILY
COMMUNITY
End: 2022-03-16 | Stop reason: ALTCHOICE

## 2020-08-26 NOTE — PROGRESS NOTES
SUBJECTIVE: Patient is a 80 yo male presenting with Left knee pain  Patient reports about a few weeks ago, lateral aspect of Left knee started to bother him  Admits to past surgical history of Left knee arthroscopy about 30 years ago in [place]  Patient is very active and skis every winter  He denies history of cortisone or visco injection of Left knee  Admits to giving out sensation occasionally, but denies locking or catching symptoms  Denies numbness or tingling of Left lower extremity  Patient offers no additional complaints or concerns at this time             ROS:   General: No fever, no chills, no weight loss, no weight gain  HEENT:  No loss of hearing, no nose bleeds, no sore throat  Eyes:  No eye pain, no red eyes, no visual disturbance  Respiratory:  No cough, no shortness of breath, no wheezing  Cardiovascular:  No chest pain, no palpitations, no edema  GI: No abdominal pain, no nausea, no vomiting  Endocrine: No frequent urination, no excessive thirst  Urinary:  No dysuria, no hematuria, no incontinence  Musculoskeletal: see HPI and PE  Skin:  No rash, no wounds  Neurological:  No dizziness, no headache, no numbness  Psychiatric:  No difficulty concentrating, no depression, no suicide thoughts, no anxiety  Review of all other systems is negative    PMH:  Past Medical History:   Diagnosis Date    Arthritis of carpometacarpal (CMC) joint of thumb     Bundle branch block, right     Complex tear of lateral meniscus of right knee 5/15/2019    Added automatically from request for surgery [id number]    Dupuytren's contracture     Elevated PSA     Hypertension     JAY (obstructive sleep apnea)     Polycythemia     Thrombocytopenia (HCC)        PSH:  Past Surgical History:   Procedure Laterality Date    APPENDECTOMY      EYE SURGERY      KNEE SURGERY Left     arthroscopy    FL KNEE SCOPE,MED+LAT MENIS REPAIR Right 5/23/2019    Procedure: KNEE ARTHROSCOPIC MEDIAL AND LATERAL MENISCAL REPAIR; REMOVAL LOOSE BODY; CORTISONE INJECTION;  Surgeon: Valentín Quach MD;  Location: [facility];  Service: Orthopedics    PROSTATE BIOPSY      prostate punch       Medications:  Current Outpatient Medications   Medication Sig Dispense Refill    Ascorbic Acid (vitamin C) 1000 MG tablet Take 1,000 mg by mouth daily      Cholecalciferol (VITAMIN D3) 5000 units CAPS daily       ergocalciferol (VITAMIN D2) 50,000 units Take 1 capsule (50,000 Units total) by mouth once a week 12 capsule 0    losartan (COZAAR) 50 mg tablet TAKE 1 TABLET DAILY 90 tablet 3     No current facility-administered medications for this visit  Allergies:  No Known Allergies    Family History:  Family History   Problem Relation Age of Onset    Cancer Father     No Known Problems Mother        Social History:  Social History     Occupational History    Not on file   Tobacco Use    Smoking status: Never Smoker    Smokeless tobacco: Never Used   Substance and Sexual Activity    Alcohol use: Yes     Frequency: Monthly or less     Drinks per session: 1 or 2     Binge frequency: Never     Comment: occasionally    Drug use: No    Sexual activity: Yes     Partners: Female       Physical Exam:  General :  Alert, cooperative, no distress, appears stated age  Blood pressure 149/87, pulse 63, temperature (!) 97 3 °F (36 3 °C), temperature source Temporal, resp  rate 20, height 5' 11" (1 803 m), weight 98 kg (216 lb)  Head:  Normocephalic, without obvious abnormality, atraumatic   Eyes:  Conjunctiva/corneas clear, EOM's intact,   Ears: Both ears normal appearance, no hearing deficits      Nose: Nares normal, septum midline, no drainage    Neck: Supple,  trachea midline, no adenopathy, no tenderness, no mass   Back:   Symmetric, no curvature, ROM normal, no tenderness   Lungs:   Respirations unlabored   Chest Wall:  No tenderness or deformity   Extremities: Extremities normal, atraumatic, no cyanosis or edema      Pulses: 2+ and symmetric   Skin: Skin color, texture, turgor normal, no rashes or lesions      Neurologic: Normal             Ortho Exam  Left knee  · Skin intact with no warmth, ecchymosis ,erythema   · Minimal tenderness to palpation lateral aspect of knee and superiolateral aspect of patella  · Palpable crepitus lateral aspect of knee  · Full AROM without pain  · Strength 5/5 resisted flexion/extension  · Sensation intact L2-S1  · Calf soft and nontender         Imaging Studies: The following imaging studies were reviewed in office today  My findings are noted  Xrays taken today 08/26/2020 of left knee demonstrate no acute fracture or dislocation  Mild patellofemoral joint space narrowing     Large joint arthrocentesis: L knee  Date/Time: 8/30/2020 12:22 PM  Consent given by: patient  Site marked: site marked  Timeout: Immediately prior to procedure a time out was called to verify the correct patient, procedure, equipment, support staff and site/side marked as required   Supporting Documentation  Indications: pain   Procedure Details  Location: knee - L knee  Preparation: Patient was prepped and draped in the usual sterile fashion  Needle size: 22 G  Approach: anterolateral  Medications administered: 2 mL bupivacaine (PF) 0 5 %; 2 mL lidocaine 1 %; 2 mL methylPREDNISolone acetate 40 mg/mL    Patient tolerance: patient tolerated the procedure well with no immediate complications  Dressing:  Sterile dressing applied            Assessment  Encounter Diagnosis   Name Primary?  Left knee pain, unspecified chronicity Yes           Plan: 78 yo male Left knee osteoarthritis  - Offered and accepted cortisone injection   Patient tolerated procedure well and was able to ambulate and range knee after procedure   - Discussed OTC medications, bracing, icing for pain relief  -Follow up in 4 weeks for reevaluation

## 2020-08-30 PROCEDURE — 20610 DRAIN/INJ JOINT/BURSA W/O US: CPT | Performed by: PHYSICIAN ASSISTANT

## 2020-08-30 RX ADMIN — BUPIVACAINE HYDROCHLORIDE 2 ML: 5 INJECTION, SOLUTION EPIDURAL; INTRACAUDAL at 12:22

## 2020-08-30 RX ADMIN — LIDOCAINE HYDROCHLORIDE 2 ML: 10 INJECTION, SOLUTION INFILTRATION; PERINEURAL at 12:22

## 2020-08-30 RX ADMIN — METHYLPREDNISOLONE ACETATE 2 ML: 40 INJECTION, SUSPENSION INTRA-ARTICULAR; INTRALESIONAL; INTRAMUSCULAR; SOFT TISSUE at 12:22

## 2020-09-04 RX ORDER — BUPIVACAINE HYDROCHLORIDE 5 MG/ML
2 INJECTION, SOLUTION EPIDURAL; INTRACAUDAL
Status: COMPLETED | OUTPATIENT
Start: 2020-08-30 | End: 2020-08-30

## 2020-09-04 RX ORDER — METHYLPREDNISOLONE ACETATE 40 MG/ML
2 INJECTION, SUSPENSION INTRA-ARTICULAR; INTRALESIONAL; INTRAMUSCULAR; SOFT TISSUE
Status: COMPLETED | OUTPATIENT
Start: 2020-08-30 | End: 2020-08-30

## 2020-09-04 RX ORDER — LIDOCAINE HYDROCHLORIDE 10 MG/ML
2 INJECTION, SOLUTION INFILTRATION; PERINEURAL
Status: COMPLETED | OUTPATIENT
Start: 2020-08-30 | End: 2020-08-30

## 2021-01-15 ENCOUNTER — TELEPHONE (OUTPATIENT)
Dept: INTERNAL MEDICINE CLINIC | Facility: CLINIC | Age: 71
End: 2021-01-15

## 2021-01-15 DIAGNOSIS — R29.818 SUSPECTED SLEEP APNEA: Primary | ICD-10-CM

## 2021-01-15 NOTE — TELEPHONE ENCOUNTER
Patient's wife Makayla Stevens calling,  he is ready to do a sleep study now    can a order be issued for a at home sleep study

## 2021-03-03 DIAGNOSIS — Z23 ENCOUNTER FOR IMMUNIZATION: ICD-10-CM

## 2021-03-16 ENCOUNTER — TELEPHONE (OUTPATIENT)
Dept: OTHER | Facility: OTHER | Age: 71
End: 2021-03-16

## 2021-03-16 NOTE — TELEPHONE ENCOUNTER
Pt called to cancel the appointment with BRANDON Marie on 3/17 at 1:15pm  Please call pt to reschedule

## 2021-03-17 NOTE — TELEPHONE ENCOUNTER
Appointment was for prostate cancer screening, history of elevated PSA, S/P negative prostate biopsy  At time of last visit, PSA stable, patient was instructed to follow up in 1 year with PSA  If stable, can discontinue PSA screening  Patient can be scheduled next available with [facility]  PSA was not obtained, please remind patient to have PSA completed prior to appointment

## 2021-03-31 NOTE — TELEPHONE ENCOUNTER
Spoke with patient - asked if he wanted the cancellation for tomorrow 04/01/21 with Lani Rosales and he declined - stated he will call us back to schedule

## 2021-04-13 DIAGNOSIS — I10 BENIGN ESSENTIAL HYPERTENSION: Chronic | ICD-10-CM

## 2021-04-13 RX ORDER — LOSARTAN POTASSIUM 50 MG/1
50 TABLET ORAL DAILY
Qty: 90 TABLET | Refills: 0 | Status: SHIPPED | OUTPATIENT
Start: 2021-04-13 | End: 2021-07-01 | Stop reason: SDUPTHER

## 2021-05-06 ENCOUNTER — TELEPHONE (OUTPATIENT)
Dept: INTERNAL MEDICINE CLINIC | Facility: CLINIC | Age: 71
End: 2021-05-06

## 2021-05-06 NOTE — TELEPHONE ENCOUNTER
Please call pt in regards to his sleep study he had done at [address] in [place]  Call pt back    [phone]

## 2021-05-07 NOTE — TELEPHONE ENCOUNTER
The study shows that the oxygen saturation drops at night    Please make an appointment with Dr Kings Mendez to discuss the results

## 2021-07-01 ENCOUNTER — OFFICE VISIT (OUTPATIENT)
Dept: INTERNAL MEDICINE CLINIC | Facility: CLINIC | Age: 71
End: 2021-07-01
Payer: COMMERCIAL

## 2021-07-01 VITALS
SYSTOLIC BLOOD PRESSURE: 118 MMHG | WEIGHT: 215.6 LBS | OXYGEN SATURATION: 99 % | BODY MASS INDEX: 30.18 KG/M2 | DIASTOLIC BLOOD PRESSURE: 72 MMHG | HEIGHT: 71 IN | TEMPERATURE: 98.2 F | HEART RATE: 72 BPM

## 2021-07-01 DIAGNOSIS — E55.9 VITAMIN D DEFICIENCY: Chronic | ICD-10-CM

## 2021-07-01 DIAGNOSIS — R06.81 APNEA: ICD-10-CM

## 2021-07-01 DIAGNOSIS — I10 BENIGN ESSENTIAL HYPERTENSION: Primary | Chronic | ICD-10-CM

## 2021-07-01 DIAGNOSIS — G47.34 NOCTURNAL HYPOXEMIA: ICD-10-CM

## 2021-07-01 DIAGNOSIS — K21.9 GASTROESOPHAGEAL REFLUX DISEASE WITHOUT ESOPHAGITIS: ICD-10-CM

## 2021-07-01 PROCEDURE — 99214 OFFICE O/P EST MOD 30 MIN: CPT | Performed by: INTERNAL MEDICINE

## 2021-07-01 PROCEDURE — 3288F FALL RISK ASSESSMENT DOCD: CPT | Performed by: INTERNAL MEDICINE

## 2021-07-01 PROCEDURE — 3725F SCREEN DEPRESSION PERFORMED: CPT | Performed by: INTERNAL MEDICINE

## 2021-07-01 PROCEDURE — 1101F PT FALLS ASSESS-DOCD LE1/YR: CPT | Performed by: INTERNAL MEDICINE

## 2021-07-01 RX ORDER — LOSARTAN POTASSIUM 50 MG/1
50 TABLET ORAL DAILY
Qty: 90 TABLET | Refills: 3 | Status: SHIPPED | OUTPATIENT
Start: 2021-07-01 | End: 2021-07-13 | Stop reason: SDUPTHER

## 2021-07-01 NOTE — PATIENT INSTRUCTIONS

## 2021-07-01 NOTE — PROGRESS NOTES
[place]    NAME: Kenji Yoder  AGE: 79 y o  SEX: male  : 1950     DATE: 2021     Assessment and Plan:     1  Benign essential hypertension    Continue losartan  Increase exercise  Weight loss  Heart healthy diet  - losartan (COZAAR) 50 mg tablet; Take 1 tablet (50 mg total) by mouth daily  Dispense: 90 tablet; Refill: 3  - CBC; Future  - Basic metabolic panel; Future  - Lipid panel; Future    2  Gastroesophageal reflux disease without esophagitis    Hasn't need protonix lately  Avoid greasy/spicy foods  Weight loss  - CBC; Future    3  Vitamin D deficiency    Check UTD vitamin D level  - Vitamin D 25 hydroxy; Future    4  Apnea  5  Nocturnal hypoxemia    Significant desaturation for almost a quarter of the night  Will have him follow-up with Dr Alejandro Ocasio  - Ambulatory referral to Sleep Medicine; Future    BMI Counseling: Body mass index is 30 07 kg/m²  The BMI is above normal  Nutrition recommendations include decreasing portion sizes, encouraging healthy choices of fruits and vegetables, limiting drinks that contain sugar, moderation in carbohydrate intake and increasing intake of lean protein  Exercise recommendations include exercising 3-5 times per week  Return in about 5 months (around 2021) for Subsequent AWV  Chief Complaint:     Chief Complaint   Patient presents with    Follow-up      History of Present Illness:     Fabiano Nicholas presents for follow-up  Last seen around 1 year ago  Vitamin D deficiency - forgets to take vitamin D Rx most days    HTN - taking losartan  No cardiac symptoms  BP controlled  GERD - has been watching diet more closely; hasn't required PPI    Apnea episodes - wife got him a home sleep study from insurance due to witnessed episodes of apnea  No significant elevation in AHI to suggest JAY, but he did have significant nocturnal hypoxemia        Review of Systems:     Review of Systems Constitutional: Negative for activity change, appetite change and fatigue  Respiratory: Negative for apnea, cough, chest tightness, shortness of breath and wheezing  Cardiovascular: Negative for chest pain, palpitations and leg swelling  Gastrointestinal: Negative for abdominal distention, abdominal pain, blood in stool, constipation, diarrhea, nausea and vomiting  Neurological: Negative for dizziness, weakness, light-headedness, numbness and headaches  Psychiatric/Behavioral: Negative for behavioral problems, confusion, hallucinations, sleep disturbance and suicidal ideas  The patient is not nervous/anxious  Objective:     /72 (BP Location: Left arm, Patient Position: Sitting, Cuff Size: Standard) Comment: bp  Pulse 72   Temp 98 2 °F (36 8 °C) (Temporal) Comment: NO NSAIDS  Ht 5' 11" (1 803 m)   Wt 97 8 kg (215 lb 9 6 oz)   SpO2 99%   BMI 30 07 kg/m²     Physical Exam  Constitutional:       General: He is not in acute distress  Appearance: He is not ill-appearing  Cardiovascular:      Rate and Rhythm: Normal rate and regular rhythm  Heart sounds: No murmur heard  Pulmonary:      Effort: Pulmonary effort is normal  No respiratory distress  Breath sounds: No wheezing  Abdominal:      General: Bowel sounds are normal  There is no distension  Tenderness: There is no abdominal tenderness  Musculoskeletal:      Right lower leg: No edema  Left lower leg: No edema  Neurological:      Mental Status: He is alert           Makeda Torres DO  [facility] [address] [place] [address]

## 2021-07-07 ENCOUNTER — APPOINTMENT (OUTPATIENT)
Dept: LAB | Facility: CLINIC | Age: 71
End: 2021-07-07
Payer: COMMERCIAL

## 2021-07-07 DIAGNOSIS — K21.9 GASTROESOPHAGEAL REFLUX DISEASE WITHOUT ESOPHAGITIS: ICD-10-CM

## 2021-07-07 DIAGNOSIS — E55.9 VITAMIN D DEFICIENCY: Chronic | ICD-10-CM

## 2021-07-07 DIAGNOSIS — I10 BENIGN ESSENTIAL HYPERTENSION: Chronic | ICD-10-CM

## 2021-07-07 LAB
25(OH)D3 SERPL-MCNC: 27.7 NG/ML (ref 30–100)
ANION GAP SERPL CALCULATED.3IONS-SCNC: 5 MMOL/L (ref 4–13)
BUN SERPL-MCNC: 17 MG/DL (ref 5–25)
CALCIUM SERPL-MCNC: 8.8 MG/DL (ref 8.3–10.1)
CHLORIDE SERPL-SCNC: 111 MMOL/L (ref 100–108)
CHOLEST SERPL-MCNC: 159 MG/DL (ref 50–200)
CO2 SERPL-SCNC: 24 MMOL/L (ref 21–32)
CREAT SERPL-MCNC: 0.97 MG/DL (ref 0.6–1.3)
ERYTHROCYTE [DISTWIDTH] IN BLOOD BY AUTOMATED COUNT: 14.4 % (ref 11.6–15.1)
GFR SERPL CREATININE-BSD FRML MDRD: 78 ML/MIN/1.73SQ M
GLUCOSE P FAST SERPL-MCNC: 78 MG/DL (ref 65–99)
HCT VFR BLD AUTO: 51.7 % (ref 36.5–49.3)
HDLC SERPL-MCNC: 29 MG/DL
HGB BLD-MCNC: 16.6 G/DL (ref 12–17)
LDLC SERPL CALC-MCNC: 105 MG/DL (ref 0–100)
MCH RBC QN AUTO: 29 PG (ref 26.8–34.3)
MCHC RBC AUTO-ENTMCNC: 32.1 G/DL (ref 31.4–37.4)
MCV RBC AUTO: 90 FL (ref 82–98)
NONHDLC SERPL-MCNC: 130 MG/DL
PLATELET # BLD AUTO: 148 THOUSANDS/UL (ref 149–390)
PMV BLD AUTO: 12.2 FL (ref 8.9–12.7)
POTASSIUM SERPL-SCNC: 4.3 MMOL/L (ref 3.5–5.3)
RBC # BLD AUTO: 5.73 MILLION/UL (ref 3.88–5.62)
SODIUM SERPL-SCNC: 140 MMOL/L (ref 136–145)
TRIGL SERPL-MCNC: 123 MG/DL
WBC # BLD AUTO: 7.3 THOUSAND/UL (ref 4.31–10.16)

## 2021-07-07 PROCEDURE — 82306 VITAMIN D 25 HYDROXY: CPT

## 2021-07-07 PROCEDURE — 80061 LIPID PANEL: CPT

## 2021-07-07 PROCEDURE — 80048 BASIC METABOLIC PNL TOTAL CA: CPT

## 2021-07-07 PROCEDURE — 85027 COMPLETE CBC AUTOMATED: CPT

## 2021-07-07 PROCEDURE — 36415 COLL VENOUS BLD VENIPUNCTURE: CPT

## 2021-07-08 ENCOUNTER — TELEPHONE (OUTPATIENT)
Dept: INTERNAL MEDICINE CLINIC | Facility: CLINIC | Age: 71
End: 2021-07-08

## 2021-07-08 NOTE — TELEPHONE ENCOUNTER
----- Message from Colton Christiansen DO sent at 7/8/2021  7:06 AM EDT -----  Labs look good  Vitamin D level has improved from previous  Still just mildly low  Can take 5000 units vitamin D3 daily

## 2021-07-13 ENCOUNTER — OFFICE VISIT (OUTPATIENT)
Dept: INTERNAL MEDICINE CLINIC | Facility: CLINIC | Age: 71
End: 2021-07-13
Payer: COMMERCIAL

## 2021-07-13 VITALS
TEMPERATURE: 97.6 F | HEIGHT: 71 IN | OXYGEN SATURATION: 97 % | DIASTOLIC BLOOD PRESSURE: 86 MMHG | WEIGHT: 214.6 LBS | RESPIRATION RATE: 16 BRPM | HEART RATE: 85 BPM | SYSTOLIC BLOOD PRESSURE: 150 MMHG | BODY MASS INDEX: 30.04 KG/M2

## 2021-07-13 DIAGNOSIS — I10 BENIGN ESSENTIAL HYPERTENSION: Chronic | ICD-10-CM

## 2021-07-13 DIAGNOSIS — H61.23 BILATERAL IMPACTED CERUMEN: ICD-10-CM

## 2021-07-13 DIAGNOSIS — H61.22 IMPACTED CERUMEN OF LEFT EAR: Primary | ICD-10-CM

## 2021-07-13 DIAGNOSIS — I10 ESSENTIAL HYPERTENSION: ICD-10-CM

## 2021-07-13 PROCEDURE — 1160F RVW MEDS BY RX/DR IN RCRD: CPT | Performed by: NURSE PRACTITIONER

## 2021-07-13 PROCEDURE — 3079F DIAST BP 80-89 MM HG: CPT | Performed by: NURSE PRACTITIONER

## 2021-07-13 PROCEDURE — 1036F TOBACCO NON-USER: CPT | Performed by: NURSE PRACTITIONER

## 2021-07-13 PROCEDURE — 3008F BODY MASS INDEX DOCD: CPT | Performed by: NURSE PRACTITIONER

## 2021-07-13 PROCEDURE — 99214 OFFICE O/P EST MOD 30 MIN: CPT | Performed by: NURSE PRACTITIONER

## 2021-07-13 PROCEDURE — 3077F SYST BP >= 140 MM HG: CPT | Performed by: NURSE PRACTITIONER

## 2021-07-13 RX ORDER — LOSARTAN POTASSIUM 50 MG/1
50 TABLET ORAL DAILY
Qty: 90 TABLET | Refills: 3 | Status: SHIPPED | OUTPATIENT
Start: 2021-07-13

## 2021-07-13 RX ORDER — LOSARTAN POTASSIUM 50 MG/1
50 TABLET ORAL DAILY
Qty: 90 TABLET | Refills: 3 | Status: CANCELLED | OUTPATIENT
Start: 2021-07-13

## 2021-07-13 NOTE — PROGRESS NOTES
[place]    NAME: Meredith Johnson  AGE: 70 y o  SEX: male  : 1950     DATE: 2021     Assessment and Plan:     Problem List Items Addressed This Visit        Cardiovascular and Mediastinum    Essential hypertension       Patient's pressure is elevated in the office today at 150/86  He is slightly anxious  Patient will continue to monitor his blood pressures at home  He denies any other symptoms such as chest pain or shortness of breath  Relevant Orders    Comprehensive metabolic panel       Nervous and Auditory    Bilateral impacted cerumen - Primary       Patient presents to the office today with concern of decreased hearing   in his bilateral ears as well as some dizziness     Patient also states that over the weekend he felt a bug fly into his left ear and is afraid that the bug is still there  Upon exam the patient does have impacted cerumen in his bilateral ear canals  He does use Q-tips  Irrigation of his ear canals was performed  I was able to remove all of the wax from his right ear  Small amount of wax was removed from his left ear  I did recommend at this point the patient make an appointment with an ENT provider for removal of the wax from his left ear  He was provided a referral                    No follow-ups on file  Chief Complaint:     Chief Complaint   Patient presents with    Dizziness     possibly from a bug in his ear, trouble hearing, off balance        History of Present Illness:     Jeremias Gibson to the office today for concerns of decreased hearing, dizziness, fullness in his ears, and concerned that a bug may have flown into his left ear  This is all discussed in the above assessment and plan  The patient has been referred to ENT for retained cerumen in his left ear canal   He will contact us should his symptoms persist      Review of Systems:     Review of Systems   Constitutional: Negative  Negative for fatigue  HENT: Positive for ear pain (fullness, decreased hearing)  Negative for congestion, postnasal drip, rhinorrhea and trouble swallowing  Eyes: Negative  Negative for visual disturbance  Respiratory: Negative  Negative for choking and shortness of breath  Cardiovascular: Negative  Negative for chest pain  Gastrointestinal: Negative  Endocrine: Negative  Genitourinary: Negative  Musculoskeletal: Negative  Negative for arthralgias, back pain, myalgias and neck pain  Skin: Negative  Neurological: Positive for dizziness  Negative for headaches  Psychiatric/Behavioral: Negative  Problem List:     Patient Active Problem List   Diagnosis    Benign essential hypertension    Vitamin D deficiency    Nocturnal hypoxemia    Right bundle branch block (RBBB)    Right knee pain    Gastroesophageal reflux disease without esophagitis        Objective:     /86 (BP Location: Left arm, Patient Position: Sitting, Cuff Size: Standard)   Pulse 85   Temp 97 6 °F (36 4 °C) (Temporal) Comment: no nsaids  Resp 16   Ht 5' 11" (1 803 m)   Wt 97 3 kg (214 lb 9 6 oz)   SpO2 97%   BMI 29 93 kg/m²       Current Outpatient Medications   Medication Instructions    Cholecalciferol (VITAMIN D3) 5000 units CAPS Daily    ergocalciferol (VITAMIN D2) 50,000 Units, Oral, Weekly    losartan (COZAAR) 50 mg, Oral, Daily    vitamin C 1,000 mg, Oral, Daily       Physical Exam  Vitals reviewed  Constitutional:       Appearance: Normal appearance  He is obese  HENT:      Head: Normocephalic and atraumatic  Right Ear: Decreased hearing noted  A foreign body is present  Left Ear: Decreased hearing noted  A foreign body is present  Ears:      Comments: Impacted cerumen bilateral ears     Nose: Nose normal       Mouth/Throat:      Mouth: Mucous membranes are moist    Eyes:      Extraocular Movements: Extraocular movements intact        Pupils: Pupils are equal, round, and reactive to light  Cardiovascular:      Rate and Rhythm: Normal rate and regular rhythm  Pulses: Normal pulses  Heart sounds: Normal heart sounds  Pulmonary:      Effort: Pulmonary effort is normal       Breath sounds: Normal breath sounds  Musculoskeletal:         General: Normal range of motion  Skin:     General: Skin is warm  Neurological:      General: No focal deficit present  Mental Status: He is alert and oriented to person, place, and time  Psychiatric:         Mood and Affect: Mood normal          Behavior: Behavior normal          Thought Content:  Thought content normal          Judgment: Judgment normal          Luis Eduardo Spine, [address]

## 2021-07-14 PROBLEM — R26.89 IMBALANCE: Status: ACTIVE | Noted: 2021-07-14

## 2021-07-14 PROBLEM — H61.23 BILATERAL IMPACTED CERUMEN: Status: ACTIVE | Noted: 2021-07-14

## 2021-07-14 PROBLEM — H61.22 IMPACTED CERUMEN OF LEFT EAR: Status: ACTIVE | Noted: 2021-07-14

## 2021-07-14 PROBLEM — H90.A31 MIXED CONDUCTIVE AND SENSORINEURAL HEARING LOSS OF RIGHT EAR WITH RESTRICTED HEARING OF LEFT EAR: Status: ACTIVE | Noted: 2021-07-14

## 2021-07-14 NOTE — ASSESSMENT & PLAN NOTE
Patient's pressure is elevated in the office today at 150/86  He is slightly anxious  Patient will continue to monitor his blood pressures at home  He denies any other symptoms such as chest pain or shortness of breath

## 2021-07-14 NOTE — ASSESSMENT & PLAN NOTE
Patient presents to the office today with concern of decreased hearing   in his bilateral ears as well as some dizziness     Patient also states that over the weekend he felt a bug fly into his left ear and is afraid that the bug is still there  Upon exam the patient does have impacted cerumen in his bilateral ear canals  He does use Q-tips  Irrigation of his ear canals was performed  I was able to remove all of the wax from his right ear  Small amount of wax was removed from his left ear  I did recommend at this point the patient make an appointment with an ENT provider for removal of the wax from his left ear    He was provided a referral

## 2021-07-21 PROBLEM — R42 DIZZINESS: Status: ACTIVE | Noted: 2021-07-14

## 2021-08-11 ENCOUNTER — HOSPITAL ENCOUNTER (OUTPATIENT)
Dept: CT IMAGING | Facility: HOSPITAL | Age: 71
Discharge: HOME | End: 2021-08-11
Payer: COMMERCIAL

## 2021-08-11 DIAGNOSIS — H90.A31 MIXED CONDUCTIVE AND SENSORINEURAL HEARING LOSS OF RIGHT EAR WITH RESTRICTED HEARING OF LEFT EAR: ICD-10-CM

## 2021-08-11 DIAGNOSIS — H80.91 OTOSCLEROSIS OF RIGHT EAR: ICD-10-CM

## 2021-08-11 PROCEDURE — 70480 CT ORBIT/EAR/FOSSA W/O DYE: CPT

## 2021-09-21 ENCOUNTER — OFFICE VISIT (OUTPATIENT)
Dept: PULMONOLOGY | Facility: CLINIC | Age: 71
End: 2021-09-21
Payer: COMMERCIAL

## 2021-09-21 VITALS
SYSTOLIC BLOOD PRESSURE: 124 MMHG | HEIGHT: 68 IN | BODY MASS INDEX: 33.04 KG/M2 | WEIGHT: 218 LBS | OXYGEN SATURATION: 97 % | TEMPERATURE: 69 F | HEART RATE: 64 BPM | DIASTOLIC BLOOD PRESSURE: 90 MMHG

## 2021-09-21 DIAGNOSIS — R29.818 SUSPECTED SLEEP APNEA: ICD-10-CM

## 2021-09-21 DIAGNOSIS — E66.9 OBESITY (BMI 30-39.9): ICD-10-CM

## 2021-09-21 DIAGNOSIS — G47.33 OSA (OBSTRUCTIVE SLEEP APNEA): Primary | ICD-10-CM

## 2021-09-21 PROCEDURE — 1036F TOBACCO NON-USER: CPT | Performed by: INTERNAL MEDICINE

## 2021-09-21 PROCEDURE — 3080F DIAST BP >= 90 MM HG: CPT | Performed by: INTERNAL MEDICINE

## 2021-09-21 PROCEDURE — 3074F SYST BP LT 130 MM HG: CPT | Performed by: INTERNAL MEDICINE

## 2021-09-21 PROCEDURE — 99204 OFFICE O/P NEW MOD 45 MIN: CPT | Performed by: INTERNAL MEDICINE

## 2021-09-21 PROCEDURE — 1160F RVW MEDS BY RX/DR IN RCRD: CPT | Performed by: INTERNAL MEDICINE

## 2021-09-21 PROCEDURE — 3008F BODY MASS INDEX DOCD: CPT | Performed by: INTERNAL MEDICINE

## 2021-09-21 NOTE — PROGRESS NOTES
Assessment/Plan:     Diagnoses and all orders for this visit:    JAY (obstructive sleep apnea)  -     Diagnostic Sleep Study; Future    Suspected sleep apnea  -     Home Study    Obesity (BMI 30-39  9)          Plan for follow up:  Patient has signs and symptoms of obstructive sleep apnea   Etiology pathogenesis of obstructive sleep apnea discussed in detail   Consequences of untreated sleep apnea discussed   He will undergo an all-night polysomnogram and if there is evidence of abnormal nocturnal breathing he will undergo a CPAP titration study for maximal benefit  His recent hopes sleep study was discussed that was done about a year ago at outside facility, with no evidence of obstructive sleep apnea with an AHI of 2 0 , discussed the AHI can be underestimated in a home sleep study and also given his multiple nocturnal awakenings and cannot fall back asleep after nap the a AHI I am may not be reliable in a home sleep study understands and verbalized  Given his symptoms and his prior history of obstructive sleep apnea and the symptoms of snoring and witnessed apneic spells worsening he would benefit from an in-lab diagnostic study to rule out obstructive sleep apnea  Testing procedure was discussed understands and verbalizes   He states he would like to think about it but he wanted the order for the diagnostic sleep study and he would like to schedule it on his own  Recommend weight loss     No follow-ups on file  All questions are answered to the patient's satisfaction and understanding  He verbalizes understanding  He is encouraged to call with any further questions or concerns  Portions of the record may have been created with voice recognition software  Occasional wrong word or "sound a like" substitutions may have occurred due to the inherent limitations of voice recognition software  Read the chart carefully and recognize, using context, where substitutions have occurred  a    Electronically Signed by Irineo Fregoso MD    ______________________________________________________________________    Chief Complaint:   Chief Complaint   Patient presents with    Sleep Apnea        Patient ID: Carolee Rodriguez is a 70 y o  y o  male has a past medical history of Arthritis of carpometacarpal (CMC) joint of thumb, Bundle branch block, right, Complex tear of lateral meniscus of right knee (5/15/2019), Dupuytren's contracture, Elevated PSA, Hypertension, JAY (obstructive sleep apnea), Polycythemia, and Thrombocytopenia ([facility] [address] )  9/21/2021  Patient presents today for initial visit  Carolee Rodriguez is a very pleasant 26-year-old gentleman, states he was diagnosed with obstructive sleep apnea several years ago about 20 years ago with mild to moderate obstructive sleep apnea and he declined CPAP treatment then and he states since then he has not followed up with any pulmonary/sleep medicine doctor he states  Recently given his history of snoring witnessed apneic spells he was ordered for a home sleep study and he is here for evaluation  He states his daily sleep schedule is he goes to bed at around 10 p m  falls asleep in less than half an hour but has multiple nocturnal awakenings 4-5 times before his out of the bed at 6 a m  and when he is out of the bed for the nocturnal awakenings mostly for nocturia he states and he cannot fall back asleep for more than an hour and when he is out of the bed in the morning he still tired and fatigued does take a nap in the afternoon for about half an hour to 40 minutes and he states he feels well rested after that  His wife who has accompanied this office visit states he has history of significant loud snoring and witnessed apneic spells and choking and gasping for air  No symptoms related to restless leg    primary symptoms  Associated symptoms include fatigue  Pertinent negatives include no chest pain, fever, headaches, myalgias or sore throat         Occupational/Exposure history: Currently retired  Pets/Enviroment:  None  Travel history:  None  Review of Systems   Constitutional: Positive for fatigue  Negative for appetite change and fever  HENT: Negative for ear pain, postnasal drip, rhinorrhea, sneezing, sore throat and trouble swallowing  Eyes: Negative  Respiratory:        History of snoring witnessed apneic spells choking and gasping for air  Cardiovascular: Negative  Negative for chest pain  Gastrointestinal: Negative  Genitourinary: Negative  Musculoskeletal: Negative  Negative for myalgias  Skin: Negative  Allergic/Immunologic: Negative  Neurological: Negative for headaches  Hematological: Negative  Psychiatric/Behavioral: Positive for sleep disturbance  Social history: He reports that he has never smoked  He has never used smokeless tobacco  He reports current alcohol use  He reports that he does not use drugs      Past surgical history:   Past Surgical History:   Procedure Laterality Date    APPENDECTOMY      EYE SURGERY      KNEE SURGERY Left     arthroscopy    MN KNEE SCOPE,MED+LAT MENIS REPAIR Right 5/23/2019    Procedure: KNEE ARTHROSCOPIC MEDIAL AND LATERAL MENISCAL REPAIR; REMOVAL LOOSE BODY; CORTISONE INJECTION;  Surgeon: Eben Redd MD;  Location: [facility] OR;  Service: Orthopedics    PROSTATE BIOPSY      prostate punch     Family history:   Family History   Problem Relation Age of Onset    Cancer Father     No Known Problems Mother     Hypertension Family        Immunization History   Administered Date(s) Administered    Pneumococcal Polysaccharide PPV23 07/02/2020    SARS-CoV-2 / COVID-19 mRNA IM ([employer]) 02/25/2021, 03/20/2021     Current Outpatient Medications   Medication Sig Dispense Refill    Ascorbic Acid (vitamin C) 1000 MG tablet Take 1,000 mg by mouth daily      Cholecalciferol (VITAMIN D3) 5000 units CAPS daily       losartan (COZAAR) 50 mg tablet Take 1 tablet (50 mg total) by mouth daily 90 tablet 3    ergocalciferol (VITAMIN D2) 50,000 units Take 1 capsule (50,000 Units total) by mouth once a week 12 capsule 0     No current facility-administered medications for this visit  Allergies: Patient has no known allergies  Objective:  Vitals:    09/21/21 0907   BP: 124/90   Pulse: 64   Temp: (!) 69 °F (20 6 °C)   SpO2: 97%   Weight: 98 9 kg (218 lb)   Height: 5' 8" (1 727 m)   Oxygen Therapy  SpO2: 97 %    Wt Readings from Last 3 Encounters:   09/21/21 98 9 kg (218 lb)   07/14/21 97 1 kg (214 lb)   07/13/21 97 3 kg (214 lb 9 6 oz)     Body mass index is 33 15 kg/m²  Physical Exam  Constitutional:       Appearance: He is well-developed  HENT:      Head: Normocephalic and atraumatic  Eyes:      Pupils: Pupils are equal, round, and reactive to light  Neck:      Comments: Short and wide neck  Cardiovascular:      Rate and Rhythm: Normal rate and regular rhythm  Heart sounds: Normal heart sounds  Pulmonary:      Effort: Pulmonary effort is normal       Breath sounds: Normal breath sounds  Musculoskeletal:         General: Normal range of motion  Cervical back: Normal range of motion and neck supple  Skin:     General: Skin is warm and dry  Neurological:      Mental Status: He is alert and oriented to person, place, and time  Psychiatric:         Behavior: Behavior normal            Diagnostics:  I have personally reviewed pertinent reports      ESS: Total score: 6    Answers for HPI/ROS submitted by the patient on 9/21/2021  Do you experience frequent throat clearing?: Yes  Chronicity: chronic  When did you first notice your symptoms?: more than 1 year ago  How often do your symptoms occur?: 2 to 4 times per day  Since you first noticed this problem, how has it changed?: unchanged  Do you have shortness of breath that occurs with effort or exertion?: Yes  Do you have ear congestion?: No  Do you have heartburn?: No  Do you have fatigue?: Yes  Do you have nasal congestion?: Yes  Do you have shortness of breath when lying flat?: No  Do you have shortness of breath when you wake up?: No  Do you have sweats?: No  Have you experienced weight loss?: No

## 2022-01-31 ENCOUNTER — TELEPHONE (OUTPATIENT)
Dept: INTERNAL MEDICINE CLINIC | Facility: CLINIC | Age: 72
End: 2022-01-31

## 2022-01-31 DIAGNOSIS — Z12.11 SCREENING FOR MALIGNANT NEOPLASM OF COLON: Primary | ICD-10-CM

## 2022-03-15 ENCOUNTER — OFFICE VISIT (OUTPATIENT)
Dept: INTERNAL MEDICINE CLINIC | Facility: CLINIC | Age: 72
End: 2022-03-15
Payer: COMMERCIAL

## 2022-03-15 ENCOUNTER — APPOINTMENT (OUTPATIENT)
Dept: LAB | Facility: CLINIC | Age: 72
End: 2022-03-15
Payer: COMMERCIAL

## 2022-03-15 VITALS
TEMPERATURE: 100.5 F | WEIGHT: 215.6 LBS | HEART RATE: 77 BPM | BODY MASS INDEX: 32.67 KG/M2 | OXYGEN SATURATION: 98 % | SYSTOLIC BLOOD PRESSURE: 130 MMHG | HEIGHT: 68 IN | DIASTOLIC BLOOD PRESSURE: 80 MMHG

## 2022-03-15 DIAGNOSIS — R50.9 FEVER, UNSPECIFIED FEVER CAUSE: ICD-10-CM

## 2022-03-15 DIAGNOSIS — J02.9 PHARYNGITIS, UNSPECIFIED ETIOLOGY: Primary | ICD-10-CM

## 2022-03-15 DIAGNOSIS — J02.9 PHARYNGITIS, UNSPECIFIED ETIOLOGY: ICD-10-CM

## 2022-03-15 LAB
ALBUMIN SERPL BCP-MCNC: 3.7 G/DL (ref 3.5–5)
ALP SERPL-CCNC: 112 U/L (ref 46–116)
ALT SERPL W P-5'-P-CCNC: 30 U/L (ref 12–78)
ANION GAP SERPL CALCULATED.3IONS-SCNC: 5 MMOL/L (ref 4–13)
AST SERPL W P-5'-P-CCNC: 18 U/L (ref 5–45)
BASOPHILS # BLD AUTO: 0.05 THOUSANDS/ΜL (ref 0–0.1)
BASOPHILS NFR BLD AUTO: 0 % (ref 0–1)
BILIRUB SERPL-MCNC: 0.85 MG/DL (ref 0.2–1)
BUN SERPL-MCNC: 15 MG/DL (ref 5–25)
CALCIUM SERPL-MCNC: 9.3 MG/DL (ref 8.3–10.1)
CHLORIDE SERPL-SCNC: 108 MMOL/L (ref 100–108)
CO2 SERPL-SCNC: 26 MMOL/L (ref 21–32)
CREAT SERPL-MCNC: 1 MG/DL (ref 0.6–1.3)
EOSINOPHIL # BLD AUTO: 0.08 THOUSAND/ΜL (ref 0–0.61)
EOSINOPHIL NFR BLD AUTO: 1 % (ref 0–6)
ERYTHROCYTE [DISTWIDTH] IN BLOOD BY AUTOMATED COUNT: 14.7 % (ref 11.6–15.1)
GFR SERPL CREATININE-BSD FRML MDRD: 75 ML/MIN/1.73SQ M
GLUCOSE P FAST SERPL-MCNC: 93 MG/DL (ref 65–99)
HCT VFR BLD AUTO: 54 % (ref 36.5–49.3)
HGB BLD-MCNC: 17.2 G/DL (ref 12–17)
IMM GRANULOCYTES # BLD AUTO: 0.03 THOUSAND/UL (ref 0–0.2)
IMM GRANULOCYTES NFR BLD AUTO: 0 % (ref 0–2)
LYMPHOCYTES # BLD AUTO: 1.99 THOUSANDS/ΜL (ref 0.6–4.47)
LYMPHOCYTES NFR BLD AUTO: 17 % (ref 14–44)
MCH RBC QN AUTO: 28.5 PG (ref 26.8–34.3)
MCHC RBC AUTO-ENTMCNC: 31.9 G/DL (ref 31.4–37.4)
MCV RBC AUTO: 89 FL (ref 82–98)
MONOCYTES # BLD AUTO: 1.08 THOUSAND/ΜL (ref 0.17–1.22)
MONOCYTES NFR BLD AUTO: 9 % (ref 4–12)
NEUTROPHILS # BLD AUTO: 8.45 THOUSANDS/ΜL (ref 1.85–7.62)
NEUTS SEG NFR BLD AUTO: 73 % (ref 43–75)
NRBC BLD AUTO-RTO: 0 /100 WBCS
PLATELET # BLD AUTO: 170 THOUSANDS/UL (ref 149–390)
PMV BLD AUTO: 11.4 FL (ref 8.9–12.7)
POTASSIUM SERPL-SCNC: 4.3 MMOL/L (ref 3.5–5.3)
PROT SERPL-MCNC: 7.7 G/DL (ref 6.4–8.2)
RBC # BLD AUTO: 6.04 MILLION/UL (ref 3.88–5.62)
S PYO AG THROAT QL: NEGATIVE
SODIUM SERPL-SCNC: 139 MMOL/L (ref 136–145)
WBC # BLD AUTO: 11.68 THOUSAND/UL (ref 4.31–10.16)

## 2022-03-15 PROCEDURE — 87880 STREP A ASSAY W/OPTIC: CPT

## 2022-03-15 PROCEDURE — 80053 COMPREHEN METABOLIC PANEL: CPT

## 2022-03-15 PROCEDURE — 86308 HETEROPHILE ANTIBODY SCREEN: CPT

## 2022-03-15 PROCEDURE — 87070 CULTURE OTHR SPECIMN AEROBIC: CPT

## 2022-03-15 PROCEDURE — 1160F RVW MEDS BY RX/DR IN RCRD: CPT

## 2022-03-15 PROCEDURE — 87147 CULTURE TYPE IMMUNOLOGIC: CPT

## 2022-03-15 PROCEDURE — 36415 COLL VENOUS BLD VENIPUNCTURE: CPT

## 2022-03-15 PROCEDURE — 85025 COMPLETE CBC W/AUTO DIFF WBC: CPT

## 2022-03-15 PROCEDURE — 99214 OFFICE O/P EST MOD 30 MIN: CPT

## 2022-03-15 PROCEDURE — 3075F SYST BP GE 130 - 139MM HG: CPT

## 2022-03-15 NOTE — PROGRESS NOTES
[facility]    NAME: Taj Mahajan  AGE: 70 y o  SEX: male  : 1950     DATE: 3/15/2022     Assessment and Plan:     1  Pharyngitis, unspecified etiology  Patient has 2 weeks of sore throat  He states initially it was severely sore  Reports hoarseness and loss of voice for several days  Denies fever chills  Today in the office his temperature 3 checked as he felt warm and it is 100 5 tympanic  His face is flushed as lips are dry  His tonsils are enlarged  Posterior pharynx is positive for erythema  No exudate  Positive for cervical lymphadenopathy  Strep test knee office is negative  Throat culture was obtained  Will test for mono  Tylenol Motrin for fever and pain, saltwater gargles, throat lozenges, lemon  Patient was instructed that if he had difficulty swallowing secretions, worsening pain or fever to be evaluated emergency department  - CBC and differential; Future  - Comprehensive metabolic panel; Future  - Mononucleosis screen; Future  - POCT rapid strepA  - Throat culture; Future    2  Fever, unspecified fever cause  Tylenol Motrin for fever    - CBC and differential; Future  - Comprehensive metabolic panel; Future  - Mononucleosis screen; Future  - POCT rapid strepA  - Throat culture; Future        No follow-ups on file  Chief Complaint:     Chief Complaint   Patient presents with    Sinus Problem        History of Present Illness:     Sania Flaherty presents to the office today for evaluation of sore throat, cough, congestion and malaise that started about 2 weeks ago  He states 2 weeks ago he was feeling pretty on well from a severe sore throat, coughing postnasal drip  Shannon Hargrove He states he was in bed most of the week  He said he started to feel better and most symptoms resolved  He states he went to [place] last week and by Friday symptoms started to return  He states he got home and by  was in bed again most of the day    He denies fevers or chills, shortness of breath or chest pain, nausea vomiting or diarrhea  He states his cough is productive for small amount of phlegm  Review of Systems:     Review of Systems   Constitutional: Positive for fatigue  Negative for diaphoresis and fever  HENT: Positive for congestion, postnasal drip, sore throat and voice change (hoarse)  Negative for drooling and trouble swallowing  Eyes: Negative  Respiratory: Positive for cough  Negative for shortness of breath and wheezing  Cardiovascular: Negative  Gastrointestinal: Negative  Endocrine: Negative  Genitourinary: Negative  Musculoskeletal: Negative  Skin: Negative  Allergic/Immunologic: Negative  Neurological: Negative  Hematological: Negative  Psychiatric/Behavioral: Negative  Problem List:     Patient Active Problem List   Diagnosis    Essential hypertension    Vitamin D deficiency    Nocturnal hypoxemia    Right bundle branch block (RBBB)    Right knee pain    Gastroesophageal reflux disease without esophagitis    Impacted cerumen of left ear    Dizziness    Mixed conductive and sensorineural hearing loss of right ear with restricted hearing of left ear        Objective:     /80   Pulse 77   Temp 98 5 °F (36 9 °C)   Ht 5' 8" (1 727 m)   Wt 97 8 kg (215 lb 9 6 oz)   SpO2 98%   BMI 32 78 kg/m²     Physical Exam  Constitutional:       General: He is not in acute distress  Appearance: He is ill-appearing  HENT:      Head: Normocephalic and atraumatic  Right Ear: Tympanic membrane normal       Left Ear: Tympanic membrane normal       Nose: Congestion present  Mouth/Throat:      Mouth: Mucous membranes are moist       Pharynx: Posterior oropharyngeal erythema present  No oropharyngeal exudate  Tonsils: No tonsillar exudate  1+ on the right  2+ on the left        Comments: Lips dry  Eyes:      Conjunctiva/sclera: Conjunctivae normal    Cardiovascular:      Rate and Rhythm: Normal rate and regular rhythm  Pulses: Normal pulses  Heart sounds: Normal heart sounds  Pulmonary:      Effort: No respiratory distress  Breath sounds: Normal breath sounds  No wheezing  Abdominal:      General: There is no distension  Palpations: Abdomen is soft  Tenderness: There is no abdominal tenderness  Musculoskeletal:         General: Normal range of motion  Cervical back: Normal range of motion and neck supple  No tenderness  Lymphadenopathy:      Cervical: Cervical adenopathy present  Right cervical: Superficial cervical adenopathy present  Left cervical: Superficial cervical adenopathy present  Skin:     General: Skin is warm  Capillary Refill: Capillary refill takes less than 2 seconds  Comments: Face flushed   Neurological:      Mental Status: He is alert and oriented to person, place, and time  Psychiatric:         Mood and Affect: Mood normal          Behavior: Behavior normal          Thought Content: Thought content normal          Judgment: Judgment normal          I spent 15 minutes with this patient      JAVIER Wise  [facility]

## 2022-03-15 NOTE — PATIENT INSTRUCTIONS
Tylenol and Advil for fever and pain  Salt water gargles, honey, lemon, tea; throat lozenges  Claritin, allegra, or zyrtec over the counter    Pharyngitis   AMBULATORY CARE:   Pharyngitis , or sore throat, is inflammation of the tissues and structures in your pharynx (throat)  Pharyngitis is most often caused by bacteria  It may also be caused by a cold or flu virus  Other causes include smoking, allergies, or acid reflux  Signs and symptoms that may occur with pharyngitis:   · Sore throat or pain when you swallow    · Fever, chills, and body aches    · Hoarse or raspy voice    · Cough, runny or stuffy nose, itchy or watery eyes    · Headache    · Upset stomach and loss of appetite    · Mild neck stiffness    · Swollen glands that feel like hard lumps when you touch your neck    · White and yellow pus-filled blisters in the back of your throat    Call 911 for any of the following:   · You have trouble breathing or swallowing because your throat is swollen or sore  Seek care immediately if:   · You are drooling because it hurts too much to swallow  · Your fever is higher than 102? F (39?C) or lasts longer than 3 days  · You are confused  · You taste blood in your throat  Contact your healthcare provider if:   · Your throat pain gets worse  · You have a painful lump in your throat that does not go away after 5 days  · Your symptoms do not improve after 5 days  · You have questions or concerns about your condition or care  Treatment for pharyngitis:  Viral pharyngitis will go away on its own without treatment  Your sore throat should start to feel better in 3 to 5 days for both viral and bacterial infections  You may need any of the following:  · Antibiotics  treat a bacterial infection  · NSAIDs , such as ibuprofen, help decrease swelling, pain, and fever  NSAIDs can cause stomach bleeding or kidney problems in certain people   If you take blood thinner medicine, always ask your healthcare provider if NSAIDs are safe for you  Always read the medicine label and follow directions  · Acetaminophen  decreases pain and fever  It is available without a doctor's order  Ask how much to take and how often to take it  Follow directions  Acetaminophen can cause liver damage if not taken correctly  Manage your symptoms:   · Gargle salt water  Mix ¼ teaspoon salt in an 8 ounce glass of warm water and gargle  This may help decrease swelling in your throat  · Drink liquids as directed  You may need to drink more liquids than usual  Liquids may help soothe your throat and prevent dehydration  Ask how much liquid to drink each day and which liquids are best for you  · Use a cool-steam humidifier  to help moisten the air in your room and calm your cough  · Soothe your throat  with cough drops, ice, soft foods, or popsicles  Prevent the spread of pharyngitis:  Cover your mouth and nose when you cough or sneeze  Do not share food or drinks  Wash your hands often  Use soap and water  If soap and water are unavailable, use an alcohol based hand   Follow up with your doctor as directed:  Write down your questions so you remember to ask them during your visits  © Copyright [employer] 2022 Information is for End User's use only and may not be sold, redistributed or otherwise used for commercial purposes  All illustrations and images included in CareNotes® are the copyrighted property of [employer]  or [employer]  The above information is an  only  It is not intended as medical advice for individual conditions or treatments  Talk to your doctor, nurse or pharmacist before following any medical regimen to see if it is safe and effective for you

## 2022-03-16 ENCOUNTER — TELEPHONE (OUTPATIENT)
Dept: INTERNAL MEDICINE CLINIC | Facility: CLINIC | Age: 72
End: 2022-03-16

## 2022-03-16 DIAGNOSIS — R59.0 CERVICAL LYMPHADENOPATHY: ICD-10-CM

## 2022-03-16 DIAGNOSIS — R50.9 FEVER, UNSPECIFIED FEVER CAUSE: Primary | ICD-10-CM

## 2022-03-16 DIAGNOSIS — J02.9 PHARYNGITIS, UNSPECIFIED ETIOLOGY: ICD-10-CM

## 2022-03-16 DIAGNOSIS — R53.83 OTHER FATIGUE: ICD-10-CM

## 2022-03-16 DIAGNOSIS — R05.9 COUGH: ICD-10-CM

## 2022-03-16 LAB — HETEROPH AB SER QL: NEGATIVE

## 2022-03-16 NOTE — PROGRESS NOTES
Spoke to patient's wife on the phone who reports that Nithya Wang is feeling little worse today  Continues with increased malaise and fatigue  There is no fever this morning  Increasing cough  I encouraged her to push fluids  We will have him repeat CBC today as well as additional blood work and chest x-ray  Will get urine blood culture  If viral panels are negative and patient continues with symptoms with fever start antibiotic

## 2022-03-16 NOTE — TELEPHONE ENCOUNTER
Saw Alyssa yesterday; coughing- feels worse    Can the dr order something for the cough?   Also advise on how to take care of this    Oxygen saturation at 94% at rest  97% after going up steps     His wife might want anything sent to local pharmacy     Can Alyssa put in an order for a COVID test to be done  Call pt when order is in    His wife said that the home test done was a rapid one

## 2022-03-17 ENCOUNTER — HOSPITAL ENCOUNTER (OUTPATIENT)
Dept: RADIOLOGY | Facility: HOSPITAL | Age: 72
Discharge: HOME | End: 2022-03-17
Payer: COMMERCIAL

## 2022-03-17 ENCOUNTER — APPOINTMENT (OUTPATIENT)
Dept: LAB | Facility: HOSPITAL | Age: 72
End: 2022-03-17
Payer: COMMERCIAL

## 2022-03-17 ENCOUNTER — OFFICE VISIT (OUTPATIENT)
Dept: LAB | Facility: HOSPITAL | Age: 72
End: 2022-03-17
Payer: COMMERCIAL

## 2022-03-17 DIAGNOSIS — H90.71 MIXED CONDUCTIVE AND SENSORINEURAL HEARING LOSS OF RIGHT EAR, UNSPECIFIED HEARING STATUS ON CONTRALATERAL SIDE: ICD-10-CM

## 2022-03-17 DIAGNOSIS — R53.83 OTHER FATIGUE: ICD-10-CM

## 2022-03-17 DIAGNOSIS — R50.9 FEVER, UNSPECIFIED FEVER CAUSE: ICD-10-CM

## 2022-03-17 DIAGNOSIS — R05.9 COUGH: ICD-10-CM

## 2022-03-17 DIAGNOSIS — I10 ESSENTIAL HYPERTENSION: ICD-10-CM

## 2022-03-17 DIAGNOSIS — R59.0 CERVICAL LYMPHADENOPATHY: ICD-10-CM

## 2022-03-17 DIAGNOSIS — J02.9 PHARYNGITIS, UNSPECIFIED ETIOLOGY: ICD-10-CM

## 2022-03-17 LAB
ALBUMIN SERPL BCP-MCNC: 3.8 G/DL (ref 3.5–5)
ALP SERPL-CCNC: 123 U/L (ref 46–116)
ALT SERPL W P-5'-P-CCNC: 28 U/L (ref 12–78)
ANION GAP SERPL CALCULATED.3IONS-SCNC: 9 MMOL/L (ref 4–13)
AST SERPL W P-5'-P-CCNC: 15 U/L (ref 5–45)
ATRIAL RATE: 73 BPM
BILIRUB SERPL-MCNC: 0.88 MG/DL (ref 0.2–1)
BILIRUB UR QL STRIP: NEGATIVE
BUN SERPL-MCNC: 13 MG/DL (ref 5–25)
CALCIUM SERPL-MCNC: 9.3 MG/DL (ref 8.3–10.1)
CHLORIDE SERPL-SCNC: 103 MMOL/L (ref 100–108)
CLARITY UR: CLEAR
CO2 SERPL-SCNC: 27 MMOL/L (ref 21–32)
COLOR UR: YELLOW
CREAT SERPL-MCNC: 1.01 MG/DL (ref 0.6–1.3)
ERYTHROCYTE [DISTWIDTH] IN BLOOD BY AUTOMATED COUNT: 14.4 % (ref 11.6–15.1)
GFR SERPL CREATININE-BSD FRML MDRD: 74 ML/MIN/1.73SQ M
GLUCOSE P FAST SERPL-MCNC: 97 MG/DL (ref 65–99)
GLUCOSE UR STRIP-MCNC: NEGATIVE MG/DL
HCT VFR BLD AUTO: 52 % (ref 36.5–49.3)
HGB BLD-MCNC: 17.6 G/DL (ref 12–17)
HGB UR QL STRIP.AUTO: NEGATIVE
KETONES UR STRIP-MCNC: NEGATIVE MG/DL
LEUKOCYTE ESTERASE UR QL STRIP: NEGATIVE
LYMPHOCYTES NFR BLD: 17 % (ref 14–44)
MCH RBC QN AUTO: 29.3 PG (ref 26.8–34.3)
MCHC RBC AUTO-ENTMCNC: 33.8 G/DL (ref 31.4–37.4)
MCV RBC AUTO: 87 FL (ref 82–98)
MONOCYTES NFR BLD AUTO: 17 % (ref 4–12)
NEUTS SEG NFR BLD AUTO: 66 % (ref 45–77)
NITRITE UR QL STRIP: NEGATIVE
NRBC BLD AUTO-RTO: 0 /100 WBCS
P AXIS: 45 DEGREES
PH UR STRIP.AUTO: 7 [PH]
PLATELET # BLD AUTO: 163 THOUSANDS/UL (ref 149–390)
PLATELET BLD QL SMEAR: ADEQUATE
PMV BLD AUTO: 11.3 FL (ref 8.9–12.7)
POTASSIUM SERPL-SCNC: 4.6 MMOL/L (ref 3.5–5.3)
PR INTERVAL: 148 MS
PROT SERPL-MCNC: 8.1 G/DL (ref 6.4–8.2)
PROT UR STRIP-MCNC: NEGATIVE MG/DL
QRS AXIS: 7 DEGREES
QRSD INTERVAL: 128 MS
QT INTERVAL: 410 MS
QTC INTERVAL: 451 MS
RBC # BLD AUTO: 6 MILLION/UL (ref 3.88–5.62)
SODIUM SERPL-SCNC: 139 MMOL/L (ref 136–145)
SP GR UR STRIP.AUTO: 1.02 (ref 1–1.03)
T WAVE AXIS: 23 DEGREES
TOTAL CELLS COUNTED SPEC: 100
UROBILINOGEN UR QL STRIP.AUTO: 0.2 E.U./DL
VENTRICULAR RATE: 73 BPM
WBC # BLD AUTO: 11.21 THOUSAND/UL (ref 4.31–10.16)

## 2022-03-17 PROCEDURE — 86644 CMV ANTIBODY: CPT

## 2022-03-17 PROCEDURE — 87040 BLOOD CULTURE FOR BACTERIA: CPT

## 2022-03-17 PROCEDURE — 85007 BL SMEAR W/DIFF WBC COUNT: CPT

## 2022-03-17 PROCEDURE — 36415 COLL VENOUS BLD VENIPUNCTURE: CPT

## 2022-03-17 PROCEDURE — 85027 COMPLETE CBC AUTOMATED: CPT

## 2022-03-17 PROCEDURE — 71046 X-RAY EXAM CHEST 2 VIEWS: CPT

## 2022-03-17 PROCEDURE — 87086 URINE CULTURE/COLONY COUNT: CPT

## 2022-03-17 PROCEDURE — 80053 COMPREHEN METABOLIC PANEL: CPT

## 2022-03-17 PROCEDURE — 93005 ELECTROCARDIOGRAM TRACING: CPT

## 2022-03-17 PROCEDURE — 86664 EPSTEIN-BARR NUCLEAR ANTIGEN: CPT

## 2022-03-17 PROCEDURE — 86618 LYME DISEASE ANTIBODY: CPT

## 2022-03-17 PROCEDURE — 86645 CMV ANTIBODY IGM: CPT

## 2022-03-17 PROCEDURE — 93010 ELECTROCARDIOGRAM REPORT: CPT | Performed by: INTERNAL MEDICINE

## 2022-03-17 PROCEDURE — 81003 URINALYSIS AUTO W/O SCOPE: CPT

## 2022-03-17 PROCEDURE — 86665 EPSTEIN-BARR CAPSID VCA: CPT

## 2022-03-17 PROCEDURE — 86663 EPSTEIN-BARR ANTIBODY: CPT

## 2022-03-18 DIAGNOSIS — J02.0 STREP PHARYNGITIS: Primary | ICD-10-CM

## 2022-03-18 LAB
B BURGDOR IGG+IGM SER-ACNC: 51
BACTERIA THROAT CULT: ABNORMAL
CMV IGG SERPL IA-ACNC: <0.6 U/ML (ref 0–0.59)
CMV IGM SERPL IA-ACNC: <30 AU/ML (ref 0–29.9)
EBV NA IGG SER IA-ACNC: >600 U/ML (ref 0–17.9)
EBV VCA IGG SER IA-ACNC: 54.2 U/ML (ref 0–17.9)
EBV VCA IGM SER IA-ACNC: <36 U/ML (ref 0–35.9)
INTERPRETATION: ABNORMAL

## 2022-03-18 RX ORDER — PENICILLIN V POTASSIUM 500 MG/1
500 TABLET ORAL 2 TIMES DAILY
Qty: 30 TABLET | Refills: 0 | Status: SHIPPED | OUTPATIENT
Start: 2022-03-18 | End: 2022-03-22 | Stop reason: CLARIF

## 2022-03-18 NOTE — PROGRESS NOTES
I spoke with patient's wife Haritha Strange  Updated her on throat culture results  Will be sending a script for penicillin to the pharmacy  Patient has a preop appointment on 03/22 at the office here will follow-up with him at that time

## 2022-03-19 LAB — BACTERIA UR CULT: NORMAL

## 2022-03-21 LAB
BACTERIA BLD CULT: ABNORMAL
GRAM STN SPEC: ABNORMAL

## 2022-03-22 ENCOUNTER — CONSULT (OUTPATIENT)
Dept: INTERNAL MEDICINE CLINIC | Facility: CLINIC | Age: 72
End: 2022-03-22
Payer: COMMERCIAL

## 2022-03-22 ENCOUNTER — APPOINTMENT (OUTPATIENT)
Dept: LAB | Facility: HOSPITAL | Age: 72
End: 2022-03-22
Payer: COMMERCIAL

## 2022-03-22 VITALS
RESPIRATION RATE: 20 BRPM | OXYGEN SATURATION: 98 % | HEART RATE: 80 BPM | DIASTOLIC BLOOD PRESSURE: 88 MMHG | TEMPERATURE: 98.9 F | WEIGHT: 215 LBS | HEIGHT: 68 IN | BODY MASS INDEX: 32.58 KG/M2 | SYSTOLIC BLOOD PRESSURE: 140 MMHG

## 2022-03-22 DIAGNOSIS — H90.A31 MIXED CONDUCTIVE AND SENSORINEURAL HEARING LOSS OF RIGHT EAR WITH RESTRICTED HEARING OF LEFT EAR: ICD-10-CM

## 2022-03-22 DIAGNOSIS — R05.9 COUGH: ICD-10-CM

## 2022-03-22 DIAGNOSIS — R50.9 FEVER, UNSPECIFIED FEVER CAUSE: ICD-10-CM

## 2022-03-22 DIAGNOSIS — J02.0 STREP PHARYNGITIS: ICD-10-CM

## 2022-03-22 DIAGNOSIS — Z01.818 PRE-OPERATIVE EXAMINATION: Primary | ICD-10-CM

## 2022-03-22 LAB — LACTATE SERPL-SCNC: 1 MMOL/L (ref 0.5–2)

## 2022-03-22 PROCEDURE — 1036F TOBACCO NON-USER: CPT | Performed by: INTERNAL MEDICINE

## 2022-03-22 PROCEDURE — 3725F SCREEN DEPRESSION PERFORMED: CPT | Performed by: INTERNAL MEDICINE

## 2022-03-22 PROCEDURE — 83605 ASSAY OF LACTIC ACID: CPT

## 2022-03-22 PROCEDURE — 36415 COLL VENOUS BLD VENIPUNCTURE: CPT

## 2022-03-22 PROCEDURE — 99215 OFFICE O/P EST HI 40 MIN: CPT | Performed by: INTERNAL MEDICINE

## 2022-03-22 PROCEDURE — 3008F BODY MASS INDEX DOCD: CPT | Performed by: INTERNAL MEDICINE

## 2022-03-22 PROCEDURE — 1160F RVW MEDS BY RX/DR IN RCRD: CPT | Performed by: INTERNAL MEDICINE

## 2022-03-22 RX ORDER — GUAIFENESIN AND CODEINE PHOSPHATE 100; 10 MG/5ML; MG/5ML
5 SOLUTION ORAL 3 TIMES DAILY PRN
Qty: 236 ML | Refills: 0 | Status: SHIPPED | OUTPATIENT
Start: 2022-03-22 | End: 2022-04-06

## 2022-03-22 RX ORDER — AMOXICILLIN AND CLAVULANATE POTASSIUM 875; 125 MG/1; MG/1
1 TABLET, FILM COATED ORAL EVERY 12 HOURS SCHEDULED
Qty: 14 TABLET | Refills: 0 | Status: SHIPPED | OUTPATIENT
Start: 2022-03-22 | End: 2022-03-29

## 2022-03-22 RX ORDER — PROMETHAZINE HYDROCHLORIDE AND CODEINE PHOSPHATE 6.25; 1 MG/5ML; MG/5ML
5 SYRUP ORAL EVERY 4 HOURS PRN
Qty: 240 ML | Refills: 0 | Status: SHIPPED | OUTPATIENT
Start: 2022-03-22 | End: 2022-03-22

## 2022-03-22 RX ORDER — PROMETHAZINE HYDROCHLORIDE AND CODEINE PHOSPHATE 6.25; 1 MG/5ML; MG/5ML
5 SYRUP ORAL EVERY 4 HOURS PRN
Qty: 240 ML | Refills: 0 | Status: SHIPPED | OUTPATIENT
Start: 2022-03-22 | End: 2022-03-22 | Stop reason: CLARIF

## 2022-03-22 RX ORDER — AZITHROMYCIN 250 MG/1
TABLET, FILM COATED ORAL
Qty: 6 TABLET | Refills: 0 | Status: SHIPPED | OUTPATIENT
Start: 2022-03-22 | End: 2022-03-27

## 2022-03-22 NOTE — PATIENT INSTRUCTIONS

## 2022-03-22 NOTE — PROGRESS NOTES
INTERNAL MEDICINE PRE-OPERATIVE EVALUATION  [facility] - [facility] OF [address] [place] [address]    NAME: Izaiah Calles  AGE: 70 y o  SEX: male  : 1950     DATE: 3/22/2022     Internal Medicine Pre-Operative Evaluation:     Chief Complaint: Pre-operative Evaluation     Surgery: RIGHT MIDDLE EAR EXPLORATION POSSIBLE STAPEDOTOMY POSSIBLE OSSICULOPLASTY WITH FACIAL NERVE MONITORING (Right Ear)       STAPEDECTOMY (Right Ear)       OSSICULOPLASTY (Right Ear)  Anticipated Date of Surgery: 2022  Referring Provider: No ref  provider found        History of Present Illness:     Izaiah Calles is a 70 y o  male who presents to the office today for a preoperative consultation at the request of surgeon, Dr Ronna Hollis  Patient has 30 year history of right side hearing loss  Was diagnosed with otosclerosis in the past  Has always been told that he has large tonsils as well  Recently he was seen in the office due to sore throat and cough  Throat culture was positive for 1+ growth of beta hemolytic strep NOT group A, C, or G  He was given antibiotics  Had 1 blood culture grow staph coagulase negative  Likely a contaminant  Otherwise he has been systemically well  Normal lactic acid  Pre-op EKG showed chronic RBBB  No change from EKG in the past     No CP, SOB, LE swelling, orthopnea, PND, fever, chills  Still doesn't feel like his cough is getting better  Lungs are clear  His wife was given an antibiotic and seems to be getting better quicker than him  Assessment of Chronic Conditions:   · HTN - generally well controlled on losartan  · Borderline HLD - not on statin  Tries to watch his diet    ·  Vitamin D deficiency - on supplementation    Assessment of Cardiac Risk:  · Denies unstable or severe angina or MI in the last 6 weeks or history of stent placement in the last year   · Denies decompensated heart failure (e g  New onset heart failure, NYHA functional class IV heart failure, or worsening existing heart failure)  · Denies significant arrhythmias such as high grade AV block, symptomatic ventricular arrhythmia, newly recognized ventricular tachycardia, supraventricular tachycardia with resting heart rate >100, or symptomatic bradycardia  · Denies severe heart valve disease including aortic stenosis or symptomatic mitral stenosis     Exercise Capacity:  · Able to walk 4 blocks without symptoms?: Yes  · Able to walk 2 flights without symptoms?: Yes    Prior Anesthesia Reactions: No     Personal history of venous thromboembolic disease? No    History of steroid use for >2 weeks within last year? No     Review of Systems:     Review of Systems   Constitutional: Negative  HENT: Positive for hearing loss, sore throat and tinnitus  Negative for congestion, postnasal drip and rhinorrhea  Respiratory: Positive for cough  Cardiovascular: Negative  Gastrointestinal: Negative  Neurological: Negative  Hematological: Negative  Psychiatric/Behavioral: Negative           Problem List:     Patient Active Problem List   Diagnosis    Essential hypertension    Vitamin D deficiency    Nocturnal hypoxemia    Right bundle branch block (RBBB)    Right knee pain    Gastroesophageal reflux disease without esophagitis    Impacted cerumen of left ear    Dizziness    Mixed conductive and sensorineural hearing loss of right ear with restricted hearing of left ear      Allergies:     No Known Allergies     Current Medications:       Current Outpatient Medications:     Cholecalciferol (VITAMIN D3) 5000 units CAPS, daily , Disp: , Rfl:     losartan (COZAAR) 50 mg tablet, Take 1 tablet (50 mg total) by mouth daily, Disp: 90 tablet, Rfl: 3    penicillin V potassium (VEETID) 500 mg tablet, Take 1 tablet (500 mg total) by mouth 2 (two) times a day for 10 days, Disp: 30 tablet, Rfl: 0    ergocalciferol (VITAMIN D2) 50,000 units, Take 1 capsule (50,000 Units total) by mouth once a week, Disp: 12 capsule, Rfl: 0     Past History:     Past Medical History:   Diagnosis Date    Arthritis of carpometacarpal (CMC) joint of thumb     Bundle branch block, right     Complex tear of lateral meniscus of right knee 5/15/2019    Added automatically from request for surgery [id number]    Dupuytren's contracture     Elevated PSA     Hypertension     JAY (obstructive sleep apnea)     Polycythemia     Thrombocytopenia (HCC)      Past Surgical History:   Procedure Laterality Date    APPENDECTOMY      EYE SURGERY      KNEE SURGERY Left     arthroscopy    [place] KNEE SCOPE,MED+LAT MENIS REPAIR Right 5/23/2019    Procedure: KNEE ARTHROSCOPIC MEDIAL AND LATERAL MENISCAL REPAIR; REMOVAL LOOSE BODY; CORTISONE INJECTION;  Surgeon: Ramona Gunn MD;  Location: [facility];  Service: Orthopedics    PROSTATE BIOPSY      prostate punch     Family History   Problem Relation Age of Onset    Cancer Father     No Known Problems Mother     Hypertension Family      Social History     Socioeconomic History    Marital status: /Civil Union     Spouse name: Not on file    Number of children: Not on file    Years of education: Not on file    Highest education level: Not on file   Occupational History    Not on file   Tobacco Use    Smoking status: Never Smoker    Smokeless tobacco: Never Used   Vaping Use    Vaping Use: Never used   Substance and Sexual Activity    Alcohol use: Yes     Comment: occasionally    Drug use: No    Sexual activity: Yes     Partners: Female   Other Topics Concern    Not on file   Social History Narrative    Active advance directive     Social Determinants of Health     Financial Resource Strain: Not on file   Food Insecurity: Not on file   Transportation Needs: Not on file   Physical Activity: Not on file   Stress: Not on file   Social Connections: Not on file   Intimate Partner Violence: Not on file   Housing Stability: Not on file      Physical Exam:      /88 (BP Location: Left arm, Patient Position: Sitting, Cuff Size: Standard)   Pulse 80   Temp 98 9 °F (37 2 °C) (Tympanic)   Resp 20   Ht 5' 8" (1 727 m)   Wt 97 5 kg (215 lb)   SpO2 98%   BMI 32 69 kg/m²     Physical Exam  Constitutional:       General: He is not in acute distress  Appearance: He is obese  He is not ill-appearing  HENT:      Right Ear: There is no impacted cerumen  Left Ear: There is no impacted cerumen  Nose: Nose normal  No congestion or rhinorrhea  Mouth/Throat:      Mouth: Mucous membranes are moist       Pharynx: No oropharyngeal exudate or posterior oropharyngeal erythema  Tonsils: 2+ on the right  2+ on the left  Cardiovascular:      Rate and Rhythm: Normal rate and regular rhythm  Heart sounds: No murmur heard  Pulmonary:      Effort: Pulmonary effort is normal  No respiratory distress  Breath sounds: No wheezing  Abdominal:      General: Bowel sounds are normal  There is no distension  Tenderness: There is no abdominal tenderness  Musculoskeletal:      Right lower leg: No edema  Left lower leg: No edema  Neurological:      Mental Status: He is alert  Data:     Pre-operative work-up    Laboratory Results: I have personally reviewed the pertinent laboratory results/reports     EKG: I have personally reviewed pertinent reports  Assessment:     1  Pre-operative examination  2  Strep pharyngitis  3  Cough  4  Mixed conductive and sensorineural hearing loss of right ear with restricted hearing of left ear       Plan:     70 y o  male with planned surgery: RIGHT MIDDLE EAR EXPLORATION POSSIBLE STAPEDOTOMY POSSIBLE OSSICULOPLASTY WITH FACIAL NERVE MONITORING (Right Ear); STAPEDECTOMY (Right Ear); OSSICULOPLASTY (Right Ear)    Cardiac Risk Estimation: per the Revised Cardiac Risk Index (Circ  [id number], 1999), the patient's risk factors for cardiac complications include none, putting him in: RCI RISK CLASS I (0 risk factors, risk of major cardiac compl  appr  0 5%)  1  Further preoperative workup as follows:   · None; no further preoperative work-up is required    2  Medication Management/Recommendations:   · Will treat him with alternative antibiotics  I expect him to be improved before time of surgery  He appears systemically well  Will also prescribe cough medicine with codeine  [place] PDMP was reviewed  3  Prophylaxis for cardiac events with perioperative beta-blockers: not indicated  4  Patient requires further consultation with: None    New Medications Ordered This Visit   Medications    amoxicillin-clavulanate (AUGMENTIN) 875-125 mg per tablet     Sig: Take 1 tablet by mouth every 12 (twelve) hours for 7 days     Dispense:  14 tablet     Refill:  0    azithromycin (ZITHROMAX) 250 mg tablet     Sig: Take 2 tablets on day 1 and 1 tablet days 2-5     Dispense:  6 tablet     Refill:  0    guaifenesin-codeine (GUAIFENESIN AC) 100-10 MG/5ML liquid     Sig: Take 5 mL by mouth 3 (three) times a day as needed for cough     Dispense:  236 mL     Refill:  0     PDMP Review       Value Time User    PDMP Reviewed  Yes 3/22/2022  4:37 PM Palmer Reeves DO         Clearance  Patient is CLEARED for surgery without any additional cardiac testing       Ryann Alberts DO  [facility]  [facility] [facility] [address]  Phone#  [phone]  Fax#  [phone]

## 2022-04-06 NOTE — PRE-PROCEDURE INSTRUCTIONS
Pre-Surgery Instructions:   Medication Instructions    Cholecalciferol (VITAMIN D3) 5000 units CAPS pt instructed to not take on day of surgery     ergocalciferol (VITAMIN D2) 50,000 units pt instructed to not take on day of surgery     losartan (COZAAR) 50 mg tablet pt takes in the evening     Pt instructed to stop nsaids and supplements prior to surgery  Pt verbalized understanding of shower and med instructions  Pt denies fever, sob, sore throat and cough

## 2022-04-11 ENCOUNTER — HOSPITAL ENCOUNTER (OUTPATIENT)
Facility: HOSPITAL | Age: 72
Setting detail: OUTPATIENT SURGERY
Discharge: HOME | End: 2022-04-11
Attending: OTOLARYNGOLOGY | Admitting: OTOLARYNGOLOGY
Payer: COMMERCIAL

## 2022-04-11 ENCOUNTER — ANESTHESIA EVENT (OUTPATIENT)
Dept: PERIOP | Facility: HOSPITAL | Age: 72
End: 2022-04-11
Payer: COMMERCIAL

## 2022-04-11 ENCOUNTER — ANESTHESIA (OUTPATIENT)
Dept: PERIOP | Facility: HOSPITAL | Age: 72
End: 2022-04-11
Payer: COMMERCIAL

## 2022-04-11 VITALS
BODY MASS INDEX: 31.84 KG/M2 | OXYGEN SATURATION: 93 % | HEART RATE: 103 BPM | SYSTOLIC BLOOD PRESSURE: 141 MMHG | HEIGHT: 69 IN | TEMPERATURE: 97 F | DIASTOLIC BLOOD PRESSURE: 72 MMHG | RESPIRATION RATE: 16 BRPM | WEIGHT: 215 LBS

## 2022-04-11 DIAGNOSIS — R11.2 PONV (POSTOPERATIVE NAUSEA AND VOMITING): Primary | ICD-10-CM

## 2022-04-11 DIAGNOSIS — Z98.890 POSTOPERATIVE STATE: ICD-10-CM

## 2022-04-11 DIAGNOSIS — Z98.890 PONV (POSTOPERATIVE NAUSEA AND VOMITING): Primary | ICD-10-CM

## 2022-04-11 PROCEDURE — 69661 REVISE MIDDLE EAR BONE: CPT | Performed by: OTOLARYNGOLOGY

## 2022-04-11 PROCEDURE — L8613 OSSICULAR IMPLANT: HCPCS | Performed by: OTOLARYNGOLOGY

## 2022-04-11 PROCEDURE — C1765 ADHESION BARRIER: HCPCS | Performed by: OTOLARYNGOLOGY

## 2022-04-11 DEVICE — ECLIPSE PISTON 0.6MM DIA X 4.75MM L NITINOL/FLUOROPLASTIC
Type: IMPLANTABLE DEVICE | Site: EAR | Status: FUNCTIONAL
Brand: ECLIPSE PISTON

## 2022-04-11 RX ORDER — DEXAMETHASONE SODIUM PHOSPHATE 10 MG/ML
INJECTION, SOLUTION INTRAMUSCULAR; INTRAVENOUS AS NEEDED
Status: DISCONTINUED | OUTPATIENT
Start: 2022-04-11 | End: 2022-04-11

## 2022-04-11 RX ORDER — OFLOXACIN 3 MG/ML
SOLUTION/ DROPS OPHTHALMIC AS NEEDED
Status: DISCONTINUED | OUTPATIENT
Start: 2022-04-11 | End: 2022-04-11 | Stop reason: HOSPADM

## 2022-04-11 RX ORDER — ONDANSETRON 4 MG/1
4 TABLET, FILM COATED ORAL EVERY 8 HOURS PRN
Qty: 20 TABLET | Refills: 0 | Status: SHIPPED | OUTPATIENT
Start: 2022-04-11

## 2022-04-11 RX ORDER — KETOROLAC TROMETHAMINE 30 MG/ML
INJECTION, SOLUTION INTRAMUSCULAR; INTRAVENOUS AS NEEDED
Status: DISCONTINUED | OUTPATIENT
Start: 2022-04-11 | End: 2022-04-11

## 2022-04-11 RX ORDER — ONDANSETRON 2 MG/ML
4 INJECTION INTRAMUSCULAR; INTRAVENOUS EVERY 6 HOURS PRN
Status: DISCONTINUED | OUTPATIENT
Start: 2022-04-11 | End: 2022-04-11 | Stop reason: HOSPADM

## 2022-04-11 RX ORDER — CEFAZOLIN SODIUM 1 G/3ML
INJECTION, POWDER, FOR SOLUTION INTRAMUSCULAR; INTRAVENOUS AS NEEDED
Status: DISCONTINUED | OUTPATIENT
Start: 2022-04-11 | End: 2022-04-11

## 2022-04-11 RX ORDER — ONDANSETRON 2 MG/ML
4 INJECTION INTRAMUSCULAR; INTRAVENOUS ONCE AS NEEDED
Status: DISCONTINUED | OUTPATIENT
Start: 2022-04-11 | End: 2022-04-11 | Stop reason: HOSPADM

## 2022-04-11 RX ORDER — METHYLPREDNISOLONE 4 MG/1
TABLET ORAL
Qty: 1 EACH | Refills: 0 | Status: SHIPPED | OUTPATIENT
Start: 2022-04-11

## 2022-04-11 RX ORDER — FENTANYL CITRATE/PF 50 MCG/ML
25 SYRINGE (ML) INJECTION
Status: COMPLETED | OUTPATIENT
Start: 2022-04-11 | End: 2022-04-11

## 2022-04-11 RX ORDER — SODIUM CHLORIDE, SODIUM LACTATE, POTASSIUM CHLORIDE, CALCIUM CHLORIDE 600; 310; 30; 20 MG/100ML; MG/100ML; MG/100ML; MG/100ML
INJECTION, SOLUTION INTRAVENOUS CONTINUOUS PRN
Status: DISCONTINUED | OUTPATIENT
Start: 2022-04-11 | End: 2022-04-11

## 2022-04-11 RX ORDER — FENTANYL CITRATE 50 UG/ML
INJECTION, SOLUTION INTRAMUSCULAR; INTRAVENOUS AS NEEDED
Status: DISCONTINUED | OUTPATIENT
Start: 2022-04-11 | End: 2022-04-11

## 2022-04-11 RX ORDER — HYDROMORPHONE HCL/PF 1 MG/ML
0.5 SYRINGE (ML) INJECTION
Status: DISCONTINUED | OUTPATIENT
Start: 2022-04-11 | End: 2022-04-11 | Stop reason: HOSPADM

## 2022-04-11 RX ORDER — AMOXICILLIN 500 MG/1
500 CAPSULE ORAL 2 TIMES DAILY
Qty: 10 CAPSULE | Refills: 0 | Status: SHIPPED | OUTPATIENT
Start: 2022-04-11 | End: 2022-04-16

## 2022-04-11 RX ORDER — CEFAZOLIN SODIUM 2 G/50ML
2000 SOLUTION INTRAVENOUS EVERY 8 HOURS
Status: DISCONTINUED | OUTPATIENT
Start: 2022-04-11 | End: 2022-04-11 | Stop reason: HOSPADM

## 2022-04-11 RX ORDER — EPHEDRINE SULFATE 50 MG/ML
INJECTION INTRAVENOUS AS NEEDED
Status: DISCONTINUED | OUTPATIENT
Start: 2022-04-11 | End: 2022-04-11

## 2022-04-11 RX ORDER — ONDANSETRON 2 MG/ML
INJECTION INTRAMUSCULAR; INTRAVENOUS AS NEEDED
Status: DISCONTINUED | OUTPATIENT
Start: 2022-04-11 | End: 2022-04-11

## 2022-04-11 RX ORDER — EPINEPHRINE 0.1 MG/ML
SYRINGE (ML) INJECTION AS NEEDED
Status: DISCONTINUED | OUTPATIENT
Start: 2022-04-11 | End: 2022-04-11 | Stop reason: HOSPADM

## 2022-04-11 RX ORDER — MIDAZOLAM HYDROCHLORIDE 2 MG/2ML
INJECTION, SOLUTION INTRAMUSCULAR; INTRAVENOUS AS NEEDED
Status: DISCONTINUED | OUTPATIENT
Start: 2022-04-11 | End: 2022-04-11

## 2022-04-11 RX ORDER — SUCCINYLCHOLINE/SOD CL,ISO/PF 100 MG/5ML
SYRINGE (ML) INTRAVENOUS AS NEEDED
Status: DISCONTINUED | OUTPATIENT
Start: 2022-04-11 | End: 2022-04-11

## 2022-04-11 RX ORDER — IBUPROFEN 400 MG/1
200 TABLET ORAL EVERY 6 HOURS PRN
Status: DISCONTINUED | OUTPATIENT
Start: 2022-04-11 | End: 2022-04-11 | Stop reason: HOSPADM

## 2022-04-11 RX ORDER — LIDOCAINE HYDROCHLORIDE 10 MG/ML
INJECTION, SOLUTION EPIDURAL; INFILTRATION; INTRACAUDAL; PERINEURAL AS NEEDED
Status: DISCONTINUED | OUTPATIENT
Start: 2022-04-11 | End: 2022-04-11

## 2022-04-11 RX ORDER — PROPOFOL 10 MG/ML
INJECTION, EMULSION INTRAVENOUS AS NEEDED
Status: DISCONTINUED | OUTPATIENT
Start: 2022-04-11 | End: 2022-04-11

## 2022-04-11 RX ORDER — ACETAMINOPHEN 325 MG/1
650 TABLET ORAL EVERY 6 HOURS PRN
Status: DISCONTINUED | OUTPATIENT
Start: 2022-04-11 | End: 2022-04-11 | Stop reason: HOSPADM

## 2022-04-11 RX ADMIN — PROPOFOL 200 MG: 10 INJECTION, EMULSION INTRAVENOUS at 08:05

## 2022-04-11 RX ADMIN — SODIUM CHLORIDE, SODIUM LACTATE, POTASSIUM CHLORIDE, AND CALCIUM CHLORIDE: .6; .31; .03; .02 INJECTION, SOLUTION INTRAVENOUS at 09:45

## 2022-04-11 RX ADMIN — EPHEDRINE SULFATE 5 MG: 50 INJECTION INTRAVENOUS at 09:05

## 2022-04-11 RX ADMIN — REMIFENTANIL HYDROCHLORIDE 0.1 MCG/KG/MIN: 1 INJECTION, POWDER, LYOPHILIZED, FOR SOLUTION INTRAVENOUS at 08:26

## 2022-04-11 RX ADMIN — MIDAZOLAM 2 MG: 1 INJECTION INTRAMUSCULAR; INTRAVENOUS at 07:58

## 2022-04-11 RX ADMIN — FENTANYL CITRATE 100 MCG: 50 INJECTION INTRAMUSCULAR; INTRAVENOUS at 08:05

## 2022-04-11 RX ADMIN — LIDOCAINE HYDROCHLORIDE 100 MG: 10 INJECTION, SOLUTION EPIDURAL; INFILTRATION; INTRACAUDAL; PERINEURAL at 08:05

## 2022-04-11 RX ADMIN — Medication 25 MCG: at 10:36

## 2022-04-11 RX ADMIN — Medication 25 MCG: at 10:30

## 2022-04-11 RX ADMIN — Medication 100 MG: at 08:05

## 2022-04-11 RX ADMIN — CEFAZOLIN 2000 MG: 1 INJECTION, POWDER, FOR SOLUTION INTRAMUSCULAR; INTRAVENOUS at 08:19

## 2022-04-11 RX ADMIN — EPHEDRINE SULFATE 5 MG: 50 INJECTION INTRAVENOUS at 08:38

## 2022-04-11 RX ADMIN — REMIFENTANIL HYDROCHLORIDE 0.2 MCG/KG/MIN: 1 INJECTION, POWDER, LYOPHILIZED, FOR SOLUTION INTRAVENOUS at 08:13

## 2022-04-11 RX ADMIN — SODIUM CHLORIDE, SODIUM LACTATE, POTASSIUM CHLORIDE, AND CALCIUM CHLORIDE: .6; .31; .03; .02 INJECTION, SOLUTION INTRAVENOUS at 07:57

## 2022-04-11 RX ADMIN — ONDANSETRON 4 MG: 2 INJECTION INTRAMUSCULAR; INTRAVENOUS at 09:37

## 2022-04-11 RX ADMIN — KETOROLAC TROMETHAMINE 30 MG: 30 INJECTION, SOLUTION INTRAMUSCULAR at 09:54

## 2022-04-11 RX ADMIN — EPHEDRINE SULFATE 10 MG: 50 INJECTION INTRAVENOUS at 09:28

## 2022-04-11 RX ADMIN — DEXAMETHASONE SODIUM PHOSPHATE 10 MG: 10 INJECTION, SOLUTION INTRAMUSCULAR; INTRAVENOUS at 08:05

## 2022-04-11 RX ADMIN — EPHEDRINE SULFATE 5 MG: 50 INJECTION INTRAVENOUS at 09:14

## 2022-04-11 NOTE — H&P
Madelyn Duffy is a 70 y o  who presents with a chief complaint of       Chief Complaint   Patient presents with    Hearing Loss         Referred by Dr Santa Barnes and Marci Swan     Pertinent elements of the history include:  70year old man with longstanding (>25 year) history of right sided hearing loss  He states that he was diagnosed with otosclerosis in the past  He denies any history of recurrent acute otitis media, otalgia, otorrhea, tinnitus  He has not had otologic surgery in the past, and has not used a hearing aid for his hearing loss  He does not currently have vertigo or imbalance  He was diagnosed with otosclerosis many years ago, but did not pursue treatment at that time  However, his hearing has been worsening and he noted when he had a foreign body in his left ear that his right hearing was very poor, so he wants to pursue treatment now      Review of systems Review of systems reviewed, as documented on a separate form  All other systems reviewed, are negative  Dr Catarino Silva has reviewed these with the patient      The past medical, surgical, social and family history have been reviewed as documented in today's record       has a past medical history of Arthritis of carpometacarpal [place]) joint of thumb, Bundle branch block, right, Complex tear of lateral meniscus of right knee (5/15/2019), Dupuytren's contracture, Elevated PSA, Hypertension, JAY (obstructive sleep apnea), Polycythemia, and Thrombocytopenia ([address] )       Surgical History         Past Surgical History:   Procedure Laterality Date    APPENDECTOMY        EYE SURGERY        KNEE SURGERY Left       arthroscopy    [place] KNEE SCOPE,MED+LAT MENIS REPAIR Right 5/23/2019     Procedure: KNEE ARTHROSCOPIC MEDIAL AND LATERAL MENISCAL REPAIR; REMOVAL LOOSE BODY; CORTISONE INJECTION;  Surgeon: Cam Souza MD;  Location: [facility];  Service: Orthopedics    PROSTATE BIOPSY         prostate punch                  Family History   Problem Relation Age of Onset    Cancer Father      No Known Problems Mother      Hypertension Family           Social History               Socioeconomic History    Marital status: /Civil Union       Spouse name: Not on file    Number of children: Not on file    Years of education: Not on file    Highest education level: Not on file   Occupational History    Not on file   Tobacco Use    Smoking status: Never Smoker    Smokeless tobacco: Never Used   Vaping Use    Vaping Use: Never used   Substance and Sexual Activity    Alcohol use:  Yes       Comment: occasionally    Drug use: No    Sexual activity: Yes       Partners: Female   Other Topics Concern    Not on file   Social History Narrative     Active advance directive      Social Determinants of Health      Financial Resource Strain: Not on file   Food Insecurity: Not on file   Transportation Needs: Not on file   Physical Activity: Not on file   Stress: Not on file   Social Connections: Not on file   Intimate Partner Violence: Not on file   Housing Stability: Not on file                   Current Outpatient Medications on File Prior to Visit   Medication Sig Dispense Refill    Ascorbic Acid (vitamin C) 1000 MG tablet Take 1,000 mg by mouth daily        Cholecalciferol (VITAMIN D3) 5000 units CAPS daily         ergocalciferol (VITAMIN D2) 50,000 units Take 1 capsule (50,000 Units total) by mouth once a week 12 capsule 0    losartan (COZAAR) 50 mg tablet Take 1 tablet (50 mg total) by mouth daily 90 tablet 3      No current facility-administered medications on file prior to visit          Physical exam:   Temp (!) 97 2 °F (36 2 °C)   Ht 5' 8" (1 727 m)   Wt 98 9 kg (218 lb)   BMI 33 15 kg/m²      Constitutional:  Well developed, well nourished and groomed, in no acute distress       Eyes:  Extra-ocular movements intact, pupils equally round and reactive to light and accommodation, the lids and conjunctivae are normal in appearance      Head: Atraumatic, normocephalic, no visible scalp lesions, bony palpation unremarkable without stepoffs, parotid and submandibular salivary glands non-tender to palpation and without masses bilaterally      Ears:    Right ear: Auricle normal in appearance, mastoid prominence non-tender, external auditory canal clear and tympanic membrane intact      Left ear: Auricle normal in appearance, mastoid prominence non-tender, external auditory canal clear and tympanic membrane intact      Tuning forks:     Mckenna 512 Hz cannot hear  Rinne 512 Hz:              right bone > air              left air > bone  CNVII I              Right: I/VI              Left: I/VI     Nose: anterior rhinoscopy shows a patent nasal airway without masses, lesions or polyps     Mouth/oral cavity: no masses and palate elevates symmetrically  TMJs nontender to palpation     Dentition: intact     Oropharynx:  Base of tongue soft and without masses, tonsils bilaterally unremarkable, soft palate mucosa unremarkable       Neck:  No visible or palpable cervical lesions or lymphadenopathy, thyroid gland is normal in size and symmetry and without masses, normal laryngeal elevation with swallowing      Respiratory:  Normal respiratory effort without evidence of retractions or use of accessory muscles      Integument:  Normal appearing without observed masses or lesions      Neurologic:  Cranial nerves II-XII intact bilaterally  No spontaneous or gaze evoked nystagmus  Gait steady      Psychiatric:  Alert and oriented to time, place and person, normal affect      Chest: clear to auscultation    Cardiac: regular rate and rhythm    Abdomen: nondistended soft nontender    Extremities: no CCE     Results reviewed; images from any scan have been personally reviewed:     Audiogram: 7/14/2021  Right ear: severe mixed hearing loss with carhardt notch SRT 90 dB WR 24%  Left ear: normal sloping to severe sensorineural hearing loss SRT 15 dB WR 84%  Tracings reviewed by Dr Jae Lam, who agrees with the impression as noted above      Tympanogram: 7/14/2021  Right ear: type A  Left ear: type A  Tracings reviewed by Dr Blair Alvarez, who agrees with the impression as noted above      IMAGING:     CT temporal bones without contrast 8/11/2021:  Right ear: normal mastoid, middle ear, thickened stapes footplate, normal inner ear, normal external auditory canal  Left ear: normal mastoid, middle ear, ossicular chain, inner ear, external auditory canal  Images reviewed by Dr Blair Alvarez, who agrees with the impression as noted above     Procedures  None     Assessment/Plan:      1  Mixed conductive and sensorineural hearing loss of right ear with restricted hearing of left ear     2  Dizziness           His audiogram and tympanogram results were discussed in detail with the patient  Management options for his hearing loss were also discussed in detail, including hearing aid evaluation and right middle ear exploration possible stapedotomy possible ossiculoplasty with facial nerve monitoring   He understands that the risks of the procedure include but are n t limited to hearing loss, dizziness, infection, facial nerve injury/paralysis, and need for further surgery and has agreed to proceed      Followup: 1 week after his surgery

## 2022-04-11 NOTE — DISCHARGE INSTRUCTIONS
Discharge to home    Remove dressing tomorrow morning  Keep ear dry  OK to wash hair in 3 days, keep ear dry  No lifting > 15 lb for 3 weeks  Continue home medications  Tylenol 650 mg PO Q6 hours as needed for pain  Ibuprofen 200 mg PO q4 hour as needed for pain  zofran 4 mg PO q6 hour as needed for nausea  Medrol dosepak take per package instructions  Amoxicillin 500 mg TID x 5 days  Diet as tolerated, ok to eat regular foods  Follow up in 1 week with Dr Astrid Dickey

## 2022-04-11 NOTE — ANESTHESIA POSTPROCEDURE EVALUATION
Post-Op Assessment Note    CV Status:  Stable  Pain Score: 0    Pain management: adequate     Mental Status:  Alert and awake   Hydration Status:  Euvolemic   PONV Controlled:  Controlled   Airway Patency:  Patent      Post Op Vitals Reviewed: Yes      Staff: CRNA         No complications documented      BP   166/88   Temp   97 8   Pulse  99   Resp   20   SpO2   96

## 2022-04-11 NOTE — OP NOTE
OPERATIVE REPORT  PATIENT NAME: Shelley Jack    :  1950  MRN: [id number]  Pt Location:  OR ROOM 06    SURGERY DATE: 2022    Surgeon(s) and Role:     * Franca Meza MD - Primary    Preop Diagnosis:  Mixed conductive and sensorineural hearing loss of right ear, unspecified hearing status on contralateral side [H90 71]    Post-Op Diagnosis Codes:     * Mixed conductive and sensorineural hearing loss of right ear, unspecified hearing status on contralateral side [H90 71]    Procedure(s) (LRB):  RIGHT STAPEDOTOMY WITH DRILL OUT AND EAR LOBULE FAT GRAFT WITH FACIAL NERVE MONITORING (Right)    Specimen(s):   * No specimens in log * none    Estimated Blood Loss:   Minimal    Drains:  * No LDAs found *    Anesthesia Type:   General    Operative Indications:  Mixed conductive and sensorineural hearing loss of right ear, unspecified hearing status on contralateral side [H90 71]  Right stapes foot plate fixation    Operative Findings:  Right stapes footplate fixation and thickening    Complications:   None    Procedure and Technique:  The patient was brought into the operating room and placed supine on the OR table  Following the induction of general anesthesia, an endotracheal tube was placed by the anesthesia staff  The bed was turned 180 degrees  A 0 5 cm incision was marked out on the posterior aspect of the right ear lobule and injected with 1% lidocaine, 1:100,000 epinephrine  A facial nerve monitor was placed on the right face was found to be functional  The patient was then prepped and draped in sterile fashion  The operating microscope was sterile draped, brought into the field, and used for the remainder of the procedure  The external auditory canal was inspected using the operating microscope  The following findings were noted: normal ear canal anatomy, intact tympanic membrane   The ear canal was injected in 4 quadrants with a 1:100,000 dilution of epinephrine in sterile injectable saline  Incisions were made for a tympanomeatal flap and the flap was elevated medially  The chorda tympani  was preserved  The scutum was curetted to allow for visualization of the stapes footplate and stapedial tendon  The ossicular chain was intact but immobile with light palpation of the malleus  The stapes capitulum was disarticulated from the incus  Following that, the malleus and incus were mobile to palpation  The stapes remained immobile  The stapes footplate was thickened  There was not a  dehiscence of the facial nerve  The marked postauricular incision was made through the skin on the posterior aspect of the lobule, and the soft tissues were dissected  A piece of fat was harvested, cut into small pieces and saved on the back table for later use  The incision was closed with interrupted sutures of 5-0 fast gut    Using the CO2 laser, the stapedial tendon was transected at the pyramidal process  The posterior alycia of the stapes was cut through using the CO2 laser  The stapes superstructure was downfractured  Using the measuring instrument, the length between the lateral aspect of the incus and the stapes footplate was measured at 4 6 mm, and so a 4 75 mm length 0 6 mm width [facility] stapes piston was selected and opened on the back table  Using the CO2 laser, a 0 7mm fenestrum was made through the stapes footplate  The prosthesis was then placed into the fenestrum with the crook of the prosthesis over the incus long process  The prosthesis was crimped using the CO2 laser  Following that, gentle palpation of the malleus yielded motion of the incus and prosthesis and a round window reflex  Pieces of the previously harvested fat were then placed around the piston as it entered the stapes fenestration  The tympanomeatal flap was put back into position   The incision line was covered with floxin-soaked gelfoam       He was then awoken from general anesthesia and taken to the PACU in stable condition  His postoperative facial function was I/VI on the House-Brackmann scale bilaterally  The patient tolerated this procedure well without complications       Dr Sergey Max was present throughout this procedure and performed all key portions     I was present for the entire procedure    Patient Disposition:  PACU  and extubated and stable      SIGNATURE: Lashawn Ernandez MD  DATE: April 11, 2022  TIME: 9:46 AM

## 2022-04-23 ENCOUNTER — TELEPHONE (OUTPATIENT)
Dept: INTERNAL MEDICINE CLINIC | Facility: CLINIC | Age: 72
End: 2022-04-23

## 2022-04-23 LAB — COLOGUARD RESULT REPORTABLE: NEGATIVE

## 2022-04-23 NOTE — TELEPHONE ENCOUNTER
----- Message from Juliette Leung DO sent at 4/23/2022 11:53 AM EDT -----  Please let the patient know their cologuard testing was negative

## 2022-09-12 DIAGNOSIS — I10 BENIGN ESSENTIAL HYPERTENSION: Chronic | ICD-10-CM

## 2022-09-12 RX ORDER — LOSARTAN POTASSIUM 50 MG/1
TABLET ORAL
Qty: 90 TABLET | Refills: 3 | Status: SHIPPED | OUTPATIENT
Start: 2022-09-12

## 2022-09-26 ENCOUNTER — OFFICE VISIT (OUTPATIENT)
Dept: INTERNAL MEDICINE CLINIC | Facility: CLINIC | Age: 72
End: 2022-09-26
Payer: COMMERCIAL

## 2022-09-26 VITALS
HEIGHT: 70 IN | TEMPERATURE: 97.3 F | WEIGHT: 215 LBS | OXYGEN SATURATION: 96 % | SYSTOLIC BLOOD PRESSURE: 130 MMHG | DIASTOLIC BLOOD PRESSURE: 84 MMHG | RESPIRATION RATE: 20 BRPM | HEART RATE: 90 BPM | BODY MASS INDEX: 30.78 KG/M2

## 2022-09-26 DIAGNOSIS — Z00.00 MEDICARE ANNUAL WELLNESS VISIT, SUBSEQUENT: ICD-10-CM

## 2022-09-26 DIAGNOSIS — R35.1 BPH ASSOCIATED WITH NOCTURIA: ICD-10-CM

## 2022-09-26 DIAGNOSIS — N40.1 BPH ASSOCIATED WITH NOCTURIA: ICD-10-CM

## 2022-09-26 DIAGNOSIS — I10 ESSENTIAL HYPERTENSION: Primary | ICD-10-CM

## 2022-09-26 DIAGNOSIS — S90.31XA HEMATOMA OF RIGHT FOOT: ICD-10-CM

## 2022-09-26 PROBLEM — G47.34 NOCTURNAL HYPOXEMIA: Status: RESOLVED | Noted: 2017-11-20 | Resolved: 2022-09-26

## 2022-09-26 PROBLEM — H61.22 IMPACTED CERUMEN OF LEFT EAR: Status: RESOLVED | Noted: 2021-07-14 | Resolved: 2022-09-26

## 2022-09-26 PROBLEM — K21.9 GASTROESOPHAGEAL REFLUX DISEASE WITHOUT ESOPHAGITIS: Status: RESOLVED | Noted: 2020-07-02 | Resolved: 2022-09-26

## 2022-09-26 PROBLEM — R42 DIZZINESS: Status: RESOLVED | Noted: 2021-07-14 | Resolved: 2022-09-26

## 2022-09-26 PROBLEM — M25.561 RIGHT KNEE PAIN: Status: RESOLVED | Noted: 2019-05-15 | Resolved: 2022-09-26

## 2022-09-26 PROCEDURE — 1101F PT FALLS ASSESS-DOCD LE1/YR: CPT | Performed by: INTERNAL MEDICINE

## 2022-09-26 PROCEDURE — 3725F SCREEN DEPRESSION PERFORMED: CPT | Performed by: INTERNAL MEDICINE

## 2022-09-26 PROCEDURE — G0439 PPPS, SUBSEQ VISIT: HCPCS | Performed by: INTERNAL MEDICINE

## 2022-09-26 PROCEDURE — 3288F FALL RISK ASSESSMENT DOCD: CPT | Performed by: INTERNAL MEDICINE

## 2022-09-26 PROCEDURE — 1170F FXNL STATUS ASSESSED: CPT | Performed by: INTERNAL MEDICINE

## 2022-09-26 PROCEDURE — 1125F AMNT PAIN NOTED PAIN PRSNT: CPT | Performed by: INTERNAL MEDICINE

## 2022-09-26 PROCEDURE — 99214 OFFICE O/P EST MOD 30 MIN: CPT | Performed by: INTERNAL MEDICINE

## 2022-09-26 PROCEDURE — G0444 DEPRESSION SCREEN ANNUAL: HCPCS | Performed by: INTERNAL MEDICINE

## 2022-09-26 PROCEDURE — 1160F RVW MEDS BY RX/DR IN RCRD: CPT | Performed by: INTERNAL MEDICINE

## 2022-09-26 RX ORDER — NIACINAMIDE 500 MG
TABLET ORAL
COMMUNITY
Start: 2022-04-01

## 2022-09-26 RX ORDER — TAMSULOSIN HYDROCHLORIDE 0.4 MG/1
0.4 CAPSULE ORAL
Qty: 90 CAPSULE | Refills: 3 | Status: SHIPPED | OUTPATIENT
Start: 2022-09-26

## 2022-09-26 NOTE — PROGRESS NOTES
Assessment and Plan:     1  Essential hypertension    Blood pressure ok in the office today  Watch salt in diet  Continue anti-HTN therapy  2  Hematoma of right foot    The lesion on his foot appears to be a hematoma  He does not remember injuring his foot  Monitor for now  Wear good footwear  Elevate foot  If not improving, follow-up with podiatry  3  BPH associated with nocturia    Start flomax  Discussed side effects of medication  New Medications Ordered This Visit   Medications    tamsulosin (FLOMAX) 0 4 mg     Sig: Take 1 capsule (0 4 mg total) by mouth daily with dinner     Dispense:  90 capsule     Refill:  3      4  Medicare annual wellness visit, subsequent    Orders Placed This Encounter   Procedures    CBC    Comprehensive metabolic panel    Lipid panel     BMI Counseling: Body mass index is 30 85 kg/m²  The BMI is above normal  Nutrition recommendations include limiting drinks that contain sugar, moderation in carbohydrate intake and increasing intake of lean protein  Rationale for BMI follow-up plan is due to patient being overweight or obese  Depression Screening and Follow-up Plan: Patient was screened for depression during today's encounter  They screened negative with a PHQ-2 score of 0  Preventive health issues were discussed with patient, and age appropriate screening tests were ordered as noted in patient's After Visit Summary  Personalized health advice and appropriate referrals for health education or preventive services given if needed, as noted in patient's After Visit Summary  History of Present Illness:     Patient presents for a Medicare Wellness Visit    Since patient had ENT surgery, he hasn't been able to taste  Has been a little frustrating for him  Has follow-up in November    Getting up to go the bathroom multiple times per night  So he isn't getting a restful night sleep which is bothersome to him  Doesn't feel like he is emptying all the way      Has something on bottom of his right foot  Noticed a week ago  Thought it might be a wart so he bought OTC wart remover  Seems to be getting bigger and now more painful where is hurts to walk  Patient Care Team:  Nica Lose, DO as PCP - General     Review of Systems:     Review of Systems   Constitutional: Negative for chills, fatigue and fever  HENT: Positive for hearing loss  Loss of taste   Respiratory: Negative  Cardiovascular: Negative  Gastrointestinal: Negative  Genitourinary:        Nocturia   Musculoskeletal: Positive for arthralgias          Problem List:     Patient Active Problem List   Diagnosis    Essential hypertension    Vitamin D deficiency    Right bundle branch block (RBBB)    Mixed conductive and sensorineural hearing loss of right ear with restricted hearing of left ear      Past Medical and Surgical History:     Past Medical History:   Diagnosis Date    Arthritis of carpometacarpal (CMC) joint of thumb     Bundle branch block, right     Complex tear of lateral meniscus of right knee 5/15/2019    Added automatically from request for surgery [id number]    Dupuytren's contracture     Elevated PSA     Hypertension     Nocturnal hypoxemia 11/20/2017    JAY (obstructive sleep apnea)     Polycythemia     Thrombocytopenia (HCC)      Past Surgical History:   Procedure Laterality Date    APPENDECTOMY      EYE SURGERY      lump removed from eyelid    KNEE SURGERY Left     arthroscopy    SC KNEE SCOPE,MED+LAT MENIS REPAIR Right 5/23/2019    Procedure: KNEE ARTHROSCOPIC MEDIAL AND LATERAL MENISCAL REPAIR; REMOVAL LOOSE BODY; CORTISONE INJECTION;  Surgeon: Lisa Horvath MD;  Location: [facility] OR;  Service: Orthopedics    PROSTATE BIOPSY      prostate punch      Family History:     Family History   Problem Relation Age of Onset    Cancer Father     No Known Problems Mother     Hypertension Family       Social History:     Social History     Socioeconomic History    Marital status: /Civil Union     Spouse name: None    Number of children: None    Years of education: None    Highest education level: None   Occupational History    None   Tobacco Use    Smoking status: Never Smoker    Smokeless tobacco: Never Used   Vaping Use    Vaping Use: Never used   Substance and Sexual Activity    Alcohol use: Not Currently    Drug use: No    Sexual activity: Yes     Partners: Female   Other Topics Concern    None   Social History Narrative    Active advance directive     Social Determinants of Health     Financial Resource Strain: Low Risk     Difficulty of Paying Living Expenses: Not hard at all   Food Insecurity: Not on file   Transportation Needs: No Transportation Needs    Lack of Transportation (Medical): No    Lack of Transportation (Non-Medical): No   Physical Activity: Not on file   Stress: Not on file   Social Connections: Not on file   Intimate Partner Violence: Not on file   Housing Stability: Not on file      Medications and Allergies:     Current Outpatient Medications   Medication Sig Dispense Refill    Cholecalciferol (VITAMIN D3) 5000 units CAPS daily       losartan (COZAAR) 50 mg tablet TAKE 1 TABLET DAILY 90 tablet 3    niacinamide 500 mg tablet       tamsulosin (FLOMAX) 0 4 mg Take 1 capsule (0 4 mg total) by mouth daily with dinner 90 capsule 3     No current facility-administered medications for this visit       No Known Allergies   Immunizations:     Immunization History   Administered Date(s) Administered    COVID-19 PFIZER VACCINE 0 3 ML IM 02/25/2021, 03/20/2021, 10/13/2021    Pneumococcal Polysaccharide PPV23 07/02/2020      Health Maintenance:         Topic Date Due    Colorectal Cancer Screening  04/16/2025    Hepatitis C Screening  Completed         Topic Date Due    Pneumococcal Vaccine: 65+ Years (2 - PCV) 07/02/2021    COVID-19 Vaccine (4 - Booster for Pfizer series) 02/13/2022    Influenza Vaccine (1) 09/01/2022      Medicare Screening Tests and Risk Assessments:     Ольга Lao is here for his Subsequent Wellness visit  Last Medicare Wellness visit information reviewed, patient interviewed and updates made to the record today  Health Risk Assessment:   Patient rates overall health as good  Patient feels that their physical health rating is same  Patient is satisfied with their life  Eyesight was rated as same  Hearing was rated as same  Patient feels that their emotional and mental health rating is same  Patients states they are never, rarely angry  Patient states they are never, rarely unusually tired/fatigued  Pain experienced in the last 7 days has been some  Patient's pain rating has been 6/10  Patient states that he has experienced no weight loss or gain in last 6 months  Depression Screening:   PHQ-2 Score: 0      Fall Risk Screening: In the past year, patient has experienced: no history of falling in past year      Home Safety:  Patient does not have trouble with stairs inside or outside of their home  Patient has working smoke alarms and has working carbon monoxide detector  Home safety hazards include: none  Nutrition:   Current diet is Regular  Medications:   Patient is currently taking over-the-counter supplements  OTC medications include: see medication list  Patient is able to manage medications  Activities of Daily Living (ADLs)/Instrumental Activities of Daily Living (IADLs):   Walk and transfer into and out of bed and chair?: Yes  Dress and groom yourself?: Yes    Bathe or shower yourself?: Yes    Feed yourself?  Yes  Do your laundry/housekeeping?: Yes  Manage your money, pay your bills and track your expenses?: Yes  Make your own meals?: Yes    Do your own shopping?: Yes    Durable Medical Equipment Suppliers  none    Previous Hospitalizations:   Any hospitalizations or ED visits within the last 12 months?: No      Advance Care Planning:   Living will: Yes    Durable POA for healthcare: No    Advanced directive: Yes    Five wishes given: No      Cognitive Screening:   Provider or family/friend/caregiver concerned regarding cognition?: No    PREVENTIVE SCREENINGS      Cardiovascular Screening:    General: Screening Current      Diabetes Screening:     General: Screening Current      Colorectal Cancer Screening:     General: Screening Current      Prostate Cancer Screening:    General: Screening Not Indicated      Osteoporosis Screening:    General: Screening Not Indicated      Abdominal Aortic Aneurysm (AAA) Screening:    Risk factors include: age between 73-69 yo        General: Screening Not Indicated      Lung Cancer Screening:     General: Screening Not Indicated      Hepatitis C Screening:    General: Screening Current    Screening, Brief Intervention, and Referral to Treatment (SBIRT)    Screening  Typical number of drinks in a day: 1  Typical number of drinks in a week: 2  Interpretation: Low risk drinking behavior  AUDIT-C Screenin) How often did you have a drink containing alcohol in the past year? monthly or less  2) How many drinks did you have on a typical day when you were drinking in the past year? 1 to 2  3) How often did you have 6 or more drinks on one occasion in the past year? never    AUDIT-C Score: 1  Interpretation: Score 0-3 (male): Negative screen for alcohol misuse    Single Item Drug Screening:  How often have you used an illegal drug (including marijuana) or a prescription medication for non-medical reasons in the past year? never    Single Item Drug Screen Score: 0  Interpretation: Negative screen for possible drug use disorder    Brief Intervention  Alcohol & drug use screenings were reviewed  No concerns regarding substance use disorder identified  Other Counseling Topics:   Car/seat belt/driving safety, skin self-exam, sunscreen and regular weightbearing exercise        Physical Exam:     /84 (BP Location: Left arm, Patient Position: Sitting, Cuff Size: Standard) Pulse 90   Temp (!) 97 3 °F (36 3 °C) (Tympanic)   Resp 20   Ht 5' 10" (1 778 m)   Wt 97 5 kg (215 lb)   SpO2 96%   BMI 30 85 kg/m²     Physical Exam  Constitutional:       General: He is not in acute distress  Appearance: He is not ill-appearing  Cardiovascular:      Rate and Rhythm: Normal rate and regular rhythm  Heart sounds: No murmur heard  Pulmonary:      Effort: Pulmonary effort is normal  No respiratory distress  Breath sounds: No wheezing  Abdominal:      General: Bowel sounds are normal  There is no distension  Tenderness: There is no abdominal tenderness  Musculoskeletal:      Right lower leg: No edema  Left lower leg: No edema  Skin:     Comments: Round 1 cm lesion bottom of right foot  Black in color   Neurological:      Mental Status: He is alert            Deveron Marking, DO

## 2022-09-26 NOTE — PATIENT INSTRUCTIONS
Chronic Hypertension   AMBULATORY CARE:   Hypertension  is high blood pressure  Your blood pressure is the force of your blood moving against the walls of your arteries  Hypertension causes your blood pressure to get so high that your heart has to work much harder than normal  This can damage your heart  Even if you have hypertension for years, lifestyle changes, medicines, or both can help bring your blood pressure to normal   Call your local emergency number (911 in the [address] [facility]) or have someone call if:   You have chest pain  You have any of the following signs of a heart attack:      Squeezing, pressure, or pain in your chest    You may  also have any of the following:     Discomfort or pain in your back, neck, jaw, stomach, or arm    Shortness of breath    Nausea or vomiting    Lightheadedness or a sudden cold sweat    You become confused or have difficulty speaking  You suddenly feel lightheaded or have trouble breathing  Seek care immediately if:   You have a severe headache or vision loss  You have weakness in an arm or leg  Call your doctor or cardiologist if:   You feel faint, dizzy, confused, or drowsy  You have been taking your blood pressure medicine but your pressure is higher than your provider says it should be  You have questions or concerns about your condition or care  Treatment for chronic hypertension  may include medicine to lower your blood pressure and cholesterol levels  A low cholesterol level helps prevent heart disease and makes it easier to control your blood pressure  Heart disease can make your blood pressure harder to control  You may also need to make lifestyle changes  What you need to know about the stages of hypertension:       Normal blood pressure is 119/79 or lower   Your healthcare provider may only check your blood pressure each year if it stays at a normal level  Elevated blood pressure is 120/79 to 129/79   This is sometimes called prehypertension  Your healthcare provider may suggest lifestyle changes to help lower your blood pressure to a normal level  He or she may then check it again in 3 to 6 months  Stage 1 hypertension is 130/80  to 139/89   Your provider may recommend lifestyle changes, medication, and checks every 3 to 6 months until your blood pressure is controlled  Stage 2 hypertension is 140/90 or higher   Your provider will recommend lifestyle changes and have you take 2 kinds of hypertension medicines  You will also need to have your blood pressure checked monthly until it is controlled  Manage chronic hypertension:   Check your blood pressure at home  Avoid smoking, caffeine, and exercise at least 30 minutes before checking your blood pressure  Sit and rest for 5 minutes before you take your blood pressure  Extend your arm and support it on a flat surface  Your arm should be at the same level as your heart  Follow the directions that came with your blood pressure monitor  Check your blood pressure 2 times, 1 minute apart, before you take your medicine in the morning  Also check your blood pressure before your evening meal  Keep a record of your readings and bring it to your follow-up visits  Ask your healthcare provider what your blood pressure should be  Manage any other health conditions you have  Health conditions such as diabetes can increase your risk for hypertension  Follow your healthcare provider's instructions and take all your medicines as directed  Talk to your healthcare provider about any new health conditions you have recently developed  Ask about all medicines  Certain medicines can increase your blood pressure  Examples include oral birth control pills, decongestants, herbal supplements, and NSAIDs, such as ibuprofen  Your healthcare provider can tell you which medicines are safe for you to take  This includes prescription and over-the-counter medicines      Lifestyle changes you can make to lower your blood pressure: Your provider may want you to make more lifestyle changes if you are having trouble controlling your blood pressure  This may feel difficult over time, especially if you think you are making good changes but your pressure is still high  It might help to focus on one new change at a time  For example, try to add 1 more day of exercise, or exercise for an extra 10 minutes on 2 days  Small changes can make a big difference  Your healthcare provider can also refer you to specialists such as a dietitian who can help you make small changes  Your family members may be included in helping you learn to create lifestyle changes, such as the following:     Limit sodium (salt) as directed  Too much sodium can affect your fluid balance  Check labels to find low-sodium or no-salt-added foods  Some low-sodium foods use potassium salts for flavor  Too much potassium can also cause health problems  Your healthcare provider will tell you how much sodium and potassium are safe for you to have in a day  He or she may recommend that you limit sodium to 2,300 mg a day  Follow the meal plan recommended by your healthcare provider  A dietitian or your provider can give you more information on low-sodium plans or the DASH (Dietary Approaches to Stop Hypertension) eating plan  The DASH plan is low in sodium, processed sugar, unhealthy fats, and total fat  It is high in potassium, calcium, and fiber  These can be found in vegetables, fruit, and whole-grain foods  Be physically active throughout the day  Physical activity, such as exercise, can help control your blood pressure and your weight  Be physically active for at least 30 minutes per day, on most days of the week  Include aerobic activity, such as walking or riding a bicycle  Also include strength training at least 2 times each week  Your healthcare providers can help you create a physical activity plan  Decrease stress    This may help lower your blood pressure  Learn ways to relax, such as deep breathing or listening to music  Limit alcohol as directed  Alcohol can increase your blood pressure  A drink of alcohol is 12 ounces of beer, 5 ounces of wine, or 1½ ounces of liquor  Do not smoke  Nicotine and other chemicals in cigarettes and cigars can increase your blood pressure and also cause lung damage  Ask your healthcare provider for information if you currently smoke and need help to quit  E-cigarettes or smokeless tobacco still contain nicotine  Talk to your healthcare provider before you use these products  Follow up with your doctor or cardiologist as directed: You will need to return to have your blood pressure checked and to have other lab tests done  Write down your questions so you remember to ask them during your visits  © Copyright [employer] 2022 Information is for End User's use only and may not be sold, redistributed or otherwise used for commercial purposes  All illustrations and images included in CareNotes® are the copyrighted property of A [employer] M [employer] Inc  or [facility] [employer]   The above information is an  only  It is not intended as medical advice for individual conditions or treatments  Talk to your doctor, nurse or pharmacist before following any medical regimen to see if it is safe and effective for you

## 2022-11-13 PROBLEM — Z90.09 HISTORY OF STAPEDECTOMY: Status: ACTIVE | Noted: 2022-11-13

## 2022-11-29 ENCOUNTER — OFFICE VISIT (OUTPATIENT)
Dept: INTERNAL MEDICINE CLINIC | Facility: CLINIC | Age: 72
End: 2022-11-29

## 2022-11-29 VITALS
TEMPERATURE: 97 F | HEART RATE: 78 BPM | WEIGHT: 219 LBS | BODY MASS INDEX: 31.35 KG/M2 | OXYGEN SATURATION: 96 % | DIASTOLIC BLOOD PRESSURE: 74 MMHG | SYSTOLIC BLOOD PRESSURE: 128 MMHG | HEIGHT: 70 IN | RESPIRATION RATE: 20 BRPM

## 2022-11-29 DIAGNOSIS — R05.1 ACUTE COUGH: Primary | ICD-10-CM

## 2022-11-29 RX ORDER — PREDNISONE 20 MG/1
40 TABLET ORAL DAILY
Qty: 10 TABLET | Refills: 0 | Status: SHIPPED | OUTPATIENT
Start: 2022-11-29 | End: 2022-12-04

## 2022-11-29 RX ORDER — BENZONATATE 100 MG/1
100 CAPSULE ORAL 3 TIMES DAILY PRN
Qty: 60 CAPSULE | Refills: 1 | Status: SHIPPED | OUTPATIENT
Start: 2022-11-29

## 2022-11-29 NOTE — PROGRESS NOTES
Name: Gayle Mcdonald      : 1950      MRN: [id number]  Encounter Provider: Mariaelena Mathur DO  Encounter Date: 2022   Encounter department: [facility]  [address]     1  Acute cough    All consistent with viral illness and likely just needs some more time  Can trial a course of prednisone, but discussed with him that he may not see any discernable benefit from it  Prescription cough medicine sent  Hopefully slow improvement over the next 1-2 weeks  - predniSONE 20 mg tablet; Take 2 tablets (40 mg total) by mouth daily for 5 days  Dispense: 10 tablet; Refill: 0  - benzonatate (TESSALON PERLES) 100 mg capsule; Take 1 capsule (100 mg total) by mouth 3 (three) times a day as needed for cough  Dispense: 60 capsule; Refill: 1       Depression Screening and Follow-up Plan: Patient was screened for depression during today's encounter  They screened negative with a PHQ-2 score of 0  Return if symptoms worsen or fail to improve  Subjective      Cold symptoms x 2 5 weeks  Nagging, annoying cough at this point  Sometimes he gets mucus out of his chest  Sometimes getting wheezing at night  No fever or chills  Wife has same thing  Given antibiotics early in the month and didn't do much  Review of Systems   Constitutional: Negative for chills, fatigue and fever  Respiratory: Positive for cough and wheezing  Negative for chest tightness and shortness of breath  Cardiovascular: Negative  Neurological: Negative  Objective     /74 (BP Location: Left arm, Patient Position: Sitting, Cuff Size: Large)   Pulse 78   Temp (!) 97 °F (36 1 °C) (Tympanic)   Resp 20   Ht 5' 10" (1 778 m)   Wt 99 3 kg (219 lb)   SpO2 96%   BMI 31 42 kg/m²     Physical Exam  Constitutional:       General: He is not in acute distress  Appearance: He is not ill-appearing     HENT:      Right Ear: Tympanic membrane, ear canal and external ear normal  There is no impacted cerumen  Left Ear: Tympanic membrane, ear canal and external ear normal  There is no impacted cerumen  Cardiovascular:      Rate and Rhythm: Normal rate and regular rhythm  Heart sounds: No murmur heard  Pulmonary:      Effort: Pulmonary effort is normal  No respiratory distress  Breath sounds: Decreased air movement present  No wheezing  Abdominal:      General: Bowel sounds are normal  There is no distension  Tenderness: There is no abdominal tenderness  Neurological:      Mental Status: He is alert         Bin Thurman, DO

## 2023-03-28 ENCOUNTER — OFFICE VISIT (OUTPATIENT)
Dept: INTERNAL MEDICINE CLINIC | Facility: CLINIC | Age: 73
End: 2023-03-28

## 2023-03-28 VITALS
OXYGEN SATURATION: 98 % | HEART RATE: 76 BPM | BODY MASS INDEX: 30.64 KG/M2 | RESPIRATION RATE: 20 BRPM | HEIGHT: 70 IN | SYSTOLIC BLOOD PRESSURE: 120 MMHG | WEIGHT: 214 LBS | TEMPERATURE: 97.4 F | DIASTOLIC BLOOD PRESSURE: 80 MMHG

## 2023-03-28 DIAGNOSIS — Z23 ENCOUNTER FOR IMMUNIZATION: ICD-10-CM

## 2023-03-28 DIAGNOSIS — T75.3XXA SEA SICKNESS, INITIAL ENCOUNTER: ICD-10-CM

## 2023-03-28 DIAGNOSIS — Z12.5 SCREENING FOR PROSTATE CANCER: ICD-10-CM

## 2023-03-28 DIAGNOSIS — I10 ESSENTIAL HYPERTENSION: Primary | ICD-10-CM

## 2023-03-28 PROBLEM — N40.1 BPH ASSOCIATED WITH NOCTURIA: Status: ACTIVE | Noted: 2023-03-28

## 2023-03-28 PROBLEM — R35.1 BPH ASSOCIATED WITH NOCTURIA: Status: ACTIVE | Noted: 2023-03-28

## 2023-03-28 RX ORDER — PROMETHAZINE HYDROCHLORIDE 25 MG/1
TABLET ORAL
Qty: 30 TABLET | Refills: 0 | Status: SHIPPED | OUTPATIENT
Start: 2023-03-28 | End: 2023-03-28 | Stop reason: ALTCHOICE

## 2023-03-28 NOTE — PROGRESS NOTES
"Name: Jorge Ruggiero      : 1950      MRN: [id number]  Encounter Provider: Lizzy Ruiz DO  Encounter Date: 3/28/2023   Enc[facility]   [address]     1  Essential hypertension    BP well controlled  Continue losartan  Check updated labs  - CBC; Future  - Comprehensive metabolic panel; Future  - Lipid Panel with Direct LDL reflex; Future    2  Screening for prostate cancer  - PSA, Total Screen; Future    3  Sea sickness, initial encounter    Discussed using phenergan if needed for sea sickness  Hasn't done well with scopolamine in the past     - promethazine (PHENERGAN) 25 mg tablet; 25 mg orally twice daily starting one-half to 1 hour prior to travel; may repeat 8 to 12 hours later if needed  On succeeding travel days, administer 25 mg on arising and before the evening meal  Dispense: 30 tablet; Refill: 0    4  Encounter for immunization  - Pneumococcal Conjugate Vaccine 20-valent (PCV20)        Return in about 6 months (around 2023) for Follow-up  Deng Delgado presents for follow up  He is doing well  Forgot to get labs done  No big changes with his health  Denies cardiac symptoms  Going out on a boat with a friend in the coming months and wants to have some medication in case of sea sickness  Review of Systems   Constitutional: Negative  Respiratory: Negative  Cardiovascular: Negative  Gastrointestinal: Negative  Objective     /80 (BP Location: Left arm, Patient Position: Sitting, Cuff Size: Standard)   Pulse 76   Temp (!) 97 4 °F (36 3 °C) (Tympanic)   Resp 20   Ht 5' 10\" (1 778 m)   Wt 97 1 kg (214 lb)   SpO2 98%   BMI 30 71 kg/m²     Physical Exam  Constitutional:       General: He is not in acute distress  Appearance: He is well-developed  He is not diaphoretic  Neck:      Thyroid: No thyromegaly  Vascular: No JVD     Cardiovascular:      Rate and Rhythm: Normal rate and regular " rhythm  Heart sounds: Normal heart sounds  No murmur heard  Pulmonary:      Effort: Pulmonary effort is normal  No respiratory distress  Breath sounds: Normal breath sounds  No wheezing or rales  Abdominal:      General: Bowel sounds are normal  There is no distension  Palpations: Abdomen is soft  There is no mass  Tenderness: There is no abdominal tenderness  There is no guarding or rebound  Musculoskeletal:      Right lower leg: No edema  Left lower leg: No edema  Neurological:      Mental Status: He is alert         Teressa Fox DO

## 2023-03-28 NOTE — PATIENT INSTRUCTIONS
Chronic Hypertension   AMBULATORY CARE:   Hypertension is considered chronic  when it continues for 3 months or longer  Hypertension that continues causes your heart to work much harder than normal, which may lead to heart damage  Even if you have hypertension for years, lifestyle changes, medicines, or both may help lower your blood pressure  Call your local emergency number [phone] in the [facility]) or have someone call if:   You have chest pain  You have any of the following signs of a heart attack:      Squeezing, pressure, or pain in your chest    You may  also have any of the following:     Discomfort or pain in your back, neck, jaw, stomach, or arm    Shortness of breath    Nausea or vomiting    Lightheadedness or a sudden cold sweat    You become confused or have difficulty speaking  You suddenly feel lightheaded or have trouble breathing  Seek care immediately if:   You have a severe headache or vision loss  You have weakness in an arm or leg  Call your doctor or cardiologist if:   You feel faint, dizzy, confused, or drowsy  You have been taking your blood pressure medicine but your pressure is higher than your provider says it should be  You have questions or concerns about your condition or care  Treatment for chronic hypertension  may include medicine to lower your blood pressure and cholesterol levels  A low cholesterol level helps prevent heart disease and makes it easier to control your blood pressure  Heart disease can make your blood pressure harder to control  You may also need to make lifestyle changes  What you need to know about the stages of hypertension:  Your healthcare provider will give you a blood pressure goal based on your age, health, and risk for cardiovascular disease  The following are general guidelines on the stages of hypertension:  Normal blood pressure is 119/79 or lower    Your provider may only check your blood pressure each year if it stays at a normal level     Elevated blood pressure is 120/79 to 129/79   This is sometimes called prehypertension  Your provider may suggest lifestyle changes to help lower your blood pressure to a normal level  He or she may then check it again in 3 to 6 months  Stage 1 hypertension is 130/80  to 139/89   Your provider may recommend lifestyle changes, medication, and checks every 3 to 6 months until your blood pressure is controlled  Stage 2 hypertension is 140/90 or higher   Your provider will recommend lifestyle changes and have you take 2 kinds of hypertension medicines  You will also need to have your blood pressure checked monthly until it is controlled  Manage chronic hypertension:   Check your blood pressure at home  Do not smoke, have caffeine, or exercise for at least 30 minutes before you check your blood pressure  Sit and rest for 5 minutes before you check your blood pressure  Extend your arm and support it on a flat surface  Your arm should be at the same level as your heart  Follow the directions that came with your blood pressure monitor  Check your blood pressure 2 times, 1 minute apart, before you take your medicine in the morning  Also check your blood pressure before your evening meal  Keep a record of your readings and bring it to your follow-up visits  Your healthcare provider may use the readings to make changes to your treatment plan  Manage any other health conditions you have  Health conditions such as diabetes can increase your risk for hypertension  Follow your provider's instructions and take all your medicines as directed  Talk to your provider about any new health conditions you have recently developed  Ask about all medicines  Certain medicines can increase your blood pressure  Examples include oral birth control pills, decongestants, herbal supplements, and NSAIDs, such as ibuprofen  Your provider can tell you which medicines are safe for you to take   This includes prescription and over-the-counter medicines  Lifestyle changes you can make to lower your blood pressure: Your provider may want you to make more lifestyle changes if you are having trouble controlling your blood pressure  This may feel difficult over time, especially if you think you are making good changes but your pressure is still high  It might help to focus on one new change at a time  For example, try to add 1 more day of exercise, or exercise for an extra 10 minutes on 2 days  Small changes can make a big difference  Your healthcare provider can also refer you to specialists such as a dietitian who can help you make small changes  Your family members may be included in helping you learn to create lifestyle changes, such as the following:     Limit sodium (salt) as directed  Too much sodium can affect your fluid balance  Check labels to find low-sodium or no-salt-added foods  Some low-sodium foods use potassium salts for flavor  Too much potassium can also cause health problems  Your provider will tell you how much sodium and potassium are safe for you to have in a day  He or she may recommend that you limit sodium to 2,300 mg a day  Follow the meal plan recommended by your provider  A dietitian or your provider can give you more information on low-sodium plans or the DASH (Dietary Approaches to Stop Hypertension) eating plan  The DASH plan is low in sodium, processed sugar, unhealthy fats, and total fat  It is high in potassium, calcium, and fiber  These can be found in vegetables, fruit, and whole-grain foods  Be physically active throughout the day  Physical activity, such as exercise, can help control your blood pressure and your weight  Be physically active for at least 30 minutes per day, on most days of the week  Include aerobic activity, such as walking or riding a bicycle  Also include strength training at least 2 times each week   Your provider can help you create a physical activity plan  Decrease stress  This may help lower your blood pressure  Learn ways to relax, such as deep breathing or listening to music  Limit alcohol as directed  Alcohol can increase your blood pressure  A drink of alcohol is 12 ounces of beer, 5 ounces of wine, or 1½ ounces of liquor  Your provider can help you set daily and weekly drink limits  He or she may recommend no alcohol if your blood pressure stays higher than goal even with medicine or other measures  Ask your provider for information if you need help to quit  Do not smoke  Nicotine and other chemicals in cigarettes and cigars can increase your blood pressure and also cause lung damage  Ask your provider for information if you currently smoke and need help to quit  E-cigarettes or smokeless tobacco still contain nicotine  Talk to your provider before you use these products  Follow up with your doctor or cardiologist as directed: You will need to return to have your blood pressure checked and to have other lab tests done  Write down your questions so you remember to ask them during your visits  © Copyright Edmond Hernandez 2022 Information is for End User's use only and may not be sold, redistributed or otherwise used for commercial purposes  The above information is an  only  It is not intended as medical advice for individual conditions or treatments  Talk to your doctor, nurse or pharmacist before following any medical regimen to see if it is safe and effective for you

## 2023-07-03 ENCOUNTER — APPOINTMENT (EMERGENCY)
Dept: CT IMAGING | Facility: HOSPITAL | Age: 73
End: 2023-07-03
Payer: COMMERCIAL

## 2023-07-03 ENCOUNTER — HOSPITAL ENCOUNTER (EMERGENCY)
Facility: HOSPITAL | Age: 73
Discharge: HOME | End: 2023-07-03
Attending: EMERGENCY MEDICINE
Payer: COMMERCIAL

## 2023-07-03 VITALS
RESPIRATION RATE: 16 BRPM | OXYGEN SATURATION: 95 % | TEMPERATURE: 98.3 F | HEART RATE: 80 BPM | DIASTOLIC BLOOD PRESSURE: 69 MMHG | SYSTOLIC BLOOD PRESSURE: 136 MMHG

## 2023-07-03 DIAGNOSIS — K40.90 LEFT INGUINAL HERNIA: Primary | ICD-10-CM

## 2023-07-03 DIAGNOSIS — L03.116 CELLULITIS OF LEFT THIGH: ICD-10-CM

## 2023-07-03 LAB
ANION GAP SERPL CALCULATED.3IONS-SCNC: 6 MMOL/L
APTT PPP: 29 SECONDS (ref 23–37)
BASOPHILS # BLD AUTO: 0.03 THOUSANDS/ÂΜL (ref 0–0.1)
BASOPHILS NFR BLD AUTO: 0 % (ref 0–1)
BUN SERPL-MCNC: 13 MG/DL (ref 5–25)
CALCIUM SERPL-MCNC: 8.9 MG/DL (ref 8.4–10.2)
CHLORIDE SERPL-SCNC: 104 MMOL/L (ref 96–108)
CO2 SERPL-SCNC: 26 MMOL/L (ref 21–32)
CREAT SERPL-MCNC: 1.06 MG/DL (ref 0.6–1.3)
EOSINOPHIL # BLD AUTO: 0.03 THOUSAND/ÂΜL (ref 0–0.61)
EOSINOPHIL NFR BLD AUTO: 0 % (ref 0–6)
ERYTHROCYTE [DISTWIDTH] IN BLOOD BY AUTOMATED COUNT: 14.4 % (ref 11.6–15.1)
GFR SERPL CREATININE-BSD FRML MDRD: 69 ML/MIN/1.73SQ M
GLUCOSE SERPL-MCNC: 88 MG/DL (ref 65–140)
HCT VFR BLD AUTO: 51 % (ref 36.5–49.3)
HGB BLD-MCNC: 16.9 G/DL (ref 12–17)
IMM GRANULOCYTES # BLD AUTO: 0.02 THOUSAND/UL (ref 0–0.2)
IMM GRANULOCYTES NFR BLD AUTO: 0 % (ref 0–2)
INR PPP: 0.98 (ref 0.84–1.19)
LACTATE SERPL-SCNC: 1.1 MMOL/L (ref 0.5–2)
LIPASE SERPL-CCNC: 34 U/L (ref 11–82)
LYMPHOCYTES # BLD AUTO: 1.42 THOUSANDS/ÂΜL (ref 0.6–4.47)
LYMPHOCYTES NFR BLD AUTO: 17 % (ref 14–44)
MCH RBC QN AUTO: 29.3 PG (ref 26.8–34.3)
MCHC RBC AUTO-ENTMCNC: 33.1 G/DL (ref 31.4–37.4)
MCV RBC AUTO: 89 FL (ref 82–98)
MONOCYTES # BLD AUTO: 0.92 THOUSAND/ÂΜL (ref 0.17–1.22)
MONOCYTES NFR BLD AUTO: 11 % (ref 4–12)
NEUTROPHILS # BLD AUTO: 5.74 THOUSANDS/ÂΜL (ref 1.85–7.62)
NEUTS SEG NFR BLD AUTO: 72 % (ref 43–75)
NRBC BLD AUTO-RTO: 0 /100 WBCS
PLATELET # BLD AUTO: 114 THOUSANDS/UL (ref 149–390)
PMV BLD AUTO: 11.2 FL (ref 8.9–12.7)
POTASSIUM SERPL-SCNC: 4.4 MMOL/L (ref 3.5–5.3)
PROCALCITONIN SERPL-MCNC: 0.17 NG/ML
PROTHROMBIN TIME: 12.8 SECONDS (ref 11.6–14.5)
RBC # BLD AUTO: 5.76 MILLION/UL (ref 3.88–5.62)
SODIUM SERPL-SCNC: 136 MMOL/L (ref 135–147)
WBC # BLD AUTO: 8.16 THOUSAND/UL (ref 4.31–10.16)

## 2023-07-03 PROCEDURE — 87077 CULTURE AEROBIC IDENTIFY: CPT | Performed by: EMERGENCY MEDICINE

## 2023-07-03 PROCEDURE — 83690 ASSAY OF LIPASE: CPT | Performed by: EMERGENCY MEDICINE

## 2023-07-03 PROCEDURE — 85610 PROTHROMBIN TIME: CPT | Performed by: EMERGENCY MEDICINE

## 2023-07-03 PROCEDURE — 87154 CUL TYP ID BLD PTHGN 6+ TRGT: CPT | Performed by: EMERGENCY MEDICINE

## 2023-07-03 PROCEDURE — 36415 COLL VENOUS BLD VENIPUNCTURE: CPT | Performed by: EMERGENCY MEDICINE

## 2023-07-03 PROCEDURE — 74177 CT ABD & PELVIS W/CONTRAST: CPT

## 2023-07-03 PROCEDURE — 80048 BASIC METABOLIC PNL TOTAL CA: CPT | Performed by: EMERGENCY MEDICINE

## 2023-07-03 PROCEDURE — 85730 THROMBOPLASTIN TIME PARTIAL: CPT | Performed by: EMERGENCY MEDICINE

## 2023-07-03 PROCEDURE — 84145 PROCALCITONIN (PCT): CPT | Performed by: EMERGENCY MEDICINE

## 2023-07-03 PROCEDURE — 99284 EMERGENCY DEPT VISIT MOD MDM: CPT

## 2023-07-03 PROCEDURE — G1004 CDSM NDSC: HCPCS

## 2023-07-03 PROCEDURE — 85025 COMPLETE CBC W/AUTO DIFF WBC: CPT | Performed by: EMERGENCY MEDICINE

## 2023-07-03 PROCEDURE — 87040 BLOOD CULTURE FOR BACTERIA: CPT | Performed by: EMERGENCY MEDICINE

## 2023-07-03 PROCEDURE — 83605 ASSAY OF LACTIC ACID: CPT | Performed by: EMERGENCY MEDICINE

## 2023-07-03 RX ORDER — CEPHALEXIN 250 MG/1
500 CAPSULE ORAL EVERY 6 HOURS SCHEDULED
Qty: 56 CAPSULE | Refills: 0 | Status: SHIPPED | OUTPATIENT
Start: 2023-07-03 | End: 2023-07-10

## 2023-07-03 RX ORDER — CEPHALEXIN 250 MG/1
500 CAPSULE ORAL ONCE
Status: COMPLETED | OUTPATIENT
Start: 2023-07-03 | End: 2023-07-03

## 2023-07-03 RX ORDER — SODIUM CHLORIDE 9 MG/ML
3 INJECTION INTRAVENOUS EVERY 12 HOURS SCHEDULED
Status: DISCONTINUED | OUTPATIENT
Start: 2023-07-03 | End: 2023-07-03 | Stop reason: HOSPADM

## 2023-07-03 RX ADMIN — CEPHALEXIN 500 MG: 250 CAPSULE ORAL at 17:06

## 2023-07-03 RX ADMIN — IOHEXOL 100 ML: 350 INJECTION, SOLUTION INTRAVENOUS at 15:29

## 2023-07-03 NOTE — ED PROVIDER NOTES
Pt Name: Elia Wills  MRN: [id number]  [address] [place] 1950  Age/Sex: 67 y.o. male  Date of evaluation: 7/3/2023  PCP: Mckenna Okeefe    Chief Complaint   Patient presents with   • Hernia     Pt reports a left groin hernia that has increased in pain in the last couple days. HPI    67 y.o. male presenting with pain and swelling to left groin. Patient states that he has had a hernia in his left groin over the past several weeks, over the past few days it seems to have increased in size. The hernia itself is mildly painful but he notes more significant pain from the left inner thigh, he notes a red rash that has appeared over the past few days. He complains of burning pain to the area, mild to moderate, worse with walking or running on the rash and better at rest.  He denies fever, nausea, vomiting, trauma, chest pain, shortness of breath, changes in urine or bowel movements, other symptoms.       HPI      Past Medical and Surgical History    Past Medical History:   Diagnosis Date   • Arthritis of carpometacarpal [place]) joint of thumb    • Bundle branch block, right    • Complex tear of lateral meniscus of right knee 05/15/2019    Added automatically from request for surgery [id number]   • Dupuytren's contracture    • Elevated PSA    • GERD (gastroesophageal reflux disease) 2010   • Hypertension    • Nocturnal hypoxemia 11/20/2017   • JAY (obstructive sleep apnea)    • Polycythemia    • Thrombocytopenia (HCC)        Past Surgical History:   Procedure Laterality Date   • APPENDECTOMY     • EYE SURGERY      lump removed from eyelid   • KNEE SURGERY Left     arthroscopy   • MI ARTHROSCOPY KNEE W/MENISCUS RPR MEDIAL&LATERAL Right 5/23/2019    Procedure: KNEE ARTHROSCOPIC MEDIAL AND LATERAL MENISCAL REPAIR; REMOVAL LOOSE BODY; CORTISONE INJECTION;  Surgeon: Norlene Hodgkins, MD;  Location: [facility];  Service: Orthopedics   • PROSTATE BIOPSY      prostate punch       Family History Problem Relation Age of Onset   • Cancer Father         Lung cancer-smoker   • No Known Problems Mother    • Hypertension Family        Social History     Tobacco Use   • Smoking status: Never   • Smokeless tobacco: Never   Vaping Use   • Vaping Use: Never used   Substance Use Topics   • Alcohol use: Not Currently   • Drug use: No           Allergies    No Known Allergies    Home Medications    Prior to Admission medications    Medication Sig Start Date End Date Taking? Authorizing Provider   benzonatate (TESSALON PERLES) 100 mg capsule Take 1 capsule (100 mg total) by mouth 3 (three) times a day as needed for cough 11/29/22   Palmer Reeves DO   Cholecalciferol (VITAMIN D3) 5000 units CAPS daily     Historical Provider, MD   losartan (COZAAR) 50 mg tablet TAKE 1 TABLET DAILY 9/12/22   Palmer Reeves DO   scopolamine (TRANSDERM-SCOP) 1 mg/3 days TD 72 hr patch Place 1 patch on the skin over 72 hours every third day 3/28/23   Donn Reeves DO   tamsulosin (FLOMAX) 0.4 mg Take 1 capsule (0.4 mg total) by mouth daily with dinner 9/26/22   Sita Rodriguez DO           Review of Systems    Review of Systems   Constitutional: Negative for appetite change, chills and diaphoresis. HENT: Negative for drooling, facial swelling, trouble swallowing and voice change. Respiratory: Negative for apnea, shortness of breath and wheezing. Cardiovascular: Negative for chest pain and leg swelling. Gastrointestinal: Negative for abdominal distention, abdominal pain, diarrhea, nausea and vomiting. Genitourinary: Negative for dysuria and urgency. Musculoskeletal: Negative for arthralgias, back pain, gait problem and neck pain. Skin: Positive for rash. Negative for color change and wound. Neurological: Negative for seizures, speech difficulty, weakness and headaches. Psychiatric/Behavioral: Negative for agitation, behavioral problems and dysphoric mood. The patient is not nervous/anxious.             All other systems reviewed and negative. Physical Exam      ED Triage Vitals [07/03/23 1311]   Temperature Pulse Respirations Blood Pressure SpO2   98.3 °F (36.8 °C) 80 17 148/81 95 %      Temp Source Heart Rate Source Patient Position - Orthostatic VS BP Location FiO2 (%)   Tympanic Monitor Sitting Left arm --      Pain Score       --               Physical Exam  Vitals and nursing note reviewed. Constitutional:       General: He is not in acute distress. Appearance: He is well-developed. He is not ill-appearing, toxic-appearing or diaphoretic. HENT:      Head: Normocephalic and atraumatic. Right Ear: External ear normal.      Left Ear: External ear normal.      Nose: Nose normal. No congestion or rhinorrhea. Mouth/Throat:      Mouth: Mucous membranes are moist.      Pharynx: Oropharynx is clear. No oropharyngeal exudate or posterior oropharyngeal erythema. Eyes:      Conjunctiva/sclera: Conjunctivae normal.      Pupils: Pupils are equal, round, and reactive to light. Neck:      Trachea: No tracheal deviation. Cardiovascular:      Rate and Rhythm: Normal rate and regular rhythm. Pulses: Normal pulses. Heart sounds: Normal heart sounds. No murmur heard. Pulmonary:      Effort: Pulmonary effort is normal. No respiratory distress. Breath sounds: Normal breath sounds. No stridor. No wheezing or rales. Abdominal:      General: There is no distension. Palpations: Abdomen is soft. Tenderness: There is no abdominal tenderness. There is no guarding or rebound. Hernia: A hernia is present. Comments: Large left inguinal hernia noted, minimal tenderness to palpation, appears to contain fat and bowel on palpation. Testicles normal.   Musculoskeletal:         General: No deformity. Normal range of motion. Cervical back: Normal range of motion and neck supple. Right lower leg: No edema. Left lower leg: No edema. Skin:     General: Skin is warm and dry. Capillary Refill: Capillary refill takes less than 2 seconds. Findings: Rash present. Comments: Erythematous rash noted to the left inner thigh groin, tender to palpation, no fluctuance, no crepitus, no pain out of proportion. Neurological:      Mental Status: He is alert and oriented to person, place, and time. Psychiatric:         Behavior: Behavior normal.         Thought Content: Thought content normal.         Judgment: Judgment normal.              Diagnostic Results      Labs:    Results Reviewed     Procedure Component Value Units Date/Time    Blood culture #1 [[id number]] Collected: 07/03/23 1452    Lab Status: Preliminary result Specimen: Blood from Arm, Right Updated: 07/03/23 2001     Blood Culture Received in Microbiology Lab. Culture in Progress. Blood culture #2 [[id number]] Collected: 07/03/23 1506    Lab Status: Preliminary result Specimen: Blood from Arm, Left Updated: 07/03/23 2001     Blood Culture Received in Microbiology Lab. Culture in Progress. Lactic acid, plasma (w/reflex if result > 2.0) [[id number]]  (Normal) Collected: 07/03/23 1452    Lab Status: Final result Specimen: Blood from Arm, Right Updated: 07/03/23 1539     LACTIC ACID 1.1 mmol/L     Narrative:      Result may be elevated if tourniquet was used during collection.     Procalcitonin [[id number]]  (Normal) Collected: 07/03/23 1452    Lab Status: Final result Specimen: Blood from Arm, Right Updated: 07/03/23 1531     Procalcitonin 0.17 ng/ml     Lipase [[id number]]  (Normal) Collected: 07/03/23 1452    Lab Status: Final result Specimen: Blood from Arm, Right Updated: 07/03/23 1522     Lipase 34 u/L     Basic metabolic panel [[id number]] Collected: 07/03/23 1452    Lab Status: Final result Specimen: Blood from Arm, Right Updated: 07/03/23 1522     Sodium 136 mmol/L      Potassium 4.4 mmol/L      Chloride 104 mmol/L      CO2 26 mmol/L      ANION GAP 6 mmol/L      BUN 13 mg/dL      Creatinine 1.06 mg/dL      Glucose 88 mg/dL      Calcium 8.9 mg/dL      eGFR 69 ml/min/1.73sq m     Narrative:      [facility] guidelines for Chronic Kidney Disease (CKD):   •  Stage 1 with normal or high GFR (GFR > 90 mL/min/1.73 square meters)  •  Stage 2 Mild CKD (GFR = 60-89 mL/min/1.73 square meters)  •  Stage 3A Moderate CKD (GFR = 45-59 mL/min/1.73 square meters)  •  Stage 3B Moderate CKD (GFR = 30-44 mL/min/1.73 square meters)  •  Stage 4 Severe CKD (GFR = 15-29 mL/min/1.73 square meters)  •  Stage 5 End Stage CKD (GFR <15 mL/min/1.73 square meters)  Note: GFR calculation is accurate only with a steady state creatinine    CBC and differential [[id number]]  (Abnormal) Collected: 07/03/23 1452    Lab Status: Final result Specimen: Blood from Arm, Right Updated: 07/03/23 1519     WBC 8.16 Thousand/uL      RBC 5.76 Million/uL      Hemoglobin 16.9 g/dL      Hematocrit 51.0 %      MCV 89 fL      MCH 29.3 pg      MCHC 33.1 g/dL      RDW 14.4 %      MPV 11.2 fL      Platelets 237 Thousands/uL      nRBC 0 /100 WBCs      Neutrophils Relative 72 %      Immat GRANS % 0 %      Lymphocytes Relative 17 %      Monocytes Relative 11 %      Eosinophils Relative 0 %      Basophils Relative 0 %      Neutrophils Absolute 5.74 Thousands/µL      Immature Grans Absolute 0.02 Thousand/uL      Lymphocytes Absolute 1.42 Thousands/µL      Monocytes Absolute 0.92 Thousand/µL      Eosinophils Absolute 0.03 Thousand/µL      Basophils Absolute 0.03 Thousands/µL     Protime-INR [[id number]]  (Normal) Collected: 07/03/23 1452    Lab Status: Final result Specimen: Blood from Arm, Right Updated: 07/03/23 1518     Protime 12.8 seconds      INR 0.98    APTT [[id number]]  (Normal) Collected: 07/03/23 1452    Lab Status: Final result Specimen: Blood from Arm, Right Updated: 07/03/23 1518     PTT 29 seconds           All labs reviewed and utilized in the medical decision making process    Radiology:    CT abdomen pelvis with contrast   Final Result      1. Nondistended colon within the large left inguinal hernia without obstruction or associated inflammation. 2.  4 mm juxtapleural right lower lobe pulmonary nodule. Based on current Fleischner Society 2017 Guidelines on incidental pulmonary nodule, no routine follow-up is needed if the patient is low risk. If the patient is high risk, optional follow-up chest    CT at 12 months can be considered. Workstation performed: [id number]             All radiology studies independently viewed by me and interpreted by the radiologist.    Procedure    Procedures        ED Course of Care and Re-Assessments      After discussion of risks and benefits, hernia was reduced with gentle steady pressure as well as utilization of the Trendelenburg position. Unfortunately, patient set up and that hernia immediately recurred. Work-up performed and returned overall reassuring as above, plan formed for close outpatient follow-up with general surgery. Started Keflex for treatment of cellulitis of the left thigh  Medications   iohexol (OMNIPAQUE) 350 MG/ML injection (SINGLE-DOSE) 100 mL (100 mL Intravenous Given 7/3/23 1529)   cephalexin (KEFLEX) capsule 500 mg (500 mg Oral Given 7/3/23 1706)           FINAL IMPRESSION    Final diagnoses:   Left inguinal hernia   Cellulitis of left thigh         DISPOSITION/PLAN    Presentation as above with left inguinal hernia without evidence of incarceration or strangulation, easily reducible emergency department although  it recurred immediately after reduction. Rash to the left thigh is almost consistent with cellulitis, suspect secondary to bacterial translocation after trauma to the skin due to chafing between the bulge of the hernia in the thigh. Low suspicion for abscess at this time. Do not suspect sepsis, incarcerated or strangulated hernia, bowel obstruction, Dunia's gangrene, necrotizing soft tissue infection, other acute life threat.   Treated symptomatically, referred to general surgery, started on Keflex for cellulitis, discharged with strict return precautions, follow-up with primary care doctor. Time reflects when diagnosis was documented in both MDM as applicable and the Disposition within this note     Time User Action Codes Description Comment    7/3/2023  4:54 PM Madison QUINTEROS Add [K40.90] Left inguinal hernia     7/3/2023  4:54 PM Jose Haskins Add [C20.625] Cellulitis of left thigh       ED Disposition     ED Disposition   Discharge    Condition   Stable    Date/Time   Mon Jul 3, 2023  4:54 PM    Comment   Levi Marshall discharge to home/self care.                Follow-up Information     Follow up With Specialties Details Why Contact Info Additional [facility] Emergency Department Emergency Medicine Go to  If symptoms worsen [address] [facility] [address] [facility] Emergency Department, [place], [place], [facility]  Internal Medicine Call in 2 days Schedule close follow-up for this visit [address] [place] [address] [address]       [address] [facility] Surgery Call in 2 days To schedule close follow-up for your inguinal hernia [address]  [place] [address] [address]  [address] [facility], [facility] [address], [place], [place], [address] [phone]            PATIENT REFERRED TO:    [facility] Emergency Department  [address] [address] [phone]  Go to   If symptoms worsen    Stephanie Grijalva DO  [address] [facility]  [phone]    Call in 2 days  Schedule close follow-up for this visit    [address]  [address] [facility] [address]  [address] [address] [phone]  Call in 2 days  To schedule close follow-up for your inguinal hernia      DISCHARGE MEDICATIONS:    Discharge Medication List as of 7/3/2023  4:55 PM      START taking these medications    Details   cephalexin (KEFLEX) 250 mg capsule Take 2 capsules (500 mg total) by mouth every 6 (six) hours for 7 days, Starting Mon 7/3/2023, Until Mon 7/10/2023, Print         CONTINUE these medications which have NOT CHANGED    Details   benzonatate (TESSALON PERLES) 100 mg capsule Take 1 capsule (100 mg total) by mouth 3 (three) times a day as needed for cough, Starting Tue 11/29/2022, Normal      Cholecalciferol (VITAMIN D3) 5000 units CAPS daily , Historical Med      losartan (COZAAR) 50 mg tablet TAKE 1 TABLET DAILY, Normal      scopolamine (TRANSDERM-SCOP) 1 mg/3 days TD 72 hr patch Place 1 patch on the skin over 72 hours every third day, Starting Tue 3/28/2023, Normal      tamsulosin (FLOMAX) 0.4 mg Take 1 capsule (0.4 mg total) by mouth daily with dinner, Starting Mon 9/26/2022, Normal                      Chaz Ray MD    Portions of the record may have been created with voice recognition software. Occasional wrong word or "sound alike" substitutions may have occurred due to the inherent limitations of voice recognition software.   Please read the chart carefully and recognize, using context, where substitutions have occurred     Chaz Ray MD  07/03/23 0790

## 2023-07-07 LAB
ALL TARGETS: NOT DETECTED
BACTERIA BLD CULT: ABNORMAL
BACTERIA BLD CULT: ABNORMAL
GRAM STN SPEC: ABNORMAL

## 2023-07-08 LAB — BACTERIA BLD CULT: NORMAL

## 2023-07-08 NOTE — RESULT ENCOUNTER NOTE
Discussed with infectious disease, Dr. Eugenia Mari, likely contaminant. Discussed results with patient, has a PCP follow up scheduled.

## 2023-07-31 ENCOUNTER — CONSULT (OUTPATIENT)
Dept: SURGERY | Facility: CLINIC | Age: 73
End: 2023-07-31
Payer: COMMERCIAL

## 2023-07-31 VITALS
OXYGEN SATURATION: 96 % | BODY MASS INDEX: 30.72 KG/M2 | RESPIRATION RATE: 18 BRPM | TEMPERATURE: 97.8 F | DIASTOLIC BLOOD PRESSURE: 80 MMHG | HEART RATE: 65 BPM | SYSTOLIC BLOOD PRESSURE: 128 MMHG | WEIGHT: 214.6 LBS | HEIGHT: 70 IN

## 2023-07-31 DIAGNOSIS — K40.90 LEFT INGUINAL HERNIA: Primary | ICD-10-CM

## 2023-07-31 PROCEDURE — 99204 OFFICE O/P NEW MOD 45 MIN: CPT | Performed by: STUDENT IN AN ORGANIZED HEALTH CARE EDUCATION/TRAINING PROGRAM

## 2023-07-31 PROCEDURE — 3079F DIAST BP 80-89 MM HG: CPT | Performed by: STUDENT IN AN ORGANIZED HEALTH CARE EDUCATION/TRAINING PROGRAM

## 2023-07-31 PROCEDURE — 3074F SYST BP LT 130 MM HG: CPT | Performed by: STUDENT IN AN ORGANIZED HEALTH CARE EDUCATION/TRAINING PROGRAM

## 2023-07-31 RX ORDER — CHLORHEXIDINE GLUCONATE 4 G/100ML
SOLUTION TOPICAL DAILY PRN
OUTPATIENT
Start: 2023-07-31

## 2023-07-31 RX ORDER — HEPARIN SODIUM 5000 [USP'U]/ML
5000 INJECTION, SOLUTION INTRAVENOUS; SUBCUTANEOUS ONCE
OUTPATIENT
Start: 2023-07-31 | End: 2023-07-31

## 2023-07-31 NOTE — H&P (VIEW-ONLY)
Assessment/Plan:  69-year-old male with incarcerated left inguinal hernia  -Presents due to left groin bulge  -Noticed a large bulge in his left groin for a few months  -Does not cause him any significant discomfort  -Patient is tolerating a diet well and having bowel function  -On exam there is an incarcerated left scrotal inguinal hernia, no obvious inguinal hernia on the right  -Emergency department note from 7/3/2023 reviewed  -CT scan of the abdomen and pelvis from 7/3/2023 report and images reviewed, this was also reviewed with the patient  -CBC and BMP from 7/3/2023 reviewed  -Plan for open left inguinal hernia repair with mesh  -EKG ordered  -Request primary care physician risk stratification and optimization    A visual was used to display the anatomy and the proposed incision, procedure, steps, and mesh placement. The risks were explained at length and outlined, not limited to bleeding, infection, recurrence, pain, testicular loss, and damage to other structures. The planned approximately one hour procedure was discussed along with 1 - 2 week recovery, resolution of pain and swelling timeline, and 4 weeks of light duty without lifting. Questions were answered to satisfaction and consent was signed. 1. Left inguinal hernia  -     Ambulatory Referral to General Surgery  -     Case request operating room: REPAIR HERNIA INGUINAL - Open with Mesh; Standing  -     EKG 12 lead; Future  -     Case request operating room: REPAIR HERNIA INGUINAL - Open with Mesh               Subjective:      Patient ID: Finas Gitelman is a 68 y.o. male. Triage Notes:    Patient is a 69-year-old male who presents the office for evaluation due to left groin bulge. Patient has noticed a bulge in his left groin over the last few months. It has gotten progressively larger. He states it is normally stuck out. He denies any significant discomfort. He has been tolerating a diet and having normal bowel function.       The following portions of the patient's history were reviewed and updated as appropriate:   He  has a past medical history of Arthritis of carpometacarpal [place]) joint of thumb, Bundle branch block, right, Complex tear of lateral meniscus of right knee (05/15/2019), Dupuytren's contracture, Elevated PSA, GERD (gastroesophageal reflux disease) (2010), Hypertension, Nocturnal hypoxemia (11/20/2017), JAY (obstructive sleep apnea), Polycythemia, and Thrombocytopenia ([address]). He   Patient Active Problem List    Diagnosis Date Noted   • Left inguinal hernia 07/31/2023   • BPH associated with nocturia 03/28/2023   • History of stapedectomy 11/13/2022   • Mixed conductive and sensorineural hearing loss of right ear with restricted hearing of left ear 07/14/2021   • Vitamin D deficiency 01/29/2018   • Essential hypertension 11/20/2017   • Right bundle branch block (RBBB) 11/20/2017     He  has a past surgical history that includes Appendectomy; Prostate biopsy; Eye surgery; Knee surgery (Left); pr arthroscopy knee w/meniscus rpr medial&lateral (Right, 05/23/2019); and EAR SURGERY (Right). His family history includes Cancer in his father; Hypertension in his family; No Known Problems in his mother. He  reports that he has never smoked. He has never used smokeless tobacco. He reports that he does not currently use alcohol. He reports that he does not use drugs.   Current Outpatient Medications on File Prior to Visit   Medication Sig   • Cholecalciferol (VITAMIN D3) 5000 units CAPS daily    • losartan (COZAAR) 50 mg tablet TAKE 1 TABLET DAILY   • tamsulosin (FLOMAX) 0.4 mg Take 1 capsule (0.4 mg total) by mouth daily with dinner   • [DISCONTINUED] benzonatate (TESSALON PERLES) 100 mg capsule Take 1 capsule (100 mg total) by mouth 3 (three) times a day as needed for cough   • [DISCONTINUED] scopolamine (TRANSDERM-SCOP) 1 mg/3 days TD 72 hr patch Place 1 patch on the skin over 72 hours every third day     No current facility-administered medications on file prior to visit. He has No Known Allergies. .    Review of Systems   Constitutional: Negative for chills, fatigue and fever. HENT: Negative for congestion, hearing loss, rhinorrhea and sore throat. Eyes: Negative for pain and discharge. Respiratory: Negative for cough, chest tightness and shortness of breath. Cardiovascular: Negative for chest pain and palpitations. Gastrointestinal: Negative for abdominal pain, constipation, diarrhea, nausea and vomiting. Positive left groin bulge   Endocrine: Negative for cold intolerance and heat intolerance. Genitourinary: Negative for difficulty urinating and dysuria. Musculoskeletal: Negative for back pain and neck pain. Skin: Negative for color change and rash. Allergic/Immunologic: Negative for environmental allergies and food allergies. Neurological: Negative for seizures and headaches. Hematological: Negative for adenopathy. Does not bruise/bleed easily. Psychiatric/Behavioral: Negative for confusion and hallucinations. Objective:      /80 (BP Location: Left arm, Patient Position: Sitting, Cuff Size: Standard)   Pulse 65   Temp 97.8 °F (36.6 °C)   Resp 18   Ht 5' 10" (1.778 m)   Wt 97.3 kg (214 lb 9.6 oz)   SpO2 96%   BMI 30.79 kg/m²     Below is the patient's most recent value for Albumin, ALT, AST, BUN, Calcium, Chloride, Cholesterol, CO2, Creatinine, GFR, Glucose, HDL, Hematocrit, Hemoglobin, Hemoglobin A1C, LDL, Magnesium, Phosphorus, Platelets, Potassium, PSA, Sodium, Triglycerides, and WBC.    Lab Results   Component Value Date    ALT 24 04/14/2023    AST 13 04/14/2023    BUN 13 07/03/2023    CALCIUM 8.9 07/03/2023     07/03/2023    CO2 26 07/03/2023    CREATININE 1.06 07/03/2023    HDL 33 (L) 04/14/2023    HCT 51.0 (H) 07/03/2023    HGB 16.9 07/03/2023    HGBA1C 5.6 08/10/2019     (L) 07/03/2023    K 4.4 07/03/2023    PSA 2.6 04/14/2023    TRIG 128 04/14/2023    WBC 8.16 07/03/2023     Note: for a comprehensive list of the patient's lab results, access the Results Review activity. Physical Exam  Constitutional:       Appearance: Normal appearance. HENT:      Head: Normocephalic and atraumatic. Nose: Nose normal.   Eyes:      General: No scleral icterus. Conjunctiva/sclera: Conjunctivae normal.   Cardiovascular:      Rate and Rhythm: Normal rate and regular rhythm. Heart sounds: Normal heart sounds. Pulmonary:      Effort: Pulmonary effort is normal.      Breath sounds: Normal breath sounds. Abdominal:      General: There is no distension. Palpations: Abdomen is soft. Tenderness: There is no abdominal tenderness. Comments: Large incarcerated left-sided scrotal inguinal hernia, no obvious inguinal hernia on the right   Musculoskeletal:         General: No signs of injury. Skin:     General: Skin is warm. Coloration: Skin is not jaundiced. Neurological:      General: No focal deficit present. Mental Status: He is alert and oriented to person, place, and time.    Psychiatric:         Mood and Affect: Mood normal.         Behavior: Behavior normal.

## 2023-07-31 NOTE — PROGRESS NOTES
Assessment/Plan:  79-year-old male with incarcerated left inguinal hernia  -Presents due to left groin bulge  -Noticed a large bulge in his left groin for a few months  -Does not cause him any significant discomfort  -Patient is tolerating a diet well and having bowel function  -On exam there is an incarcerated left scrotal inguinal hernia, no obvious inguinal hernia on the right  -Emergency department note from 7/3/2023 reviewed  -CT scan of the abdomen and pelvis from 7/3/2023 report and images reviewed, this was also reviewed with the patient  -CBC and BMP from 7/3/2023 reviewed  -Plan for open left inguinal hernia repair with mesh  -EKG ordered  -Request primary care physician risk stratification and optimization    A visual was used to display the anatomy and the proposed incision, procedure, steps, and mesh placement. The risks were explained at length and outlined, not limited to bleeding, infection, recurrence, pain, testicular loss, and damage to other structures. The planned approximately one hour procedure was discussed along with 1 - 2 week recovery, resolution of pain and swelling timeline, and 4 weeks of light duty without lifting. Questions were answered to satisfaction and consent was signed. 1. Left inguinal hernia  -     Ambulatory Referral to General Surgery  -     Case request operating room: REPAIR HERNIA INGUINAL - Open with Mesh; Standing  -     EKG 12 lead; Future  -     Case request operating room: REPAIR HERNIA INGUINAL - Open with Mesh               Subjective:      Patient ID: Arlene Mcallister is a 68 y.o. male. Triage Notes:    Patient is a 79-year-old male who presents the office for evaluation due to left groin bulge. Patient has noticed a bulge in his left groin over the last few months. It has gotten progressively larger. He states it is normally stuck out. He denies any significant discomfort. He has been tolerating a diet and having normal bowel function.       The following portions of the patient's history were reviewed and updated as appropriate:   He  has a past medical history of Arthritis of carpometacarpal [place]) joint of thumb, Bundle branch block, right, Complex tear of lateral meniscus of right knee (05/15/2019), Dupuytren's contracture, Elevated PSA, GERD (gastroesophageal reflux disease) (2010), Hypertension, Nocturnal hypoxemia (11/20/2017), JAY (obstructive sleep apnea), Polycythemia, and Thrombocytopenia ([address]). He   Patient Active Problem List    Diagnosis Date Noted   • Left inguinal hernia 07/31/2023   • BPH associated with nocturia 03/28/2023   • History of stapedectomy 11/13/2022   • Mixed conductive and sensorineural hearing loss of right ear with restricted hearing of left ear 07/14/2021   • Vitamin D deficiency 01/29/2018   • Essential hypertension 11/20/2017   • Right bundle branch block (RBBB) 11/20/2017     He  has a past surgical history that includes Appendectomy; Prostate biopsy; Eye surgery; Knee surgery (Left); pr arthroscopy knee w/meniscus rpr medial&lateral (Right, 05/23/2019); and EAR SURGERY (Right). His family history includes Cancer in his father; Hypertension in his family; No Known Problems in his mother. He  reports that he has never smoked. He has never used smokeless tobacco. He reports that he does not currently use alcohol. He reports that he does not use drugs.   Current Outpatient Medications on File Prior to Visit   Medication Sig   • Cholecalciferol (VITAMIN D3) 5000 units CAPS daily    • losartan (COZAAR) 50 mg tablet TAKE 1 TABLET DAILY   • tamsulosin (FLOMAX) 0.4 mg Take 1 capsule (0.4 mg total) by mouth daily with dinner   • [DISCONTINUED] benzonatate (TESSALON PERLES) 100 mg capsule Take 1 capsule (100 mg total) by mouth 3 (three) times a day as needed for cough   • [DISCONTINUED] scopolamine (TRANSDERM-SCOP) 1 mg/3 days TD 72 hr patch Place 1 patch on the skin over 72 hours every third day     No current facility-administered medications on file prior to visit. He has No Known Allergies. .    Review of Systems   Constitutional: Negative for chills, fatigue and fever. HENT: Negative for congestion, hearing loss, rhinorrhea and sore throat. Eyes: Negative for pain and discharge. Respiratory: Negative for cough, chest tightness and shortness of breath. Cardiovascular: Negative for chest pain and palpitations. Gastrointestinal: Negative for abdominal pain, constipation, diarrhea, nausea and vomiting. Positive left groin bulge   Endocrine: Negative for cold intolerance and heat intolerance. Genitourinary: Negative for difficulty urinating and dysuria. Musculoskeletal: Negative for back pain and neck pain. Skin: Negative for color change and rash. Allergic/Immunologic: Negative for environmental allergies and food allergies. Neurological: Negative for seizures and headaches. Hematological: Negative for adenopathy. Does not bruise/bleed easily. Psychiatric/Behavioral: Negative for confusion and hallucinations. Objective:      /80 (BP Location: Left arm, Patient Position: Sitting, Cuff Size: Standard)   Pulse 65   Temp 97.8 °F (36.6 °C)   Resp 18   Ht 5' 10" (1.778 m)   Wt 97.3 kg (214 lb 9.6 oz)   SpO2 96%   BMI 30.79 kg/m²     Below is the patient's most recent value for Albumin, ALT, AST, BUN, Calcium, Chloride, Cholesterol, CO2, Creatinine, GFR, Glucose, HDL, Hematocrit, Hemoglobin, Hemoglobin A1C, LDL, Magnesium, Phosphorus, Platelets, Potassium, PSA, Sodium, Triglycerides, and WBC.    Lab Results   Component Value Date    ALT 24 04/14/2023    AST 13 04/14/2023    BUN 13 07/03/2023    CALCIUM 8.9 07/03/2023     07/03/2023    CO2 26 07/03/2023    CREATININE 1.06 07/03/2023    HDL 33 (L) 04/14/2023    HCT 51.0 (H) 07/03/2023    HGB 16.9 07/03/2023    HGBA1C 5.6 08/10/2019     (L) 07/03/2023    K 4.4 07/03/2023    PSA 2.6 04/14/2023    TRIG 128 04/14/2023    WBC 8.16 07/03/2023     Note: for a comprehensive list of the patient's lab results, access the Results Review activity. Physical Exam  Constitutional:       Appearance: Normal appearance. HENT:      Head: Normocephalic and atraumatic. Nose: Nose normal.   Eyes:      General: No scleral icterus. Conjunctiva/sclera: Conjunctivae normal.   Cardiovascular:      Rate and Rhythm: Normal rate and regular rhythm. Heart sounds: Normal heart sounds. Pulmonary:      Effort: Pulmonary effort is normal.      Breath sounds: Normal breath sounds. Abdominal:      General: There is no distension. Palpations: Abdomen is soft. Tenderness: There is no abdominal tenderness. Comments: Large incarcerated left-sided scrotal inguinal hernia, no obvious inguinal hernia on the right   Musculoskeletal:         General: No signs of injury. Skin:     General: Skin is warm. Coloration: Skin is not jaundiced. Neurological:      General: No focal deficit present. Mental Status: He is alert and oriented to person, place, and time.    Psychiatric:         Mood and Affect: Mood normal.         Behavior: Behavior normal.

## 2023-08-04 ENCOUNTER — OFFICE VISIT (OUTPATIENT)
Dept: LAB | Facility: HOSPITAL | Age: 73
End: 2023-08-04
Attending: STUDENT IN AN ORGANIZED HEALTH CARE EDUCATION/TRAINING PROGRAM
Payer: COMMERCIAL

## 2023-08-04 DIAGNOSIS — K40.90 LEFT INGUINAL HERNIA: ICD-10-CM

## 2023-08-04 LAB
ATRIAL RATE: 79 BPM
P AXIS: 64 DEGREES
PR INTERVAL: 144 MS
QRS AXIS: 4 DEGREES
QRSD INTERVAL: 144 MS
QT INTERVAL: 392 MS
QTC INTERVAL: 449 MS
T WAVE AXIS: 12 DEGREES
VENTRICULAR RATE: 79 BPM

## 2023-08-04 PROCEDURE — 93005 ELECTROCARDIOGRAM TRACING: CPT

## 2023-08-04 PROCEDURE — 93010 ELECTROCARDIOGRAM REPORT: CPT | Performed by: INTERNAL MEDICINE

## 2023-08-09 ENCOUNTER — OFFICE VISIT (OUTPATIENT)
Age: 73
End: 2023-08-09
Payer: COMMERCIAL

## 2023-08-09 VITALS
WEIGHT: 211 LBS | HEIGHT: 70 IN | SYSTOLIC BLOOD PRESSURE: 128 MMHG | BODY MASS INDEX: 30.21 KG/M2 | DIASTOLIC BLOOD PRESSURE: 88 MMHG | RESPIRATION RATE: 18 BRPM | HEART RATE: 92 BPM | TEMPERATURE: 97.6 F | OXYGEN SATURATION: 99 %

## 2023-08-09 DIAGNOSIS — Z01.818 PREOPERATIVE CLEARANCE: Primary | ICD-10-CM

## 2023-08-09 PROCEDURE — 99214 OFFICE O/P EST MOD 30 MIN: CPT

## 2023-08-09 NOTE — PROGRESS NOTES
Presurgical Evaluation    Subjective:      Patient ID: Finas Gitelman is a 68 y.o. male. Chief Complaint   Patient presents with   • Pre-op Exam       Presents for preop clearance. Patient states he is having hernia repair, states noticed the hernia  about 4 months ago. Denies any pain. The following portions of the patient's history were reviewed and updated as appropriate: allergies, current medications, past family history, past medical history, past social history, past surgical history and problem list.    Procedure date: 08/16/2023    Surgeon: Dr Guzman Hand procedure:  Repair Hernia Inguinal-Open with mesh  Diagnosis for procedure: Inguinal hernia    Prior anesthesia: Yes   General; Complications:  None / Tolerated well    CAD History: None   NOTE: Patient should see Cardiology if time available before surgery, and if appropriate. Pulmonary History: None    Renal history: CKD stage 2 - GFR 60-89    Diabetes History:  None     Neurological History: None     On Immunosuppressant meds/biologics: No      Review of Systems   Constitutional: Negative for chills and fever. HENT: Negative for ear pain and sore throat. Eyes: Negative for pain and visual disturbance. Respiratory: Negative for cough and shortness of breath. Cardiovascular: Negative for chest pain and palpitations. Gastrointestinal: Negative for abdominal pain and vomiting. Genitourinary: Negative for dysuria and hematuria. Musculoskeletal: Negative for arthralgias and back pain. Skin: Negative for color change and rash. Neurological: Negative for seizures and syncope. All other systems reviewed and are negative.         Current Outpatient Medications   Medication Sig Dispense Refill   • Cholecalciferol (VITAMIN D3) 5000 units CAPS daily      • losartan (COZAAR) 50 mg tablet TAKE 1 TABLET DAILY 90 tablet 3   • tamsulosin (FLOMAX) 0.4 mg Take 1 capsule (0.4 mg total) by mouth daily with dinner 90 capsule 3     No current facility-administered medications for this visit. Allergies on file:   Patient has no known allergies.     Patient Active Problem List   Diagnosis   • Essential hypertension   • Vitamin D deficiency   • Right bundle branch block (RBBB)   • Mixed conductive and sensorineural hearing loss of right ear with restricted hearing of left ear   • History of stapedectomy   • BPH associated with nocturia   • Left inguinal hernia        Past Medical History:   Diagnosis Date   • Arthritis of carpometacarpal ([place]) joint of thumb    • Bundle branch block, right    • Complex tear of lateral meniscus of right knee 05/15/2019    Added automatically from request for surgery [id number]   • Dupuytren's contracture    • Elevated PSA    • GERD (gastroesophageal reflux disease) 2010   • Hypertension    • Nocturnal hypoxemia 11/20/2017   • JAY (obstructive sleep apnea)    • Polycythemia    • Thrombocytopenia ([facility])        Past Surgical History:   Procedure Laterality Date   • APPENDECTOMY     • EAR SURGERY Right    • EYE SURGERY      lump removed from eyelid   • KNEE SURGERY Left     arthroscopy   • IN ARTHROSCOPY KNEE W/MENISCUS RPR MEDIAL&LATERAL Right 05/23/2019    Procedure: KNEE ARTHROSCOPIC MEDIAL AND LATERAL MENISCAL REPAIR; REMOVAL LOOSE BODY; CORTISONE INJECTION;  Surgeon: Kolby King MD;  Location: [facility] OR;  Service: Orthopedics   • PROSTATE BIOPSY      prostate punch       Family History   Problem Relation Age of Onset   • Cancer Father         Lung cancer-smoker   • No Known Problems Mother    • Hypertension Family        Social History     Tobacco Use   • Smoking status: Never   • Smokeless tobacco: Never   Vaping Use   • Vaping Use: Never used   Substance Use Topics   • Alcohol use: Not Currently   • Drug use: No       Objective:    Vitals:    08/09/23 1458   BP: 128/88   BP Location: Left arm   Patient Position: Sitting   Cuff Size: Large   Pulse: 92   Resp: 18   Temp: 97.6 °F (36.4 °C)   TempSrc: Tympanic SpO2: 99%   Weight: 95.7 kg (211 lb)   Height: 5' 10" (1.778 m)        Physical Exam  Constitutional:       Appearance: Normal appearance. HENT:      Head: Normocephalic. Right Ear: Tympanic membrane normal.      Left Ear: Tympanic membrane normal.      Nose: Nose normal. No congestion. Mouth/Throat:      Pharynx: No oropharyngeal exudate or posterior oropharyngeal erythema. Eyes:      Extraocular Movements: Extraocular movements intact. Conjunctiva/sclera: Conjunctivae normal.      Pupils: Pupils are equal, round, and reactive to light. Cardiovascular:      Rate and Rhythm: Normal rate and regular rhythm. Pulses: Normal pulses. Heart sounds: Normal heart sounds. Pulmonary:      Effort: Pulmonary effort is normal. No respiratory distress. Abdominal:      General: Bowel sounds are normal.   Musculoskeletal:         General: Normal range of motion. Cervical back: Normal range of motion. Lymphadenopathy:      Cervical: No cervical adenopathy. Skin:     General: Skin is warm and dry. Neurological:      Mental Status: He is alert and oriented to person, place, and time. Psychiatric:         Mood and Affect: Mood normal.         Behavior: Behavior normal.         Thought Content: Thought content normal.           Preop labs/testing available and reviewed: yes    Completed in July, kidney function stable and H&H at baseline. EKG yes    Echo no    Stress test/cath no    PFT/Brandon no    Functional capacity: Shovel snow                          6 Mets   Pick the highest level patient can comfortably perform   4 mets or greater for surgery    RCRI  High Risk surgery? 1 Point  CAD History:         1 Point   MI; Positive Stress Test; CP due to Mi;  Nitrate Usage to control Angina;  Pathologic Q wave on EKG  CHF Active:         1 Point   Pulm Edema; Paroxysmal Nocturnal Dyspnea;  Bibasilar Rales (crackles);S3; CHF on CXR  Cerebrovascular Disease (TIA or CVA):     1 Point  DM on Insulin:        1 Point  Serum Creat >2.0 mg/dl:       1 Point          Total Points: 0     Scorin: Class I, Very Low Risk (0.4%)     1: Class II, Low risk (0.9%)     2: Class III Moderate (6.6%)     3: Class IV High (>11%)      CHANA Risk:  GFR:    69    For PCP:  If GFR>60, Hold ACE/ARB/Diuretic on the day of surgery, and NSAIDS 10 days before. If GFR<45, Consider PRE and POST op Nephrology Consult. If 46 <GFR> 59 : Has Patient had CHANA in last 6 Months? no   If YES: Preop Nephrology consult   If No:  [id number] [address] [facility] Nephrology consult. Assessment/Plan:    Patient is medically optimized (cleared) for the planned procedure. Further testing/evaluation is not required. Postop concerns: no    Problem List Items Addressed This Visit    None  Visit Diagnoses     Preoperative clearance    -  Primary  Patient cleared for hernia repair surgery. Labs reviewed, stable. EKG reviewed, normal sinus rhythm with right bundle branch block similar to previous ECGs, stable, denies any cardiac symptoms at this time. Pre-Surgery Instructions:   Medication Instructions   • Cholecalciferol (VITAMIN D3) 5000 units CAPS Stop taking 1 week prior to surgery   • losartan (COZAAR) 50 mg tablet Stop taking 1 days prior to surgery and resume the night after surgery   • tamsulosin (FLOMAX) 0.4 mg Stop taking 1 days prior to surgery then resume the night after surgery        NOTE: Please use the above to review important meds for your specialty, the remainder "per anesthesia Guidelines."    NOTE: Please place an Inbasket message for "[facility]" pool for complicated patients.

## 2023-08-16 ENCOUNTER — HOSPITAL ENCOUNTER (OUTPATIENT)
Facility: HOSPITAL | Age: 73
Setting detail: OUTPATIENT SURGERY
Discharge: HOME | End: 2023-08-16
Attending: STUDENT IN AN ORGANIZED HEALTH CARE EDUCATION/TRAINING PROGRAM | Admitting: STUDENT IN AN ORGANIZED HEALTH CARE EDUCATION/TRAINING PROGRAM
Payer: COMMERCIAL

## 2023-08-16 ENCOUNTER — ANESTHESIA (OUTPATIENT)
Dept: PERIOP | Facility: HOSPITAL | Age: 73
End: 2023-08-16
Payer: COMMERCIAL

## 2023-08-16 ENCOUNTER — ANESTHESIA EVENT (OUTPATIENT)
Dept: PERIOP | Facility: HOSPITAL | Age: 73
End: 2023-08-16
Payer: COMMERCIAL

## 2023-08-16 VITALS
HEART RATE: 110 BPM | OXYGEN SATURATION: 95 % | TEMPERATURE: 97.6 F | SYSTOLIC BLOOD PRESSURE: 140 MMHG | BODY MASS INDEX: 30.96 KG/M2 | DIASTOLIC BLOOD PRESSURE: 81 MMHG | WEIGHT: 216.27 LBS | RESPIRATION RATE: 18 BRPM | HEIGHT: 70 IN

## 2023-08-16 DIAGNOSIS — K40.90 LEFT INGUINAL HERNIA: Primary | ICD-10-CM

## 2023-08-16 PROCEDURE — 49507 PRP I/HERN INIT BLOCK >5 YR: CPT | Performed by: PHYSICIAN ASSISTANT

## 2023-08-16 PROCEDURE — C1781 MESH (IMPLANTABLE): HCPCS | Performed by: STUDENT IN AN ORGANIZED HEALTH CARE EDUCATION/TRAINING PROGRAM

## 2023-08-16 PROCEDURE — 49507 PRP I/HERN INIT BLOCK >5 YR: CPT | Performed by: STUDENT IN AN ORGANIZED HEALTH CARE EDUCATION/TRAINING PROGRAM

## 2023-08-16 DEVICE — BARD MESH PERFIX PLUG, LARGE
Type: IMPLANTABLE DEVICE | Site: ABDOMEN | Status: FUNCTIONAL
Brand: BARD MESH PERFIX PLUG

## 2023-08-16 RX ORDER — ACETAMINOPHEN 325 MG/1
650 TABLET ORAL EVERY 6 HOURS PRN
Status: DISCONTINUED | OUTPATIENT
Start: 2023-08-16 | End: 2023-08-16 | Stop reason: HOSPADM

## 2023-08-16 RX ORDER — DOCUSATE SODIUM 100 MG/1
100 CAPSULE, LIQUID FILLED ORAL 3 TIMES DAILY PRN
Qty: 30 CAPSULE | Refills: 0 | Status: SHIPPED | OUTPATIENT
Start: 2023-08-16

## 2023-08-16 RX ORDER — SODIUM CHLORIDE, SODIUM LACTATE, POTASSIUM CHLORIDE, CALCIUM CHLORIDE 600; 310; 30; 20 MG/100ML; MG/100ML; MG/100ML; MG/100ML
INJECTION, SOLUTION INTRAVENOUS CONTINUOUS PRN
Status: DISCONTINUED | OUTPATIENT
Start: 2023-08-16 | End: 2023-08-16

## 2023-08-16 RX ORDER — HEPARIN SODIUM 5000 [USP'U]/ML
5000 INJECTION, SOLUTION INTRAVENOUS; SUBCUTANEOUS ONCE
Status: COMPLETED | OUTPATIENT
Start: 2023-08-16 | End: 2023-08-16

## 2023-08-16 RX ORDER — CHLORHEXIDINE GLUCONATE 4 G/100ML
SOLUTION TOPICAL DAILY PRN
Status: DISCONTINUED | OUTPATIENT
Start: 2023-08-16 | End: 2023-08-16 | Stop reason: HOSPADM

## 2023-08-16 RX ORDER — PROMETHAZINE HYDROCHLORIDE 25 MG/ML
25 INJECTION, SOLUTION INTRAMUSCULAR; INTRAVENOUS ONCE AS NEEDED
Status: DISCONTINUED | OUTPATIENT
Start: 2023-08-16 | End: 2023-08-16 | Stop reason: HOSPADM

## 2023-08-16 RX ORDER — MAGNESIUM HYDROXIDE 1200 MG/15ML
LIQUID ORAL AS NEEDED
Status: DISCONTINUED | OUTPATIENT
Start: 2023-08-16 | End: 2023-08-16 | Stop reason: HOSPADM

## 2023-08-16 RX ORDER — BUPIVACAINE HYDROCHLORIDE 2.5 MG/ML
INJECTION, SOLUTION EPIDURAL; INFILTRATION; INTRACAUDAL AS NEEDED
Status: DISCONTINUED | OUTPATIENT
Start: 2023-08-16 | End: 2023-08-16 | Stop reason: HOSPADM

## 2023-08-16 RX ORDER — DEXAMETHASONE SODIUM PHOSPHATE 10 MG/ML
INJECTION, SOLUTION INTRAMUSCULAR; INTRAVENOUS AS NEEDED
Status: DISCONTINUED | OUTPATIENT
Start: 2023-08-16 | End: 2023-08-16

## 2023-08-16 RX ORDER — OXYCODONE HYDROCHLORIDE 5 MG/1
5 TABLET ORAL ONCE
Status: COMPLETED | OUTPATIENT
Start: 2023-08-16 | End: 2023-08-16

## 2023-08-16 RX ORDER — HYDROMORPHONE HCL/PF 1 MG/ML
0.2 SYRINGE (ML) INJECTION
Status: DISCONTINUED | OUTPATIENT
Start: 2023-08-16 | End: 2023-08-16 | Stop reason: HOSPADM

## 2023-08-16 RX ORDER — KETOROLAC TROMETHAMINE 30 MG/ML
INJECTION, SOLUTION INTRAMUSCULAR; INTRAVENOUS AS NEEDED
Status: DISCONTINUED | OUTPATIENT
Start: 2023-08-16 | End: 2023-08-16

## 2023-08-16 RX ORDER — TRAMADOL HYDROCHLORIDE 50 MG/1
50 TABLET ORAL EVERY 6 HOURS PRN
Status: DISCONTINUED | OUTPATIENT
Start: 2023-08-16 | End: 2023-08-16 | Stop reason: HOSPADM

## 2023-08-16 RX ORDER — TRAMADOL HYDROCHLORIDE 50 MG/1
50 TABLET ORAL EVERY 6 HOURS PRN
Qty: 16 TABLET | Refills: 0 | Status: SHIPPED | OUTPATIENT
Start: 2023-08-16 | End: 2023-08-26

## 2023-08-16 RX ORDER — PROPOFOL 10 MG/ML
INJECTION, EMULSION INTRAVENOUS AS NEEDED
Status: DISCONTINUED | OUTPATIENT
Start: 2023-08-16 | End: 2023-08-16

## 2023-08-16 RX ORDER — CEFAZOLIN SODIUM 2 G/50ML
2000 SOLUTION INTRAVENOUS ONCE
Status: COMPLETED | OUTPATIENT
Start: 2023-08-16 | End: 2023-08-16

## 2023-08-16 RX ORDER — FENTANYL CITRATE/PF 50 MCG/ML
25 SYRINGE (ML) INJECTION
Status: DISCONTINUED | OUTPATIENT
Start: 2023-08-16 | End: 2023-08-16 | Stop reason: HOSPADM

## 2023-08-16 RX ORDER — DIPHENHYDRAMINE HYDROCHLORIDE 50 MG/ML
12.5 INJECTION INTRAMUSCULAR; INTRAVENOUS ONCE AS NEEDED
Status: DISCONTINUED | OUTPATIENT
Start: 2023-08-16 | End: 2023-08-16 | Stop reason: HOSPADM

## 2023-08-16 RX ORDER — FENTANYL CITRATE 50 UG/ML
INJECTION, SOLUTION INTRAMUSCULAR; INTRAVENOUS AS NEEDED
Status: DISCONTINUED | OUTPATIENT
Start: 2023-08-16 | End: 2023-08-16

## 2023-08-16 RX ORDER — ONDANSETRON 2 MG/ML
INJECTION INTRAMUSCULAR; INTRAVENOUS AS NEEDED
Status: DISCONTINUED | OUTPATIENT
Start: 2023-08-16 | End: 2023-08-16

## 2023-08-16 RX ADMIN — KETOROLAC TROMETHAMINE 30 MG: 30 INJECTION, SOLUTION INTRAMUSCULAR; INTRAVENOUS at 14:06

## 2023-08-16 RX ADMIN — FENTANYL CITRATE 100 MCG: 50 INJECTION, SOLUTION INTRAMUSCULAR; INTRAVENOUS at 13:29

## 2023-08-16 RX ADMIN — ACETAMINOPHEN 650 MG: 325 TABLET, FILM COATED ORAL at 17:11

## 2023-08-16 RX ADMIN — CEFAZOLIN SODIUM 2000 MG: 2 SOLUTION INTRAVENOUS at 12:34

## 2023-08-16 RX ADMIN — DEXAMETHASONE SODIUM PHOSPHATE 10 MG: 10 INJECTION, SOLUTION INTRAMUSCULAR; INTRAVENOUS at 12:29

## 2023-08-16 RX ADMIN — CEFAZOLIN SODIUM 2000 MG: 2 SOLUTION INTRAVENOUS at 12:18

## 2023-08-16 RX ADMIN — FENTANYL CITRATE 25 MCG: 50 INJECTION INTRAMUSCULAR; INTRAVENOUS at 15:11

## 2023-08-16 RX ADMIN — SODIUM CHLORIDE, SODIUM LACTATE, POTASSIUM CHLORIDE, AND CALCIUM CHLORIDE: .6; .31; .03; .02 INJECTION, SOLUTION INTRAVENOUS at 11:55

## 2023-08-16 RX ADMIN — FENTANYL CITRATE 50 MCG: 50 INJECTION, SOLUTION INTRAMUSCULAR; INTRAVENOUS at 12:41

## 2023-08-16 RX ADMIN — TRAMADOL HYDROCHLORIDE 50 MG: 50 TABLET, COATED ORAL at 15:57

## 2023-08-16 RX ADMIN — FENTANYL CITRATE 50 MCG: 50 INJECTION, SOLUTION INTRAMUSCULAR; INTRAVENOUS at 12:29

## 2023-08-16 RX ADMIN — OXYCODONE HYDROCHLORIDE 5 MG: 5 TABLET ORAL at 17:11

## 2023-08-16 RX ADMIN — FENTANYL CITRATE 50 MCG: 50 INJECTION, SOLUTION INTRAMUSCULAR; INTRAVENOUS at 13:01

## 2023-08-16 RX ADMIN — FENTANYL CITRATE 50 MCG: 50 INJECTION, SOLUTION INTRAMUSCULAR; INTRAVENOUS at 12:59

## 2023-08-16 RX ADMIN — FENTANYL CITRATE 25 MCG: 50 INJECTION INTRAMUSCULAR; INTRAVENOUS at 15:04

## 2023-08-16 RX ADMIN — HEPARIN SODIUM 5000 UNITS: 5000 INJECTION INTRAVENOUS; SUBCUTANEOUS at 12:05

## 2023-08-16 RX ADMIN — PROPOFOL 200 MG: 10 INJECTION, EMULSION INTRAVENOUS at 12:29

## 2023-08-16 RX ADMIN — ONDANSETRON 4 MG: 2 INJECTION INTRAMUSCULAR; INTRAVENOUS at 13:33

## 2023-08-16 NOTE — OP NOTE
OPERATIVE REPORT  PATIENT NAME: Deanna Rowell    :  1950  MRN: [id number]  Pt Location: [facility] ROOM 02    SURGERY DATE: 2023    Surgeon(s) and Role:     * Neva Milligan DO - Primary     * Dayan Gonzalez PA-C - Assisting    Preop Diagnosis:  Left inguinal hernia [K40.90]    Post-Op Diagnosis Codes:     * Left inguinal hernia [K40.90]    Procedure(s):  Left - REPAIR HERNIA INGUINAL - Open with Mesh    Specimen(s):  * No specimens in log *    Estimated Blood Loss:   25 mL    Drains:  * No LDAs found *    Anesthesia Type:   General    Operative Indications:  Left inguinal hernia [K40.90]    Operative Findings:  Large left incarcerated scrotal inguinal hernia containing fat and sigmoid colon  Hernia repaired with plug and patch method with large plug    Complications:   None    Procedure and Technique:  The patient was seen again in Preoperative Holding. All the risks, benefits, complications, treatment options, and expected outcomes were discussed with the patient and family at length. All questions were answered to satisfaction. There was concurrence with the proposed plan and informed consent was obtained. The site of surgery was properly noted/marked. The patient was taken to Operating Room, identified, and the procedure verified as open Left inguinal hernia repair with mesh. The patient was placed in the supine position on the operating room table. The patient had received preoperative antibiotics and SCDs placed on the bilateral lower extremities. Anesthesia was then induced and the patient was intubated. The lower abdomen and groin were then prepped and draped in the usual sterile fashion using ChloraPrep. A Time-Out was then performed with all involved present confirming the correct patient, procedure, antibiotics, and any additional concerns. The pubic tubercle and ASIS were identified and marked.  The optimal place for incision was identified and a 6 cm incision was made in the skin with a #15 blade. Dissection of the of the subcutaneous tissue was done with electrocautery and bluntly to the level of the external oblique aponeurosis. The aponeurosis was cleared of fatty tissue and the external ring was identified. An incision was made in the aponeurosis was a #15 blade and it was opened in the direction of its fibers using a Metzenbaum scissors. The loose ends of the aponeurosis were tagged with clamps. The ilioinguinal nerve was identified and ligated with 0 Vicryl. Attention was then turned towards the pubic tubercle where the cord was identified and carefully and circumferentially dissected and encircled with a Penrose drain. Attention was turned towards the anterior medial portion of the cord where the sac was identified and carefully dissected away from the cord structures. Once the sac was dissected away it was opened and all contents had been previously reduced. There was an area of pericolonic fat which had adhesed to the sac. This was taken down meticulously with electrocautery and Metzenbaum scissors. Hemostasis was noted. The sac was twisted upon itself and suture ligated with an 0 Vicryl suture. The sac was transected and removed. Hemostasis was noted. Attention was then turned toward to the inguinal canal. The hernia defect was repaired with a large plug and the plug was sutured into place with interrupted 2-0 Prolene. The Bard Perfix mesh was trimmed to size and placed in the inguinal canal over the defect. The mesh was sutured at the apex at the pubic tubercle, medially to the conjoined tendon, and laterally to the inguinal ligament with 2-0 Prolene in an interrupted manner. The mesh was encircled around the cord to allow passage of the cord into the canal without entrapment. The mesh was noted to lay in the canal without tension. The canal was irrigated. Hemostasis was noted.  Contents were returned to the canal and the external oblique aponeurosis was closed with a running 0 Vicryl suture taking care not to cause entrapment. Binh's fascia was then closed with interrupted 0 Vicryl. Local anesthesia was injected using 0.25% Marcaine. A 3-0 Vicryl was used to approximate the deep dermal tissue. A 4-0 Monocryl was used to close the skin in a running subcuticular fashion. The abdomen was then cleansed and dried and Steri-Strips, 2 x 2, Tegaderm were used to cover the sites. All instrument, sponge, and needle counts were noted to be correct at the conclusion of the case. The patient was brought to the PACU in stable and satisfactory condition. I was present for the entire procedure., A qualified resident physician was not available. and A physician assistant was required during the procedure for retraction, tissue handling, dissection and suturing.     Patient Disposition:  extubated and stable        SIGNATURE: Manuela Bagley DO  DATE: August 16, 2023  TIME: 2:16 PM

## 2023-08-16 NOTE — ANESTHESIA PREPROCEDURE EVALUATION
Procedure:  REPAIR HERNIA INGUINAL - Open with Mesh (Left: Abdomen)    Relevant Problems   CARDIO   (+) Essential hypertension   (+) Right bundle branch block (RBBB)     GERD  JAY  Thrombocytopenia  BPH    BMI 30       Physical Exam    Airway    Mallampati score: II  TM Distance: >3 FB  Neck ROM: full     Dental   No notable dental hx     Cardiovascular      Pulmonary      Other Findings        Anesthesia Plan  ASA Score- 2     Anesthesia Type- general with ASA Monitors. Additional Monitors:   Airway Plan: LMA. Plan Factors-    Chart reviewed. Patient summary reviewed. Induction- intravenous. Postoperative Plan- Plan for postoperative opioid use. Planned trial extubation    Informed Consent- Anesthetic plan and risks discussed with patient. I personally reviewed this patient with the CRNA. Discussed and agreed on the Anesthesia Plan with the CRNA. Yee Greer

## 2023-08-16 NOTE — INTERVAL H&P NOTE
H&P reviewed. After examining the patient I find no changes in the patients condition since the H&P had been written.     Vitals:    08/16/23 1159   BP: 145/87   Pulse: 66   Resp: 18   Temp: 97.8 °F (36.6 °C)   SpO2: 98%

## 2023-08-16 NOTE — DISCHARGE INSTR - AVS FIRST PAGE
Your incisions are covered with Steri-Strips, 4 x 4 gauze, Tegaderm. In 2 days you may remove your dressing, the Steri-Strips will remain on your skin but the 4 x 4 gauze and Tegaderm can be removed. The Steri-Strips will fall off on their own. After your dressings are removed you may shower. Do not soak your incisions including tub baths or swimming. You may shower and let water and soap wash over incisions. Do not scrub your incisions. No heavy lifting greater than 15 lb for 4 weeks or until cleared by your Surgeon. You may resume your normal diet as tolerated. Do not make any important decisions and do not drive while taking narcotic pain medication. You are prescribed Tramadol for severe pain. Take only as needed. Take Colace to soften your stool while taking narcotic pain medication. You may take Tylenol over-the-counter as needed for pain, follow instructions on the bottle. You may take Ibuprofen over-the-counter as needed for pain, follow instructions on the bottle. You may alternate Tylenol and Ibuprofen if needed, but do not take at the same time. Follow-up with your Surgeon in 2 weeks, call the office for an appointment. You will receive a survey via Email in regards to your same day surgery experience. Please fill out the survey to let us know how we did with your care.

## 2023-08-16 NOTE — ANESTHESIA POSTPROCEDURE EVALUATION
Post-Op Assessment Note    CV Status:  Stable  Pain Score: 0    Pain management: adequate     Mental Status:  Sleepy   Hydration Status:  Stable   PONV Controlled:  None   Airway Patency:  Patent      Post Op Vitals Reviewed: Yes      Staff: CRNA         No notable events documented.     BP   125/71   Temp   97.4   Pulse  91   Resp   18   SpO2   96

## 2023-09-05 ENCOUNTER — OFFICE VISIT (OUTPATIENT)
Dept: SURGERY | Facility: CLINIC | Age: 73
End: 2023-09-05
Payer: COMMERCIAL

## 2023-09-05 VITALS
TEMPERATURE: 97.8 F | HEART RATE: 75 BPM | HEIGHT: 70 IN | SYSTOLIC BLOOD PRESSURE: 122 MMHG | OXYGEN SATURATION: 97 % | WEIGHT: 213.6 LBS | BODY MASS INDEX: 30.58 KG/M2 | DIASTOLIC BLOOD PRESSURE: 78 MMHG | RESPIRATION RATE: 18 BRPM

## 2023-09-05 DIAGNOSIS — K40.90 LEFT INGUINAL HERNIA: Primary | ICD-10-CM

## 2023-09-05 PROCEDURE — 99213 OFFICE O/P EST LOW 20 MIN: CPT | Performed by: STUDENT IN AN ORGANIZED HEALTH CARE EDUCATION/TRAINING PROGRAM

## 2023-09-05 NOTE — PROGRESS NOTES
Assessment/Plan:  72-year-old male status post open left inguinal hernia repair with mesh on 8/16/2023  -Patient is tolerating a diet and having normal bowel function  -Denies any abdominal pain  -States he does have some left groin discomfort/pulling after walking the dog or in certain positions  -Left groin incision healing well without erythema induration or drainage, no signs of hernia recurrence  -Discussed that the discomfort/pulling he is feeling is normal at this time and should hopefully resolve, it is normal to feel some pulling with stretching or twisting after hernia surgery  -Recommend no heavy lifting greater than 15 to 20 pounds for 4 weeks after surgery  -Follow-up in office as needed     1. Left inguinal hernia               Subjective:      Patient ID: Anjali Faustin is a 68 y.o. male. Triage Notes:    Patient is a 72-year-old male who presents to office for evaluation status post open left inguinal hernia repair with mesh. He is tolerating a diet and having normal bowel function. He denies any abdominal pain. He states he does have some discomfort/pulling in the left groin when walking the dog or with certain movements. He has not been taking any pain medication. The following portions of the patient's history were reviewed and updated as appropriate: allergies, current medications, past family history, past medical history, past social history, past surgical history and problem list.    Review of Systems   Constitutional: Negative for chills, fatigue and fever. HENT: Negative for congestion, hearing loss, rhinorrhea and sore throat. Eyes: Negative for pain and discharge. Respiratory: Negative for cough, chest tightness and shortness of breath. Cardiovascular: Negative for chest pain and palpitations. Gastrointestinal: Negative for abdominal pain, constipation, diarrhea, nausea and vomiting. Endocrine: Negative for cold intolerance and heat intolerance.    Genitourinary: Negative for difficulty urinating and dysuria. Musculoskeletal: Negative for back pain and neck pain. Skin: Negative for color change and rash. Allergic/Immunologic: Negative for environmental allergies and food allergies. Neurological: Negative for seizures and headaches. Hematological: Negative for adenopathy. Does not bruise/bleed easily. Psychiatric/Behavioral: Negative for confusion and hallucinations. Objective:      /78 (BP Location: Right arm, Patient Position: Sitting, Cuff Size: Standard)   Pulse 75   Temp 97.8 °F (36.6 °C)   Resp 18   Ht 5' 10" (1.778 m)   Wt 96.9 kg (213 lb 9.6 oz)   SpO2 97%   BMI 30.65 kg/m²     Below is the patient's most recent value for Albumin, ALT, AST, BUN, Calcium, Chloride, Cholesterol, CO2, Creatinine, GFR, Glucose, HDL, Hematocrit, Hemoglobin, Hemoglobin A1C, LDL, Magnesium, Phosphorus, Platelets, Potassium, PSA, Sodium, Triglycerides, and WBC. Lab Results   Component Value Date    ALT 24 04/14/2023    AST 13 04/14/2023    BUN 13 07/03/2023    CALCIUM 8.9 07/03/2023     07/03/2023    CO2 26 07/03/2023    CREATININE 1.06 07/03/2023    HDL 33 (L) 04/14/2023    HCT 51.0 (H) 07/03/2023    HGB 16.9 07/03/2023    HGBA1C 5.6 08/10/2019     (L) 07/03/2023    K 4.4 07/03/2023    PSA 2.6 04/14/2023    TRIG 128 04/14/2023    WBC 8.16 07/03/2023     Note: for a comprehensive list of the patient's lab results, access the Results Review activity. Physical Exam  Constitutional:       Appearance: Normal appearance. HENT:      Head: Normocephalic and atraumatic. Nose: Nose normal.   Eyes:      General: No scleral icterus. Conjunctiva/sclera: Conjunctivae normal.   Cardiovascular:      Rate and Rhythm: Normal rate. Pulmonary:      Effort: Pulmonary effort is normal.   Abdominal:      General: There is no distension. Palpations: Abdomen is soft. Tenderness: There is no abdominal tenderness.       Comments: Left groin incision healing well without erythema induration or drainage, no signs of hernia recurrence   Musculoskeletal:         General: No signs of injury. Skin:     General: Skin is warm. Coloration: Skin is not jaundiced. Neurological:      General: No focal deficit present. Mental Status: He is alert and oriented to person, place, and time.    Psychiatric:         Mood and Affect: Mood normal.         Behavior: Behavior normal.

## 2023-09-07 DIAGNOSIS — N40.1 BPH ASSOCIATED WITH NOCTURIA: ICD-10-CM

## 2023-09-07 DIAGNOSIS — I10 BENIGN ESSENTIAL HYPERTENSION: Chronic | ICD-10-CM

## 2023-09-07 DIAGNOSIS — R35.1 BPH ASSOCIATED WITH NOCTURIA: ICD-10-CM

## 2023-09-07 RX ORDER — LOSARTAN POTASSIUM 50 MG/1
TABLET ORAL
Qty: 90 TABLET | Refills: 0 | Status: SHIPPED | OUTPATIENT
Start: 2023-09-07

## 2023-09-07 RX ORDER — TAMSULOSIN HYDROCHLORIDE 0.4 MG/1
CAPSULE ORAL
Qty: 90 CAPSULE | Refills: 0 | Status: SHIPPED | OUTPATIENT
Start: 2023-09-07

## 2023-12-06 ENCOUNTER — OFFICE VISIT (OUTPATIENT)
Dept: URGENT CARE | Facility: CLINIC | Age: 73
End: 2023-12-06
Payer: COMMERCIAL

## 2023-12-06 VITALS
SYSTOLIC BLOOD PRESSURE: 128 MMHG | DIASTOLIC BLOOD PRESSURE: 86 MMHG | OXYGEN SATURATION: 97 % | HEART RATE: 78 BPM | TEMPERATURE: 97.4 F | RESPIRATION RATE: 18 BRPM

## 2023-12-06 DIAGNOSIS — J02.9 SORE THROAT: ICD-10-CM

## 2023-12-06 DIAGNOSIS — Z20.818 STREP THROAT EXPOSURE: Primary | ICD-10-CM

## 2023-12-06 LAB — S PYO AG THROAT QL: NEGATIVE

## 2023-12-06 PROCEDURE — 99213 OFFICE O/P EST LOW 20 MIN: CPT | Performed by: PHYSICIAN ASSISTANT

## 2023-12-06 PROCEDURE — 87880 STREP A ASSAY W/OPTIC: CPT | Performed by: PHYSICIAN ASSISTANT

## 2023-12-06 PROCEDURE — 87070 CULTURE OTHR SPECIMN AEROBIC: CPT | Performed by: PHYSICIAN ASSISTANT

## 2023-12-06 RX ORDER — AMOXICILLIN 500 MG/1
500 CAPSULE ORAL EVERY 12 HOURS SCHEDULED
Qty: 20 CAPSULE | Refills: 0 | Status: SHIPPED | OUTPATIENT
Start: 2023-12-06 | End: 2023-12-16

## 2023-12-06 NOTE — PROGRESS NOTES
[facility]        NAME: Jordan Noyola is a 68 y.o. male  : 1950    MRN: [id number]  DATE: 2023  TIME: 11:57 AM    Assessment and Plan   Strep throat exposure [Z20.818]  1. Strep throat exposure  amoxicillin (AMOXIL) 500 mg capsule      2. Sore throat  POCT rapid strepA    Throat culture        POCT Strep negative. Discussed may be early to test. Will send throat culture   Will treat given Strep exposure and PE appearance     Patient Instructions       Follow up with PCP in 3-5 days. Proceed to  ER if symptoms worsen. Chief Complaint     Chief Complaint   Patient presents with   • Sore Throat     Pt c/o sore throat, headache,body aches. Pt states around grand kids that had strep last week         History of Present Illness       Patient is a 67 yo male who presents for evaluation of sore throat with associated headache and body aches starting last night. He states he was exposed to his grandkids who have Strep. No fevers. Review of Systems   Review of Systems   Constitutional:  Negative for fever. HENT:  Positive for sore throat. Negative for trouble swallowing. Musculoskeletal:  Positive for arthralgias and myalgias. Neurological:  Positive for headaches.          Current Medications       Current Outpatient Medications:   •  amoxicillin (AMOXIL) 500 mg capsule, Take 1 capsule (500 mg total) by mouth every 12 (twelve) hours for 10 days, Disp: 20 capsule, Rfl: 0  •  Cholecalciferol (VITAMIN D3) 5000 units CAPS, daily , Disp: , Rfl:   •  losartan (COZAAR) 50 mg tablet, TAKE 1 TABLET DAILY, Disp: 90 tablet, Rfl: 0  •  tamsulosin (FLOMAX) 0.4 mg, TAKE 1 CAPSULE DAILY WITH DINNER, Disp: 90 capsule, Rfl: 0    Current Allergies     Allergies as of 2023   • (No Known Allergies)            The following portions of the patient's history were reviewed and updated as appropriate: allergies, current medications, past family history, past medical history, past social history, past surgical history and problem list.     Past Medical History:   Diagnosis Date   • Arthritis of carpometacarpal [place]) joint of thumb    • Bundle branch block, right    • Complex tear of lateral meniscus of right knee 05/15/2019    Added automatically from request for surgery [id number]   • Dupuytren's contracture    • Elevated PSA    • GERD (gastroesophageal reflux disease) 2010   • Hypertension    • Nocturnal hypoxemia 11/20/2017   • JAY (obstructive sleep apnea)    • Polycythemia    • Thrombocytopenia ([address])        Past Surgical History:   Procedure Laterality Date   • APPENDECTOMY     • EAR SURGERY Right    • EYE SURGERY      lump removed from eyelid   • HERNIA REPAIR Left 8/16/2023    Procedure: [address] - Open with Mesh;  Surgeon: Lisa Arguello DO;  Location: [facility];  Service: General   • KNEE SURGERY Left     arthroscopy   • IA ARTHROSCOPY KNEE W/MENISCUS RPR MEDIAL&LATERAL Right 05/23/2019    Procedure: KNEE ARTHROSCOPIC MEDIAL AND LATERAL MENISCAL REPAIR; REMOVAL LOOSE BODY; CORTISONE INJECTION;  Surgeon: Rosalinda Macias MD;  Location: [facility];  Service: Orthopedics   • PROSTATE BIOPSY      prostate punch       Family History   Problem Relation Age of Onset   • Cancer Father         Lung cancer-smoker   • No Known Problems Mother    • Hypertension Family          Medications have been verified. Objective   /86   Pulse 78   Temp (!) 97.4 °F (36.3 °C) (Temporal)   Resp 18   SpO2 97%        Physical Exam     Physical Exam  Constitutional:       General: He is not in acute distress. Appearance: He is not toxic-appearing. HENT:      Mouth/Throat:      Comments: Diffuse erythema and mild edema of OP. Bilateral tonsils erythematous with white exudates. Uvula midline   Cardiovascular:      Rate and Rhythm: Normal rate. Pulmonary:      Effort: Pulmonary effort is normal.   Skin:     General: Skin is warm and dry.    Neurological:      Mental Status: He is alert.   Psychiatric:         Mood and Affect: Mood normal.         Behavior: Behavior normal.

## 2023-12-08 LAB — BACTERIA THROAT CULT: NORMAL

## 2023-12-13 ENCOUNTER — TELEPHONE (OUTPATIENT)
Age: 73
End: 2023-12-13

## 2023-12-13 ENCOUNTER — APPOINTMENT (OUTPATIENT)
Dept: LAB | Facility: CLINIC | Age: 73
End: 2023-12-13
Payer: COMMERCIAL

## 2023-12-13 ENCOUNTER — APPOINTMENT (OUTPATIENT)
Dept: RADIOLOGY | Facility: CLINIC | Age: 73
End: 2023-12-13
Payer: COMMERCIAL

## 2023-12-13 ENCOUNTER — OFFICE VISIT (OUTPATIENT)
Age: 73
End: 2023-12-13
Payer: COMMERCIAL

## 2023-12-13 VITALS
DIASTOLIC BLOOD PRESSURE: 100 MMHG | OXYGEN SATURATION: 95 % | HEART RATE: 105 BPM | TEMPERATURE: 101.5 F | WEIGHT: 222.6 LBS | HEIGHT: 70 IN | SYSTOLIC BLOOD PRESSURE: 138 MMHG | RESPIRATION RATE: 16 BRPM | BODY MASS INDEX: 31.87 KG/M2

## 2023-12-13 DIAGNOSIS — I10 ESSENTIAL HYPERTENSION: ICD-10-CM

## 2023-12-13 DIAGNOSIS — R05.1 ACUTE COUGH: ICD-10-CM

## 2023-12-13 DIAGNOSIS — J02.0 STREP PHARYNGITIS: Primary | ICD-10-CM

## 2023-12-13 PROBLEM — D69.6 PLATELETS DECREASED (HCC): Status: ACTIVE | Noted: 2023-12-13

## 2023-12-13 PROCEDURE — 71046 X-RAY EXAM CHEST 2 VIEWS: CPT

## 2023-12-13 PROCEDURE — 99214 OFFICE O/P EST MOD 30 MIN: CPT | Performed by: FAMILY MEDICINE

## 2023-12-13 PROCEDURE — 87636 SARSCOV2 & INF A&B AMP PRB: CPT | Performed by: FAMILY MEDICINE

## 2023-12-13 RX ORDER — BENZONATATE 200 MG/1
200 CAPSULE ORAL 3 TIMES DAILY PRN
Qty: 30 CAPSULE | Refills: 0 | Status: SHIPPED | OUTPATIENT
Start: 2023-12-13

## 2023-12-13 RX ORDER — TIZANIDINE 2 MG/1
2 TABLET ORAL EVERY 8 HOURS PRN
Qty: 20 TABLET | Refills: 0 | Status: SHIPPED | OUTPATIENT
Start: 2023-12-13

## 2023-12-13 RX ORDER — THIAMINE HCL 50 MG
50 TABLET ORAL DAILY
COMMUNITY

## 2023-12-13 RX ORDER — AMOXICILLIN AND CLAVULANATE POTASSIUM 875; 125 MG/1; MG/1
1 TABLET, FILM COATED ORAL EVERY 12 HOURS SCHEDULED
Qty: 20 TABLET | Refills: 0 | Status: SHIPPED | OUTPATIENT
Start: 2023-12-13 | End: 2023-12-23

## 2023-12-13 NOTE — ASSESSMENT & PLAN NOTE
Patient was started on amoxicillin 500 twice daily on 12/6 by urgent care for strep throat. Patient continues to have significant cough only taking NyQuil for symptoms. Poor water hydration. Worsening cough last night preventing patient from getting any sleep. Denies fevers but had mild chills last night. In the office temperature of 101. Exam no wheezing, rales, rhonchi. Oropharynx exam significant for erythema, swelling, exudate of the tonsils and pharynx, enlarged cervical lymph nodes, erythema of the ear canals bilaterally, greater on the right side. Discussed with patient in ED evaluation versus outpatient treatment. Discussed importance of controlling cough to avoid shallow breathing. Take Zanaflex 2 mg every 8 hours to control pain to avoid shallow breathing. Take Tessalon Perles 200 mg 3 times a day for the next several days to better control cough to avoid shallow breathing to decrease risk of pneumonia. Encouraged diaphragm breathing  Start Augmentin 875-125 today 2 times a day for 10 days, take with probiotics  Discussed ED parameters, if no improvement in the next 2 to 3 days may need IV antibiotics-go to the emergency room. Monitor temperature, avoid fever reducing agents for the next couple days. If persistently febrile, likely will need evaluation of the heart valves to evaluate for acute rheumatic fever  With a persistent cough, febrile, age, will follow-up with a chest x-ray  Follow-up in 1 week tentatively schedule. If improve no need to follow-up. No questions, agreed with plan.

## 2023-12-13 NOTE — TELEPHONE ENCOUNTER
PT  WIFE   CALLED    HAS  QUESTION   ABOUT  HIS  VISIT   TODAY   SHE  LM  ON  OUR  VM   TO  CALL  HER

## 2023-12-13 NOTE — TELEPHONE ENCOUNTER
S/W Eind Prado will have Pt go to [facility] today.

## 2023-12-13 NOTE — PROGRESS NOTES
[facility] PR[facility]ARE  [address] [place] [address]    Name: Henry Iqbal      YOB: 1950      MRN: [id number]  Encounter Provider: Ratna Collier MD      Encounter Date: 12/13/23      Encounter Dept: [address] [facility]     1. Strep pharyngitis  Assessment & Plan:  Patient was started on amoxicillin 500 twice daily on 12/6 by urgent care for strep throat. Patient continues to have significant cough only taking NyQuil for symptoms. Poor water hydration. Worsening cough last night preventing patient from getting any sleep. Denies fevers but had mild chills last night. In the office temperature of 101. Exam no wheezing, rales, rhonchi. Oropharynx exam significant for erythema, swelling, exudate of the tonsils and pharynx, enlarged cervical lymph nodes, erythema of the ear canals bilaterally, greater on the right side. Discussed with patient in ED evaluation versus outpatient treatment. Discussed importance of controlling cough to avoid shallow breathing. Take Zanaflex 2 mg every 8 hours to control pain to avoid shallow breathing. Take Tessalon Perles 200 mg 3 times a day for the next several days to better control cough to avoid shallow breathing to decrease risk of pneumonia. Encouraged diaphragm breathing  Start Augmentin 875-125 today 2 times a day for 10 days, take with probiotics  Discussed ED parameters, if no improvement in the next 2 to 3 days may need IV antibiotics-go to the emergency room. Monitor temperature, avoid fever reducing agents for the next couple days. If persistently febrile, likely will need evaluation of the heart valves to evaluate for acute rheumatic fever  With a persistent cough, febrile, age, will follow-up with a chest x-ray  Follow-up in 1 week tentatively schedule. If improve no need to follow-up. No questions, agreed with plan.       Orders:  - amoxicillin-clavulanate (AUGMENTIN) 875-125 mg per tablet; Take 1 tablet by mouth every 12 (twelve) hours for 10 days  -     tiZANidine (ZANAFLEX) 2 mg tablet; Take 1 tablet (2 mg total) by mouth every 8 (eight) hours as needed for muscle spasms    2. Essential hypertension  Assessment & Plan:  Elevated today, likely secondary to feeling unwell. Blood Pressure: 138/100         3. Acute cough  -     benzonatate (TESSALON) 200 MG capsule; Take 1 capsule (200 mg total) by mouth 3 (three) times a day as needed for cough  -     tiZANidine (ZANAFLEX) 2 mg tablet; Take 1 tablet (2 mg total) by mouth every 8 (eight) hours as needed for muscle spasms  -     Covid/Flu- Office Collect Normal  -     XR chest pa & lateral; Future; Expected date: 12/13/2023             Pertinent labs were evaluated and discussed with patient today. Follow-Up Plans   Return in about 1 week (around 12/20/2023). _______________________________________________________________________  Reason For Visit    Follow-up (U/C not improved post antibiotic) and Cough      Subjective   Juan Manuel Jane is a 68 y.o. with  has a past medical history of Arthritis of carpometacarpal (CMC) joint of thumb, Bundle branch block, right, Complex tear of lateral meniscus of right knee (05/15/2019), Dupuytren's contracture, Elevated PSA, GERD (gastroesophageal reflux disease) (2010), Hypertension, Nocturnal hypoxemia (11/20/2017), JAY (obstructive sleep apnea), Polycythemia, and Thrombocytopenia ([address] Today patient reports about c ough, fever. 12/6/2023: Urgent care note-73 yo male who presents for evaluation of sore throat with associated headache and body aches starting last night. He states he was exposed to his grandkids who have Strep. No fevers. Cough  The current episode started in the past 7 days. The problem has been gradually worsening. The cough is Non-productive. Associated symptoms include chills and headaches.  Pertinent negatives include no fever, postnasal drip, rash, sore throat, shortness of breath or wheezing. Treatments tried: NyQuil over-the-counter. The treatment provided mild relief. There is no history of asthma, bronchiectasis, bronchitis, COPD, emphysema or pneumonia. Review of Systems   Constitutional:  Positive for chills. Negative for fever. HENT:  Negative for postnasal drip and sore throat. Respiratory:  Positive for cough. Negative for shortness of breath and wheezing. Skin:  Negative for rash. Neurological:  Positive for headaches. Current Medication List     Current Outpatient Medications:     amoxicillin-clavulanate (AUGMENTIN) 875-125 mg per tablet, Take 1 tablet by mouth every 12 (twelve) hours for 10 days, Disp: 20 tablet, Rfl: 0    benzonatate (TESSALON) 200 MG capsule, Take 1 capsule (200 mg total) by mouth 3 (three) times a day as needed for cough, Disp: 30 capsule, Rfl: 0    Cholecalciferol (VITAMIN D3) 5000 units CAPS, daily , Disp: , Rfl:     losartan (COZAAR) 50 mg tablet, TAKE 1 TABLET DAILY, Disp: 90 tablet, Rfl: 0    tamsulosin (FLOMAX) 0.4 mg, TAKE 1 CAPSULE DAILY WITH DINNER, Disp: 90 capsule, Rfl: 0    thiamine (VITAMIN B1) 50 mg tablet, Take 50 mg by mouth daily, Disp: , Rfl:     tiZANidine (ZANAFLEX) 2 mg tablet, Take 1 tablet (2 mg total) by mouth every 8 (eight) hours as needed for muscle spasms, Disp: 20 tablet, Rfl: 0      Objective     /100 (BP Location: Left arm, Patient Position: Sitting, Cuff Size: Large)   Pulse 105   Temp (!) 101.5 °F (38.6 °C) (Tympanic)   Resp 16   Ht 5' 10" (1.778 m)   Wt 101 kg (222 lb 9.6 oz)   SpO2 95%   BMI 31.94 kg/m²      Physical Exam  Vitals reviewed. Constitutional:       General: He is not in acute distress. Appearance: Normal appearance. He is not ill-appearing, toxic-appearing or diaphoretic. HENT:      Head: Normocephalic and atraumatic.       Comments: Erythema of the ear canals bilaterally more so on the right.     Right Ear: External ear normal.      Left Ear: External ear normal.      Nose: Nose normal.      Mouth/Throat:      Mouth: Mucous membranes are moist.      Pharynx: Oropharyngeal exudate and posterior oropharyngeal erythema present. Tonsils: Tonsillar exudate present. Eyes:      General: No scleral icterus. Right eye: No discharge. Left eye: No discharge. Extraocular Movements: Extraocular movements intact. Conjunctiva/sclera: Conjunctivae normal.   Cardiovascular:      Rate and Rhythm: Normal rate and regular rhythm. Pulses: Normal pulses. Heart sounds: Normal heart sounds. Pulmonary:      Effort: Pulmonary effort is normal. No respiratory distress. Breath sounds: Normal breath sounds. No wheezing or rales. Abdominal:      Palpations: Abdomen is soft. Tenderness: There is no abdominal tenderness. Musculoskeletal:         General: No swelling. Normal range of motion. Cervical back: Normal range of motion. Skin:     General: Skin is warm and dry. Neurological:      General: No focal deficit present. Mental Status: He is alert and oriented to person, place, and time. Psychiatric:         Mood and Affect: Mood normal.         Behavior: Behavior normal.         Thought Content:  Thought content normal.           Sarah Galvan MD  Family Medicine Physician   [facility]

## 2023-12-13 NOTE — TELEPHONE ENCOUNTER
Spouse Renee Darling called confirmed Listed as contact. Questioned concerns over visit today as Pt is scheduled for surgery Friday. As per  Pt has Strep and medications have been changed and precautions were discussed with Pt at great length. Reviewed also with Renee Darling. Renee Darling stated questions and concerns addressed. Will call back if additional concerns arise. Advised order placed for Covid/Flu PCR placed if Pt wanted testing. Renee Darling verbalized understanding of same.

## 2023-12-13 NOTE — TELEPHONE ENCOUNTER
----- Message from Rosy Reina MD sent at 12/13/2023 12:41 PM EST -----  Quita Tesfaye! I went ahead and placed the chest x-ray. I guess just to be on the side of caution with his age. Please let patient know that he can get that completed today. It is a stat order so he can get it scheduled for today.

## 2023-12-14 LAB
FLUAV RNA RESP QL NAA+PROBE: NEGATIVE
FLUBV RNA RESP QL NAA+PROBE: NEGATIVE
SARS-COV-2 RNA RESP QL NAA+PROBE: NEGATIVE

## 2023-12-19 ENCOUNTER — OFFICE VISIT (OUTPATIENT)
Age: 73
End: 2023-12-19
Payer: COMMERCIAL

## 2023-12-19 VITALS
HEIGHT: 70 IN | DIASTOLIC BLOOD PRESSURE: 80 MMHG | TEMPERATURE: 98.7 F | RESPIRATION RATE: 18 BRPM | HEART RATE: 110 BPM | WEIGHT: 217 LBS | SYSTOLIC BLOOD PRESSURE: 130 MMHG | BODY MASS INDEX: 31.07 KG/M2 | OXYGEN SATURATION: 98 %

## 2023-12-19 DIAGNOSIS — J02.0 STREP PHARYNGITIS: Primary | ICD-10-CM

## 2023-12-19 DIAGNOSIS — D69.6 PLATELETS DECREASED (HCC): ICD-10-CM

## 2023-12-19 DIAGNOSIS — R05.3 PERSISTENT COUGH: ICD-10-CM

## 2023-12-19 DIAGNOSIS — J30.2 SEASONAL ALLERGIES: ICD-10-CM

## 2023-12-19 DIAGNOSIS — I10 ESSENTIAL HYPERTENSION: ICD-10-CM

## 2023-12-19 PROCEDURE — 99214 OFFICE O/P EST MOD 30 MIN: CPT | Performed by: FAMILY MEDICINE

## 2023-12-19 RX ORDER — FLUTICASONE PROPIONATE 50 MCG
1 SPRAY, SUSPENSION (ML) NASAL DAILY
Qty: 15.8 ML | Refills: 3 | Status: SHIPPED | OUTPATIENT
Start: 2023-12-19

## 2023-12-19 RX ORDER — IPRATROPIUM BROMIDE 21 UG/1
2 SPRAY, METERED NASAL EVERY 12 HOURS
Qty: 30 ML | Refills: 0 | Status: SHIPPED | OUTPATIENT
Start: 2023-12-19

## 2023-12-19 RX ORDER — ALBUTEROL SULFATE 90 UG/1
2 AEROSOL, METERED RESPIRATORY (INHALATION) EVERY 6 HOURS PRN
Qty: 18 G | Refills: 5 | Status: SHIPPED | OUTPATIENT
Start: 2023-12-19

## 2023-12-19 NOTE — PROGRESS NOTES
[facility]  [address] [place]    Name: Juan Pablo Pierre      YOB: 1950      MRN: [id number]  Encounter Provider: Óscar Dickerson MD      Encounter Date: 12/19/23      Encounter Dept: [facility]    Assessment & Plan     1. Strep pharyngitis  Assessment & Plan:  Improving except for persistent cough and nighttime requiring multiple pillows to elevate the head.  Completed Augmentin.  Chest x-ray -12/11.    Will follow-up with an echo, PFT to evaluate for cardiac and pulmonary etiology  Start Atrovent nasal spray for possible postnasal drip causing the persistence of cough  Consider PPI at next appointment for possible reflux is contributing factor  Return in 2 months after completion of imaging, sooner if needed.      2. Persistent cough  Assessment & Plan:  Improving except for persistent cough and nighttime requiring multiple pillows to elevate the head.  Completed Augmentin.  Chest x-ray -12/11.    Will follow-up with an echo, PFT to evaluate for cardiac and pulmonary etiology  Start Atrovent nasal spray for possible postnasal drip causing the persistence of cough  Consider PPI at next appointment for possible reflux is contributing factor  Return in 2 months after completion of imaging, sooner if needed.    Orders:  -     ipratropium (ATROVENT) 0.03 % nasal spray; 2 sprays into each nostril every 12 (twelve) hours  -     Echo complete w/ contrast if indicated; Future; Expected date: 12/19/2023  -     Complete PFT with post bronchodilator; Future  -     albuterol (Ventolin HFA) 90 mcg/act inhaler; Inhale 2 puffs every 6 (six) hours as needed for wheezing    3. Seasonal allergies  -     fluticasone (FLONASE) 50 mcg/act nasal spray; 1 spray into each nostril daily    4. Essential hypertension  Assessment & Plan:  Elevated today, likely secondary to feeling unwell.   Blood Pressure: 130/80    Currently on losartan 50 mg daily  Continue current regimen.      5. Platelets decreased (HCC)  Assessment & Plan:  Monitoring for now.          BMI Counseling: Body mass index is 31.14 kg/m². The BMI is above normal. Nutrition recommendations include decreasing portion sizes, encouraging healthy choices of fruits and vegetables, decreasing fast food intake, consuming healthier snacks, limiting drinks that contain sugar, moderation in carbohydrate intake, increasing intake of lean protein, reducing intake of saturated and trans fat and reducing intake of cholesterol. Exercise recommendations include moderate physical activity 150 minutes/week and strength training exercises. Rationale for BMI follow-up plan is due to patient being overweight or obese.         Pertinent labs were evaluated and discussed with patient today.       Follow-Up Plans   Return in about 2 months (around 2/19/2024).            _______________________________________________________________________  Reason For Visit    Follow-up (One week/)      Felisha Pierre is a 73 y.o. with  has a past medical history of Arthritis of carpometacarpal (CMC) joint of thumb, Bundle branch block, right, Complex tear of lateral meniscus of right knee (05/15/2019), Dupuytren's contracture, Elevated PSA, GERD (gastroesophageal reflux disease) (2010), Hypertension, Nocturnal hypoxemia (11/20/2017), JAY (obstructive sleep apnea), Polycythemia, and Thrombocytopenia (HCC).      Today patient reports about follow-up strep throat, cough.     Mouth breather, deviated septum, enlarged tonsils, previously advised to remove tonsils over 20 years ago.            Review of Systems   Constitutional:  Negative for chills and fever.   HENT:  Negative for sore throat.    Eyes:  Negative for visual disturbance.   Respiratory:  Positive for cough. Negative for chest tightness and shortness of breath.          Current Medication List     Current Outpatient  "Medications:     albuterol (Ventolin HFA) 90 mcg/act inhaler, Inhale 2 puffs every 6 (six) hours as needed for wheezing, Disp: 18 g, Rfl: 5    fluticasone (FLONASE) 50 mcg/act nasal spray, 1 spray into each nostril daily, Disp: 15.8 mL, Rfl: 3    ipratropium (ATROVENT) 0.03 % nasal spray, 2 sprays into each nostril every 12 (twelve) hours, Disp: 30 mL, Rfl: 0    amoxicillin-clavulanate (AUGMENTIN) 875-125 mg per tablet, Take 1 tablet by mouth every 12 (twelve) hours for 10 days, Disp: 20 tablet, Rfl: 0    benzonatate (TESSALON) 200 MG capsule, Take 1 capsule (200 mg total) by mouth 3 (three) times a day as needed for cough, Disp: 30 capsule, Rfl: 0    Cholecalciferol (VITAMIN D3) 5000 units CAPS, daily , Disp: , Rfl:     losartan (COZAAR) 50 mg tablet, TAKE 1 TABLET DAILY, Disp: 90 tablet, Rfl: 0    tamsulosin (FLOMAX) 0.4 mg, TAKE 1 CAPSULE DAILY WITH DINNER, Disp: 90 capsule, Rfl: 0    thiamine (VITAMIN B1) 50 mg tablet, Take 50 mg by mouth daily, Disp: , Rfl:     tiZANidine (ZANAFLEX) 2 mg tablet, Take 1 tablet (2 mg total) by mouth every 8 (eight) hours as needed for muscle spasms, Disp: 20 tablet, Rfl: 0      Objective     /80 (BP Location: Left arm, Patient Position: Sitting, Cuff Size: Standard)   Pulse (!) 110   Temp 98.7 °F (37.1 °C) (Tympanic)   Resp 18   Ht 5' 10\" (1.778 m)   Wt 98.4 kg (217 lb)   SpO2 98%   BMI 31.14 kg/m²      Physical Exam  Vitals reviewed.   Constitutional:       General: He is not in acute distress.     Appearance: Normal appearance. He is not ill-appearing, toxic-appearing or diaphoretic.   HENT:      Head: Normocephalic and atraumatic.      Right Ear: External ear normal.      Left Ear: External ear normal.      Nose: Nose normal.      Mouth/Throat:      Mouth: Mucous membranes are moist.   Eyes:      General: No scleral icterus.        Right eye: No discharge.         Left eye: No discharge.      Conjunctiva/sclera: Conjunctivae normal.   Cardiovascular:      Rate and " Rhythm: Normal rate and regular rhythm.      Heart sounds: Normal heart sounds.   Pulmonary:      Effort: Pulmonary effort is normal. No respiratory distress.      Breath sounds: No wheezing, rhonchi or rales.   Skin:     General: Skin is warm.   Neurological:      General: No focal deficit present.      Mental Status: He is alert and oriented to person, place, and time.   Psychiatric:         Mood and Affect: Mood normal.         Behavior: Behavior normal.         Thought Content: Thought content normal.           Óscar Dickerson MD  Family Medicine Physician   [facility] PRIMARY CARE [place]

## 2023-12-19 NOTE — ASSESSMENT & PLAN NOTE
Elevated today, likely secondary to feeling unwell.   Blood Pressure: 130/80   Currently on losartan 50 mg daily  Continue current regimen.

## 2023-12-19 NOTE — ASSESSMENT & PLAN NOTE
Improving except for persistent cough and nighttime requiring multiple pillows to elevate the head.  Completed Augmentin.  Chest x-ray -12/11.    Will follow-up with an echo, PFT to evaluate for cardiac and pulmonary etiology  Start Atrovent nasal spray for possible postnasal drip causing the persistence of cough  Consider PPI at next appointment for possible reflux is contributing factor  Return in 2 months after completion of imaging, sooner if needed.

## 2024-01-05 ENCOUNTER — HOSPITAL ENCOUNTER (OUTPATIENT)
Dept: NON INVASIVE DIAGNOSTICS | Facility: CLINIC | Age: 74
Discharge: HOME | End: 2024-01-05
Payer: COMMERCIAL

## 2024-01-05 VITALS
DIASTOLIC BLOOD PRESSURE: 80 MMHG | HEIGHT: 70 IN | HEART RATE: 70 BPM | BODY MASS INDEX: 31.07 KG/M2 | WEIGHT: 217 LBS | SYSTOLIC BLOOD PRESSURE: 130 MMHG

## 2024-01-05 DIAGNOSIS — R05.3 PERSISTENT COUGH: ICD-10-CM

## 2024-01-05 LAB
AORTIC ROOT: 3.5 CM
APICAL FOUR CHAMBER EJECTION FRACTION: 56 %
E WAVE DECELERATION TIME: 160 MS
E/A RATIO: 0.82
FRACTIONAL SHORTENING: 37 (ref 28–44)
INTERVENTRICULAR SEPTUM IN DIASTOLE (PARASTERNAL SHORT AXIS VIEW): 0.9 CM
INTERVENTRICULAR SEPTUM: 0.9 CM (ref 0.6–1.1)
LAAS-AP4: 13.7 CM2
LEFT ATRIUM SIZE: 3.6 CM
LEFT INTERNAL DIMENSION IN SYSTOLE: 2.6 CM (ref 2.1–4)
LEFT VENTRICULAR INTERNAL DIMENSION IN DIASTOLE: 4.1 CM (ref 3.5–6)
LEFT VENTRICULAR POSTERIOR WALL IN END DIASTOLE: 1 CM
LEFT VENTRICULAR STROKE VOLUME: 48 ML
LVSV (TEICH): 48 ML
MV E'TISSUE VEL-SEP: 7 CM/S
MV PEAK A VEL: 0.89 M/S
MV PEAK E VEL: 73 CM/S
MV STENOSIS PRESSURE HALF TIME: 46 MS
MV VALVE AREA P 1/2 METHOD: 4.78
RIGHT ATRIUM AREA SYSTOLE A4C: 12.5 CM2
RIGHT VENTRICLE ID DIMENSION: 2.8 CM
SL CV LV EF: 67
SL CV PED ECHO LEFT VENTRICLE DIASTOLIC VOLUME (MOD BIPLANE) 2D: 72 ML
SL CV PED ECHO LEFT VENTRICLE SYSTOLIC VOLUME (MOD BIPLANE) 2D: 24 ML
TRICUSPID ANNULAR PLANE SYSTOLIC EXCURSION: 2.4 CM

## 2024-01-05 PROCEDURE — 93306 TTE W/DOPPLER COMPLETE: CPT

## 2024-01-05 PROCEDURE — 93306 TTE W/DOPPLER COMPLETE: CPT | Performed by: INTERNAL MEDICINE

## 2024-01-16 ENCOUNTER — OFFICE VISIT (OUTPATIENT)
Age: 74
End: 2024-01-16
Payer: COMMERCIAL

## 2024-01-16 VITALS
HEIGHT: 70 IN | SYSTOLIC BLOOD PRESSURE: 126 MMHG | BODY MASS INDEX: 31.07 KG/M2 | TEMPERATURE: 97 F | OXYGEN SATURATION: 95 % | HEART RATE: 109 BPM | WEIGHT: 217 LBS | RESPIRATION RATE: 18 BRPM | DIASTOLIC BLOOD PRESSURE: 78 MMHG

## 2024-01-16 DIAGNOSIS — I10 ESSENTIAL HYPERTENSION: ICD-10-CM

## 2024-01-16 DIAGNOSIS — R05.3 CHRONIC COUGH: Primary | ICD-10-CM

## 2024-01-16 DIAGNOSIS — R10.32 LEFT GROIN PAIN: ICD-10-CM

## 2024-01-16 DIAGNOSIS — Z00.00 MEDICARE ANNUAL WELLNESS VISIT, SUBSEQUENT: ICD-10-CM

## 2024-01-16 DIAGNOSIS — T78.40XS ALLERGY, UNSPECIFIED, SEQUELA: ICD-10-CM

## 2024-01-16 DIAGNOSIS — R35.1 BPH ASSOCIATED WITH NOCTURIA: ICD-10-CM

## 2024-01-16 DIAGNOSIS — D69.6 PLATELETS DECREASED (HCC): ICD-10-CM

## 2024-01-16 DIAGNOSIS — N40.1 BPH ASSOCIATED WITH NOCTURIA: ICD-10-CM

## 2024-01-16 PROBLEM — K40.90 LEFT INGUINAL HERNIA: Status: RESOLVED | Noted: 2023-07-31 | Resolved: 2024-01-16

## 2024-01-16 PROBLEM — J02.0 STREP PHARYNGITIS: Status: RESOLVED | Noted: 2023-12-13 | Resolved: 2024-01-16

## 2024-01-16 PROCEDURE — G0439 PPPS, SUBSEQ VISIT: HCPCS | Performed by: INTERNAL MEDICINE

## 2024-01-16 PROCEDURE — 99214 OFFICE O/P EST MOD 30 MIN: CPT | Performed by: INTERNAL MEDICINE

## 2024-01-16 RX ORDER — PANTOPRAZOLE SODIUM 40 MG/1
40 TABLET, DELAYED RELEASE ORAL DAILY
Qty: 30 TABLET | Refills: 1 | Status: SHIPPED | OUTPATIENT
Start: 2024-01-16

## 2024-01-16 NOTE — PROGRESS NOTES
Assessment and Plan:     1. Chronic cough    Do a 2 month trial of PPI. Would also recommend use of flonase as he does have some post-nasal drip at times that he admits to. Check further labs below.    - pantoprazole (PROTONIX) 40 mg tablet; Take 1 tablet (40 mg total) by mouth daily  Dispense: 30 tablet; Refill: 1  - [facility] Allergy Panel, Adult; Future  - Comprehensive metabolic panel; Future  - Lipid Panel with Direct LDL reflex; Future    2. Essential hypertension    BP stable and controlled. Continue losartan.    - Comprehensive metabolic panel; Future  - Lipid Panel with Direct LDL reflex; Future    3. Platelets decreased (HCC)    Stable and chronic. Will monitor.    - CBC; Future  - Comprehensive metabolic panel; Future  - Lipid Panel with Direct LDL reflex; Future    4. Medicare annual wellness visit, subsequent    5. Allergy, unspecified, sequela  - [facility] Allergy Panel, Adult; Future    6. BPH associated with nocturia  - Ambulatory referral to Urology; Future    7. Left groin pain  - Ambulatory Referral to Physical Therapy; Future       Depression Screening and Follow-up Plan: Patient was screened for depression during today's encounter. They screened negative with a PHQ-2 score of 0.      Preventive health issues were discussed with patient, and age appropriate screening tests were ordered as noted in patient's After Visit Summary.  Personalized health advice and appropriate referrals for health education or preventive services given if needed, as noted in patient's After Visit Summary.     History of Present Illness:     Patient presents for a Medicare Wellness Visit    Chronic cough going on for awhile. He can't exactly tell me how long it's been going on in terms of months. But sounds like it has been over 3 months. Cough is intermittent and gets a tickle in his throat. Cough is dry. An annoying cough without fever, chills, shortness of breath, or wheezing.    Also had left inguinal hernia repair  6 months ago and if he sits too long and goes to get up, he will get pain in that area that limits him for a bit before going away on its own.    Also has been getting up at least 3 times at night to go the bathroom. Feels like he is getting poor sleep because of this.    Patient Care Team:  Palmer Reeves, DO as PCP - General  Palmer Reeves, DO as PCP - PCP-[facility] ([facility])     Review of Systems:     The pertinent positive and negative findings are as noted in the HPI.     Medicare Screening Tests and Risk Assessments:     Juan Pablo is here for his Subsequent Wellness visit. Last Medicare Wellness visit information reviewed, patient interviewed and updates made to the record today.      Health Risk Assessment:   Patient rates overall health as very good. Patient feels that their physical health rating is same. Patient is very satisfied with their life. Eyesight was rated as same. Hearing was rated as same. Patient feels that their emotional and mental health rating is same. Patients states they are never, rarely angry. Patient states they are sometimes unusually tired/fatigued. Pain experienced in the last 7 days has been some. Patient's pain rating has been 2/10. Patient states that he has experienced no weight loss or gain in last 6 months.     Depression Screening:   PHQ-2 Score: 0      Fall Risk Screening:   In the past year, patient has experienced: no history of falling in past year      Home Safety:  Patient does not have trouble with stairs inside or outside of their home. Patient has working smoke alarms and has working carbon monoxide detector. Home safety hazards include: none.     Nutrition:   Current diet is No Added Salt and Limited junk food.     Medications:   Patient is not currently taking any over-the-counter supplements. Patient is able to manage medications.     Activities of Daily Living (ADLs)/Instrumental Activities of Daily Living (IADLs):   Walk and transfer into and out of bed and  chair?: Yes  Dress and groom yourself?: Yes    Bathe or shower yourself?: Yes    Feed yourself? Yes  Do your laundry/housekeeping?: Yes  Manage your money, pay your bills and track your expenses?: Yes  Make your own meals?: Yes    Do your own shopping?: Yes    Previous Hospitalizations:   Any hospitalizations or ED visits within the last 12 months?: No      Advance Care Planning:   Living will: Yes    Durable POA for healthcare: Yes    Advanced directive: Yes    Five wishes given: No      Cognitive Screening:   Provider or family/friend/caregiver concerned regarding cognition?: No    PREVENTIVE SCREENINGS      Cardiovascular Screening:    General: Screening Current      Diabetes Screening:     General: Screening Current      Colorectal Cancer Screening:     General: Screening Current      Prostate Cancer Screening:    General: Screening Current      Osteoporosis Screening:    General: Screening Not Indicated      Abdominal Aortic Aneurysm (AAA) Screening:    Risk factors include: age between 65-76 yo        General: Screening Not Indicated      Lung Cancer Screening:     General: Screening Not Indicated      Hepatitis C Screening:    General: Screening Current    Screening, Brief Intervention, and Referral to Treatment (SBIRT)    Screening  Typical number of drinks in a day: 0  Typical number of drinks in a week: 0  Interpretation: Low risk drinking behavior.    AUDIT-C Screenin) How often did you have a drink containing alcohol in the past year? never  2) How many drinks did you have on a typical day when you were drinking in the past year? 0  3) How often did you have 6 or more drinks on one occasion in the past year? never    AUDIT-C Score: 0  Interpretation: Score 0-3 (male): Negative screen for alcohol misuse    Single Item Drug Screening:  How often have you used an illegal drug (including marijuana) or a prescription medication for non-medical reasons in the past year? never    Single Item Drug Screen  "Score: 0  Interpretation: Negative screen for possible drug use disorder    Brief Intervention  Alcohol & drug use screenings were reviewed. No concerns regarding substance use disorder identified.     Other Counseling Topics:   Car/seat belt/driving safety, skin self-exam, sunscreen and regular weightbearing exercise.     Social Determinants of Health     Tobacco Use: Low Risk  (1/16/2024)    Patient History     Smoking Tobacco Use: Never     Smokeless Tobacco Use: Never     Passive Exposure: Not on file   Alcohol Use: Not At Risk (9/26/2022)    AUDIT-C     Frequency of Alcohol Consumption: Monthly or less     Average Number of Drinks: 1 or 2     Frequency of Binge Drinking: Never   Financial Resource Strain: Low Risk  (1/16/2024)    Overall Financial Resource Strain (CARDIA)     Difficulty of Paying Living Expenses: Not hard at all   Food Insecurity: Not on file   Transportation Needs: No Transportation Needs (1/16/2024)    PRAPARE - Transportation     Lack of Transportation (Medical): No     Lack of Transportation (Non-Medical): No   Physical Activity: Inactive (7/1/2020)    Exercise Vital Sign     Days of Exercise per Week: 0 days     Minutes of Exercise per Session: 0 min   Stress: No Stress Concern Present (7/1/2020)    [employer] [place] [employer] - Occupational Stress Questionnaire     Feeling of Stress : Not at all   Social Connections: Not on file   Intimate Partner Violence: Not on file   Depression: Not at risk (1/16/2024)    PHQ-2     PHQ-2 Score: 0   Housing Stability: Not on file   Utilities: Not on file   Health Literacy: Not on file      Physical Exam:     /78 (BP Location: Left arm, Patient Position: Sitting, Cuff Size: Standard)   Pulse (!) 109   Temp (!) 97 °F (36.1 °C) (Tympanic)   Resp 18   Ht 5' 10\" (1.778 m)   Wt 98.4 kg (217 lb) Comment: with shoes on  SpO2 95%   BMI 31.14 kg/m²     Physical Exam  Constitutional:       General: He is not in acute distress.     " Appearance: He is not ill-appearing.   Cardiovascular:      Rate and Rhythm: Normal rate and regular rhythm.      Heart sounds: No murmur heard.  Pulmonary:      Effort: Pulmonary effort is normal. No respiratory distress.      Breath sounds: No wheezing.   Abdominal:      General: Bowel sounds are normal. There is no distension.      Tenderness: There is no abdominal tenderness.   Musculoskeletal:      Right lower leg: No edema.      Left lower leg: No edema.   Neurological:      Mental Status: He is alert.        Palmer Waltonbenjamin, DO

## 2024-01-23 ENCOUNTER — HOSPITAL ENCOUNTER (OUTPATIENT)
Dept: PULMONOLOGY | Facility: HOSPITAL | Age: 74
Discharge: HOME | End: 2024-01-23
Payer: COMMERCIAL

## 2024-01-23 DIAGNOSIS — R05.3 PERSISTENT COUGH: ICD-10-CM

## 2024-01-23 PROCEDURE — 94060 EVALUATION OF WHEEZING: CPT | Performed by: INTERNAL MEDICINE

## 2024-01-23 PROCEDURE — 94060 EVALUATION OF WHEEZING: CPT

## 2024-01-23 PROCEDURE — 94729 DIFFUSING CAPACITY: CPT | Performed by: INTERNAL MEDICINE

## 2024-01-23 PROCEDURE — 94726 PLETHYSMOGRAPHY LUNG VOLUMES: CPT

## 2024-01-23 PROCEDURE — 94726 PLETHYSMOGRAPHY LUNG VOLUMES: CPT | Performed by: INTERNAL MEDICINE

## 2024-01-23 PROCEDURE — 94729 DIFFUSING CAPACITY: CPT

## 2024-01-23 PROCEDURE — 94760 N-INVAS EAR/PLS OXIMETRY 1: CPT

## 2024-01-23 RX ORDER — ALBUTEROL SULFATE 2.5 MG/3ML
2.5 SOLUTION RESPIRATORY (INHALATION) ONCE
Status: COMPLETED | OUTPATIENT
Start: 2024-01-23 | End: 2024-01-23

## 2024-01-23 RX ADMIN — ALBUTEROL SULFATE 2.5 MG: 2.5 SOLUTION RESPIRATORY (INHALATION) at 13:49

## 2024-01-31 PROBLEM — J98.4 SMALL AIRWAYS DISEASE: Status: ACTIVE | Noted: 2024-01-31

## 2024-03-05 DIAGNOSIS — N40.1 BPH ASSOCIATED WITH NOCTURIA: ICD-10-CM

## 2024-03-05 DIAGNOSIS — I10 BENIGN ESSENTIAL HYPERTENSION: Chronic | ICD-10-CM

## 2024-03-05 DIAGNOSIS — R35.1 BPH ASSOCIATED WITH NOCTURIA: ICD-10-CM

## 2024-03-05 RX ORDER — TAMSULOSIN HYDROCHLORIDE 0.4 MG/1
CAPSULE ORAL
Qty: 90 CAPSULE | Refills: 3 | Status: SHIPPED | OUTPATIENT
Start: 2024-03-05

## 2024-03-05 RX ORDER — LOSARTAN POTASSIUM 50 MG/1
TABLET ORAL
Qty: 90 TABLET | Refills: 3 | Status: SHIPPED | OUTPATIENT
Start: 2024-03-05

## 2024-03-11 PROBLEM — G47.33 OSA (OBSTRUCTIVE SLEEP APNEA): Status: ACTIVE | Noted: 2024-03-11

## 2024-03-11 NOTE — PROGRESS NOTES
"     Problem List Items Addressed This Visit       BPH associated with nocturia     Low risk for prostate cancer based on digital rectal exam showing a 65 g smooth gland without consistency changes and a normal PSA.  I did offer him a trial of finasteride for decreasing his gland size which can be helpful in decreasing the number of nocturnal voids.  He does wish to try behavioral modification at this time I did speak with him about cardiovascular exercise earlier in the day as well as good sleep hygiene practices and trying either a low-dose of a nonsteroidal anti-inflammatory or Tylenol at night as well as consideration of a mild antihistamine prior to bed which can be helpful in opening of her airways.  He will see us in 3 months time, if ongoing nocturia at that time we can consider the addition of 5 mg p.o. daily of finasteride in addition to his tamsulosin which he will continue taking at the current time.    I did speak with him about surgical therapies as well such as transurethral resection of prostate.  He does not want this at the current time and this is reasonable given that he does not meet absolute indications for surgery currently.    His AUA symptom score is 25/5 indicating severe symptoms with severe bother.         JAY (obstructive sleep apnea) - Primary     I spoke with Juan Pablo about the relationship of sleep disordered breathing and nocturnal overproduction of urine.  He does state that his sleep apnea has been diagnosed as a mild case.  Short of use of a positive airway pressure machine I did speak with him about use of nasal saline spray as well as Breathe Right strips to open his upper airway which can be helpful in decreasing nocturia episodes         Relevant Orders    POCT Measure PVR (Completed)           Portions of the above record have been created with voice recognition software.  Occasional wrong word or \"sound alike\" substitution may have occurred due to the inherent limitations of " voice recognition software.  Read the chart carefully and recognize, using context, where substitution may have occurred.    Assessment and plan:       Please see problem oriented charting for the assessment plan of today's urological complaints      Bird Garcia MD      Chief Complaint     As listed above      History of Present Illness     Juan Pablo Pierre is a 73 y.o. man with nocturia.  He is referred in consultation by Palmer Reeves DO and today's consult has been sent to the referring doctor.  This has been present for some time, 2 times per night, some weeks ago this increased to 3 times per night with some decrease in his quality of sleep.    No aggravating relieving factors apart from some recognition that the intake of seafood does worsen his nocturia however he quite enjoys this genre of food and does not wish to stop taking this.  We did talk about behavioral and surgical and medical treatments for nocturia today.    No other associated symptoms.  He is mostly retired and spends his time doing various activities.  He is on his way to look after his boat after today's visit.  He is interested in behavioral therapies prior to further medical therapies or consideration of surgical therapies for lower urinary tract symptoms and nocturia.    He has been taking tamsulosin for quite some time.  No history of urologic malignancy dramality or surgical intervention in the past.    PVR 72 mL    The following portions of the patient's history were reviewed and updated as appropriate: allergies, current medications, past family history, past medical history, past social history, past surgical history and problem list.    Detailed Urologic History     - please refer to HPI    Review of Systems     Review of Systems   Constitutional: Negative.    HENT: Negative.     Eyes: Negative.    Respiratory: Negative.     Cardiovascular: Negative.    Gastrointestinal: Negative.    Endocrine: Negative.    Genitourinary:          As per HPI   Musculoskeletal: Negative.    Skin: Negative.    Allergic/Immunologic: Negative.    Neurological: Negative.    Hematological: Negative.    Psychiatric/Behavioral: Negative.         AUA SYMPTOM SCORE      Flowsheet Row Most Recent Value   AUA SYMPTOM SCORE    How often have you had a sensation of not emptying your bladder completely after you finished urinating? 3 (P)    How often have you had to urinate again less than two hours after you finished urinating? 4 (P)    How often have you found you stopped and started again several times when you urinate? 2 (P)    How often have you found it difficult to postpone urination? 4 (P)    How often have you had a weak urinary stream? 4 (P)    How often have you had to push or strain to begin urination? 3 (P)    How many times did you most typically get up to urinate from the time you went to bed at night until the time you got up in the morning? 5 (P)    Quality of Life: If you were to spend the rest of your life with your urinary condition just the way it is now, how would you feel about that? 5 (P)    AUA SYMPTOM SCORE 25 (P)               Allergies     No Known Allergies    Physical Exam     Physical Exam  Vitals and nursing note reviewed.   Constitutional:       General: He is not in acute distress.     Appearance: Normal appearance. He is well-developed. He is not ill-appearing, toxic-appearing or diaphoretic.   HENT:      Head: Normocephalic and atraumatic.   Eyes:      General: No scleral icterus.  Pulmonary:      Effort: Pulmonary effort is normal. No respiratory distress.   Abdominal:      General: There is no distension.      Palpations: Abdomen is soft.      Tenderness: There is no abdominal tenderness.   Musculoskeletal:         General: No deformity.      Cervical back: Neck supple.   Skin:     Coloration: Skin is not jaundiced or pale.   Neurological:      Mental Status: He is alert and oriented to person, place, and time. Mental status is at  "baseline.      Cranial Nerves: No cranial nerve deficit.      Motor: No weakness.      Coordination: Coordination normal.   Psychiatric:         Mood and Affect: Mood normal.         Behavior: Behavior normal.         Thought Content: Thought content normal.         Judgment: Judgment normal.             Vital Signs  Vitals:    03/12/24 1103   BP: 128/82   Pulse: 67   SpO2: 98%   Weight: 95.3 kg (210 lb)   Height: 5' 10\" (1.778 m)         Current Medications       Current Outpatient Medications:     B Complex Vitamins (B COMPLEX 1 PO), , Disp: , Rfl:     Cholecalciferol (VITAMIN D3) 5000 units CAPS, daily , Disp: , Rfl:     losartan (COZAAR) 50 mg tablet, TAKE 1 TABLET DAILY, Disp: 90 tablet, Rfl: 3    pantoprazole (PROTONIX) 40 mg tablet, Take 1 tablet (40 mg total) by mouth daily, Disp: 30 tablet, Rfl: 1    tamsulosin (FLOMAX) 0.4 mg, TAKE 1 CAPSULE DAILY WITH DINNER, Disp: 90 capsule, Rfl: 3    thiamine (VITAMIN B1) 50 mg tablet, Take 50 mg by mouth daily, Disp: , Rfl:       Active Problems     Patient Active Problem List   Diagnosis    Essential hypertension    Vitamin D deficiency    Right bundle branch block (RBBB)    Mixed conductive and sensorineural hearing loss of right ear with restricted hearing of left ear    History of stapedectomy    BPH associated with nocturia    Platelets decreased (HCC)    Seasonal allergies    Persistent cough    Small airways disease    JAY (obstructive sleep apnea)         Past Medical History     Past Medical History:   Diagnosis Date    Arthritis of carpometacarpal (CMC) joint of thumb     Bundle branch block, right     Complex tear of lateral meniscus of right knee 05/15/2019    Added automatically from request for surgery [id number]    Dupuytren's contracture     Elevated PSA     GERD (gastroesophageal reflux disease) 2010    Hypertension     Left inguinal hernia     Nocturnal hypoxemia 11/20/2017    JAY (obstructive sleep apnea) 3/11/2024    Polycythemia     Thrombocytopenia " ([facility])          Surgical History     Past Surgical History:   Procedure Laterality Date    APPENDECTOMY      EAR SURGERY Right     EYE SURGERY      lump removed from eyelid    HERNIA REPAIR Left 8/16/2023    Procedure: REPAIR HERNIA INGUINAL - Open with Mesh;  Surgeon: Bird Harvey DO;  Location: [facility];  Service: General    KNEE SURGERY Left     arthroscopy    CO ARTHROSCOPY KNEE W/MENISCUS RPR MEDIAL&LATERAL Right 05/23/2019    Procedure: KNEE ARTHROSCOPIC MEDIAL AND LATERAL MENISCAL REPAIR; REMOVAL LOOSE BODY; CORTISONE INJECTION;  Surgeon: Kimberlyn Mccracken MD;  Location: [facility];  Service: Orthopedics    PROSTATE BIOPSY      prostate punch         Family History     Family History   Problem Relation Age of Onset    Cancer Father         Lung cancer-smoker    No Known Problems Mother     Hypertension Family          Social History     Social History     Social History     Tobacco Use   Smoking Status Never    Passive exposure: Past   Smokeless Tobacco Never         Pertinent Lab Values     Lab Results   Component Value Date    CREATININE 1.06 07/03/2023       Lab Results   Component Value Date    PSA 2.6 04/14/2023    PSA 2.0 03/11/2020    PSA 3.4 08/10/2019

## 2024-03-11 NOTE — PATIENT INSTRUCTIONS
Discussed with patient multifactorial nature of nocturia.    Discussed fluid modification, avoidance of bladder irritants especially at night, leg elevation for 1 hour prior to bed with a pre bed void, appropriate timing of medication intake    Discussed the need for multimodal therapy in certain cases of nocturia    Also discussed with patient incorporating exercise as able in addition to incorporating good sleep hygiene

## 2024-03-12 ENCOUNTER — OFFICE VISIT (OUTPATIENT)
Dept: UROLOGY | Facility: CLINIC | Age: 74
End: 2024-03-12
Payer: COMMERCIAL

## 2024-03-12 VITALS
BODY MASS INDEX: 30.06 KG/M2 | WEIGHT: 210 LBS | OXYGEN SATURATION: 98 % | SYSTOLIC BLOOD PRESSURE: 128 MMHG | HEIGHT: 70 IN | HEART RATE: 67 BPM | DIASTOLIC BLOOD PRESSURE: 82 MMHG

## 2024-03-12 DIAGNOSIS — N40.1 BPH ASSOCIATED WITH NOCTURIA: ICD-10-CM

## 2024-03-12 DIAGNOSIS — G47.33 OSA (OBSTRUCTIVE SLEEP APNEA): Primary | ICD-10-CM

## 2024-03-12 DIAGNOSIS — R35.1 BPH ASSOCIATED WITH NOCTURIA: ICD-10-CM

## 2024-03-12 LAB — POST-VOID RESIDUAL VOLUME, ML POC: 72 ML

## 2024-03-12 PROCEDURE — 99204 OFFICE O/P NEW MOD 45 MIN: CPT | Performed by: UROLOGY

## 2024-03-12 PROCEDURE — 51798 US URINE CAPACITY MEASURE: CPT | Performed by: UROLOGY

## 2024-03-12 NOTE — ASSESSMENT & PLAN NOTE
Low risk for prostate cancer based on digital rectal exam showing a 65 g smooth gland without consistency changes and a normal PSA.  I did offer him a trial of finasteride for decreasing his gland size which can be helpful in decreasing the number of nocturnal voids.  He does wish to try behavioral modification at this time I did speak with him about cardiovascular exercise earlier in the day as well as good sleep hygiene practices and trying either a low-dose of a nonsteroidal anti-inflammatory or Tylenol at night as well as consideration of a mild antihistamine prior to bed which can be helpful in opening of her airways.  He will see us in 3 months time, if ongoing nocturia at that time we can consider the addition of 5 mg p.o. daily of finasteride in addition to his tamsulosin which he will continue taking at the current time.    I did speak with him about surgical therapies as well such as transurethral resection of prostate.  He does not want this at the current time and this is reasonable given that he does not meet absolute indications for surgery currently.    His AUA symptom score is 25/5 indicating severe symptoms with severe bother.

## 2024-03-12 NOTE — ASSESSMENT & PLAN NOTE
I spoke with Juan Pablo about the relationship of sleep disordered breathing and nocturnal overproduction of urine.  He does state that his sleep apnea has been diagnosed as a mild case.  Short of use of a positive airway pressure machine I did speak with him about use of nasal saline spray as well as Breathe Right strips to open his upper airway which can be helpful in decreasing nocturia episodes

## 2024-04-23 ENCOUNTER — OFFICE VISIT (OUTPATIENT)
Dept: SURGERY | Facility: CLINIC | Age: 74
End: 2024-04-23
Payer: COMMERCIAL

## 2024-04-23 VITALS
RESPIRATION RATE: 18 BRPM | DIASTOLIC BLOOD PRESSURE: 72 MMHG | OXYGEN SATURATION: 96 % | HEIGHT: 70 IN | WEIGHT: 214.6 LBS | TEMPERATURE: 97.7 F | BODY MASS INDEX: 30.72 KG/M2 | SYSTOLIC BLOOD PRESSURE: 132 MMHG | HEART RATE: 87 BPM

## 2024-04-23 DIAGNOSIS — Z98.890 HISTORY OF LEFT INGUINAL HERNIA REPAIR: ICD-10-CM

## 2024-04-23 DIAGNOSIS — Z87.19 HISTORY OF LEFT INGUINAL HERNIA REPAIR: ICD-10-CM

## 2024-04-23 DIAGNOSIS — R10.32 LEFT GROIN PAIN: Primary | ICD-10-CM

## 2024-04-23 PROCEDURE — 99213 OFFICE O/P EST LOW 20 MIN: CPT | Performed by: STUDENT IN AN ORGANIZED HEALTH CARE EDUCATION/TRAINING PROGRAM

## 2024-04-23 NOTE — PROGRESS NOTES
Assessment/Plan:  73-year-old male with left groin pain, history of open left inguinal hernia repair with mesh on 8/16/2023  -Patient presents due to left groin pain  -Notes the pain has been present for a few months and is slowly getting worse  -Pain usually starts when he is sitting for prolonged periods of time.  It sometimes goes down the medial portion of his left leg  -Recently he got up from a sitting position and had severe pain where he needed assistance to walk  -After his he is able to walk and move around the pain will go away on its own  -On exam no palpated hernia recurrence in the left groin  -Urology note from 3/12/2024 reviewed  -Primary care physician note from 1/16/2024 reviewed  -Plan for CT scan of the abdomen and pelvis with IV contrast to evaluate for hernia recurrence and mesh placement, along with other potential causes of pain  -BMP ordered  -Patient to follow-up in office after CT scan is done     1. Left groin pain  -     Basic metabolic panel; Future  -     CT abdomen pelvis w contrast; Future; Expected date: 04/23/2024    2. History of left inguinal hernia repair  -     Basic metabolic panel; Future  -     CT abdomen pelvis w contrast; Future; Expected date: 04/23/2024               Subjective:      Patient ID: Juan Pablo Pierre is a 73 y.o. male.    Triage Notes:    Patient is a 73-year-old male who presents office for evaluation of left groin pain.  He has history of an open left inguinal hernia repair with mesh.  He states everything after surgery was overall going well but then he did develop pain in his left groin.  States that when he sits for long periods of time there is pain in the left groin and then shoots down his leg.  If he is sitting for too long and then tries to get up he has severe pain in his unable to walk without assistance.  After he is able to walk and get up for some time the pain then goes away.  Denies any back pain.  Patient did have a persistent cough few  months ago and is unsure if he developed a left groin pain during the coughing spells.        The following portions of the patient's history were reviewed and updated as appropriate:   He  has a past medical history of Arthritis of carpometacarpal (CMC) joint of thumb, Bundle branch block, right, Complex tear of lateral meniscus of right knee (05/15/2019), Dupuytren's contracture, Elevated PSA, GERD (gastroesophageal reflux disease) (2010), Hypertension, Left inguinal hernia, Nocturnal hypoxemia (11/20/2017), JAY (obstructive sleep apnea) (3/11/2024), Polycythemia, and Thrombocytopenia (HCC).  He   Patient Active Problem List    Diagnosis Date Noted    Left groin pain 04/23/2024    History of left inguinal hernia repair 04/23/2024    JAY (obstructive sleep apnea) 03/11/2024    Small airways disease 01/31/2024    Seasonal allergies 12/19/2023    Persistent cough 12/19/2023    Platelets decreased (HCC) 12/13/2023    BPH associated with nocturia 03/28/2023    History of stapedectomy 11/13/2022    Mixed conductive and sensorineural hearing loss of right ear with restricted hearing of left ear 07/14/2021    Vitamin D deficiency 01/29/2018    Essential hypertension 11/20/2017    Right bundle branch block (RBBB) 11/20/2017     He  has a past surgical history that includes Appendectomy; Prostate biopsy; Eye surgery (Right); Knee surgery (Left); pr arthroscopy knee w/meniscus rpr medial&lateral (Right, 05/23/2019); EAR SURGERY (Right); Hernia repair (Left, 08/16/2023); and Cologuard (Historical).  His family history includes Cancer in his father; Hypertension in his family; No Known Problems in his mother.  He  reports that he has never smoked. He has been exposed to tobacco smoke. He has never used smokeless tobacco. He reports that he does not currently use alcohol. He reports that he does not use drugs.  Current Outpatient Medications on File Prior to Visit   Medication Sig    B Complex Vitamins (B COMPLEX 1 PO)      "Cholecalciferol (VITAMIN D3) 5000 units CAPS daily     losartan (COZAAR) 50 mg tablet TAKE 1 TABLET DAILY    pantoprazole (PROTONIX) 40 mg tablet Take 1 tablet (40 mg total) by mouth daily    tamsulosin (FLOMAX) 0.4 mg TAKE 1 CAPSULE DAILY WITH DINNER    thiamine (VITAMIN B1) 50 mg tablet Take 50 mg by mouth daily     No current facility-administered medications on file prior to visit.     He has No Known Allergies..    Review of Systems   Constitutional:  Negative for chills, fatigue and fever.   HENT:  Negative for congestion, hearing loss, rhinorrhea and sore throat.    Eyes:  Negative for pain and discharge.   Respiratory:  Negative for cough, chest tightness and shortness of breath.    Cardiovascular:  Negative for chest pain and palpitations.   Gastrointestinal:  Negative for abdominal pain, constipation, diarrhea, nausea and vomiting.        Positive left groin pain   Endocrine: Negative for cold intolerance and heat intolerance.   Genitourinary:  Negative for difficulty urinating and dysuria.   Musculoskeletal:  Negative for back pain and neck pain.   Skin:  Negative for color change and rash.   Allergic/Immunologic: Negative for environmental allergies and food allergies.   Neurological:  Negative for seizures and headaches.   Hematological:  Negative for adenopathy. Does not bruise/bleed easily.   Psychiatric/Behavioral:  Negative for confusion and hallucinations.          Objective:      /72 (BP Location: Left arm, Patient Position: Sitting, Cuff Size: Standard)   Pulse 87   Temp 97.7 °F (36.5 °C)   Resp 18   Ht 5' 10\" (1.778 m)   Wt 97.3 kg (214 lb 9.6 oz)   SpO2 96%   BMI 30.79 kg/m²     Below is the patient's most recent value for Albumin, ALT, AST, BUN, Calcium, Chloride, Cholesterol, CO2, Creatinine, GFR, Glucose, HDL, Hematocrit, Hemoglobin, Hemoglobin A1C, LDL, Magnesium, Phosphorus, Platelets, Potassium, PSA, Sodium, Triglycerides, and WBC.   Lab Results   Component Value Date    ALT " 24 04/14/2023    AST 13 04/14/2023    BUN 13 07/03/2023    CALCIUM 8.9 07/03/2023     07/03/2023    CO2 26 07/03/2023    CREATININE 1.06 07/03/2023    HDL 33 (L) 04/14/2023    HCT 51.0 (H) 07/03/2023    HGB 16.9 07/03/2023    HGBA1C 5.6 08/10/2019     (L) 07/03/2023    K 4.4 07/03/2023    PSA 2.6 04/14/2023    TRIG 128 04/14/2023    WBC 8.16 07/03/2023     Note: for a comprehensive list of the patient's lab results, access the Results Review activity.     Physical Exam  Constitutional:       Appearance: Normal appearance.   HENT:      Head: Normocephalic and atraumatic.      Nose: Nose normal.   Eyes:      General: No scleral icterus.     Conjunctiva/sclera: Conjunctivae normal.   Cardiovascular:      Rate and Rhythm: Normal rate.   Pulmonary:      Effort: Pulmonary effort is normal.   Abdominal:      General: There is no distension.      Palpations: Abdomen is soft.      Tenderness: There is no abdominal tenderness.      Comments: Left groin incision well-healed, no palpated hernia recurrence   Musculoskeletal:         General: No signs of injury.   Skin:     General: Skin is warm.      Coloration: Skin is not jaundiced.   Neurological:      General: No focal deficit present.      Mental Status: He is alert and oriented to person, place, and time.   Psychiatric:         Mood and Affect: Mood normal.         Behavior: Behavior normal.

## 2024-04-25 ENCOUNTER — APPOINTMENT (OUTPATIENT)
Dept: LAB | Facility: CLINIC | Age: 74
End: 2024-04-25
Payer: COMMERCIAL

## 2024-04-25 DIAGNOSIS — Z98.890 HISTORY OF LEFT INGUINAL HERNIA REPAIR: ICD-10-CM

## 2024-04-25 DIAGNOSIS — D69.6 PLATELETS DECREASED (HCC): ICD-10-CM

## 2024-04-25 DIAGNOSIS — R05.3 CHRONIC COUGH: ICD-10-CM

## 2024-04-25 DIAGNOSIS — R10.32 LEFT GROIN PAIN: ICD-10-CM

## 2024-04-25 DIAGNOSIS — Z87.19 HISTORY OF LEFT INGUINAL HERNIA REPAIR: ICD-10-CM

## 2024-04-25 DIAGNOSIS — I10 ESSENTIAL HYPERTENSION: ICD-10-CM

## 2024-04-25 DIAGNOSIS — T78.40XS ALLERGY, UNSPECIFIED, SEQUELA: ICD-10-CM

## 2024-04-25 LAB
ALBUMIN SERPL BCP-MCNC: 4.2 G/DL (ref 3.5–5)
ALP SERPL-CCNC: 71 U/L (ref 34–104)
ALT SERPL W P-5'-P-CCNC: 19 U/L (ref 7–52)
ANION GAP SERPL CALCULATED.3IONS-SCNC: 8 MMOL/L (ref 4–13)
AST SERPL W P-5'-P-CCNC: 18 U/L (ref 13–39)
BILIRUB SERPL-MCNC: 0.73 MG/DL (ref 0.2–1)
BUN SERPL-MCNC: 17 MG/DL (ref 5–25)
CALCIUM SERPL-MCNC: 9.3 MG/DL (ref 8.4–10.2)
CHLORIDE SERPL-SCNC: 107 MMOL/L (ref 96–108)
CHOLEST SERPL-MCNC: 166 MG/DL
CO2 SERPL-SCNC: 27 MMOL/L (ref 21–32)
CREAT SERPL-MCNC: 0.95 MG/DL (ref 0.6–1.3)
ERYTHROCYTE [DISTWIDTH] IN BLOOD BY AUTOMATED COUNT: 14.6 % (ref 11.6–15.1)
GFR SERPL CREATININE-BSD FRML MDRD: 79 ML/MIN/1.73SQ M
GLUCOSE P FAST SERPL-MCNC: 97 MG/DL (ref 65–99)
HCT VFR BLD AUTO: 53.9 % (ref 36.5–49.3)
HDLC SERPL-MCNC: 36 MG/DL
HGB BLD-MCNC: 17.4 G/DL (ref 12–17)
LDLC SERPL CALC-MCNC: 103 MG/DL (ref 0–100)
MCH RBC QN AUTO: 28.5 PG (ref 26.8–34.3)
MCHC RBC AUTO-ENTMCNC: 32.3 G/DL (ref 31.4–37.4)
MCV RBC AUTO: 88 FL (ref 82–98)
PLATELET # BLD AUTO: 132 THOUSANDS/UL (ref 149–390)
PMV BLD AUTO: 12.3 FL (ref 8.9–12.7)
POTASSIUM SERPL-SCNC: 4.9 MMOL/L (ref 3.5–5.3)
PROT SERPL-MCNC: 6.9 G/DL (ref 6.4–8.4)
RBC # BLD AUTO: 6.1 MILLION/UL (ref 3.88–5.62)
SODIUM SERPL-SCNC: 142 MMOL/L (ref 135–147)
TRIGL SERPL-MCNC: 135 MG/DL
WBC # BLD AUTO: 7.7 THOUSAND/UL (ref 4.31–10.16)

## 2024-04-25 PROCEDURE — 82785 ASSAY OF IGE: CPT

## 2024-04-25 PROCEDURE — 80053 COMPREHEN METABOLIC PANEL: CPT

## 2024-04-25 PROCEDURE — 80061 LIPID PANEL: CPT

## 2024-04-25 PROCEDURE — 86003 ALLG SPEC IGE CRUDE XTRC EA: CPT

## 2024-04-25 PROCEDURE — 36415 COLL VENOUS BLD VENIPUNCTURE: CPT

## 2024-04-25 PROCEDURE — 85027 COMPLETE CBC AUTOMATED: CPT

## 2024-04-29 LAB

## 2024-05-03 ENCOUNTER — HOSPITAL ENCOUNTER (OUTPATIENT)
Dept: CT IMAGING | Facility: HOSPITAL | Age: 74
Discharge: HOME | End: 2024-05-03
Attending: STUDENT IN AN ORGANIZED HEALTH CARE EDUCATION/TRAINING PROGRAM
Payer: COMMERCIAL

## 2024-05-03 DIAGNOSIS — R10.32 LEFT GROIN PAIN: ICD-10-CM

## 2024-05-03 DIAGNOSIS — Z87.19 HISTORY OF LEFT INGUINAL HERNIA REPAIR: ICD-10-CM

## 2024-05-03 DIAGNOSIS — Z98.890 HISTORY OF LEFT INGUINAL HERNIA REPAIR: ICD-10-CM

## 2024-05-03 PROCEDURE — 74177 CT ABD & PELVIS W/CONTRAST: CPT

## 2024-05-03 RX ADMIN — IOHEXOL 100 ML: 350 INJECTION, SOLUTION INTRAVENOUS at 15:44

## 2024-05-13 ENCOUNTER — OFFICE VISIT (OUTPATIENT)
Dept: SURGERY | Facility: CLINIC | Age: 74
End: 2024-05-13
Payer: COMMERCIAL

## 2024-05-13 VITALS
DIASTOLIC BLOOD PRESSURE: 89 MMHG | WEIGHT: 214.2 LBS | BODY MASS INDEX: 30.67 KG/M2 | TEMPERATURE: 97.4 F | OXYGEN SATURATION: 96 % | HEART RATE: 71 BPM | SYSTOLIC BLOOD PRESSURE: 157 MMHG | HEIGHT: 70 IN

## 2024-05-13 DIAGNOSIS — Z87.19 HISTORY OF LEFT INGUINAL HERNIA REPAIR: ICD-10-CM

## 2024-05-13 DIAGNOSIS — Z98.890 HISTORY OF LEFT INGUINAL HERNIA REPAIR: ICD-10-CM

## 2024-05-13 DIAGNOSIS — R10.32 LEFT GROIN PAIN: Primary | ICD-10-CM

## 2024-05-13 PROCEDURE — 99213 OFFICE O/P EST LOW 20 MIN: CPT | Performed by: STUDENT IN AN ORGANIZED HEALTH CARE EDUCATION/TRAINING PROGRAM

## 2024-05-13 RX ORDER — CYCLOBENZAPRINE HCL 5 MG
5 TABLET ORAL 3 TIMES DAILY PRN
Qty: 30 TABLET | Refills: 0 | Status: SHIPPED | OUTPATIENT
Start: 2024-05-13 | End: 2024-05-13

## 2024-05-13 RX ORDER — CYCLOBENZAPRINE HCL 5 MG
5 TABLET ORAL 3 TIMES DAILY PRN
Qty: 30 TABLET | Refills: 0 | Status: SHIPPED | OUTPATIENT
Start: 2024-05-13

## 2024-05-13 NOTE — PROGRESS NOTES
Assessment/Plan:  73-year-old male with left groin pain, history of open left inguinal hernia repair with mesh on 8/16/2023  -Patient presents to follow-up CT scan results  -Notes he still has left groin pain intermittently, the pain usually occurs only when sitting for long periods of time and then he has the pain when he stands up  -Sometimes this can inhibit his walking and slow him down  -The pain starts in the left groin and sometimes radiates into the thigh, sometimes all the way down to the knee  -CT scan of the abdomen and pelvis report and images reviewed, this was also reviewed with the patient and his wife, no signs of recurrent hernia in the left inguinal region, possible left-sided sigmoid colonic epiploic appendagitis  -Patient denies any abdominal pain, most of his pain is in the groin region and then radiates down his leg, sometimes to his knee  -Recommend taking ibuprofen as needed for both the left groin pain and epiploic appendagitis  -Discussed with patient that the groin pain can be related to surgery but usually the pain will not radiate all the way down the leg to the knee  -Also possibility of a muscle strain as patient was overall recovering well for a few months prior to the pain occurring  -Physical therapy referral placed for evaluation  -Okay to try stretching and seeing if this also helps with some of the tightness  -Flexeril ordered in case this is related to muscle spasm  -Can consider Pain management consult, at this time patient will defer and try physical therapy first  -Follow-up in office as needed       1. Left groin pain  -     Ambulatory Referral to Physical Therapy; Future  -     cyclobenzaprine (FLEXERIL) 5 mg tablet; Take 1 tablet (5 mg total) by mouth 3 (three) times a day as needed for muscle spasms    2. History of left inguinal hernia repair  -     cyclobenzaprine (FLEXERIL) 5 mg tablet; Take 1 tablet (5 mg total) by mouth 3 (three) times a day as needed for muscle  "spasms               Subjective:      Patient ID: Juan Pablo Pierre is a 73 y.o. male.    Triage Notes:    Patient presents for CT scan results.  States he still has intermittent left groin pain.  Does not have any pain today.  Notes the pain usually only occurs when he has been sitting for a prolonged period of time and then he goes to stand up.  Sometimes the pain radiates all the way down to his left knee, but it always starts in his left groin.  Denies any bulge in the left groin.  Has been tolerating a diet and having bowel function.        The following portions of the patient's history were reviewed and updated as appropriate: allergies, current medications, past family history, past medical history, past social history, past surgical history and problem list.    Review of Systems   Constitutional:  Negative for chills, fatigue and fever.   HENT:  Negative for congestion, hearing loss, rhinorrhea and sore throat.    Eyes:  Negative for pain and discharge.   Respiratory:  Negative for cough, chest tightness and shortness of breath.    Cardiovascular:  Negative for chest pain and palpitations.   Gastrointestinal:  Negative for abdominal pain, constipation, diarrhea, nausea and vomiting.        Positive intermittent left groin pain   Endocrine: Negative for cold intolerance and heat intolerance.   Genitourinary:  Negative for difficulty urinating and dysuria.   Musculoskeletal:  Negative for back pain and neck pain.   Skin:  Negative for color change and rash.   Allergic/Immunologic: Negative for environmental allergies and food allergies.   Neurological:  Negative for seizures and headaches.   Hematological:  Negative for adenopathy. Does not bruise/bleed easily.   Psychiatric/Behavioral:  Negative for confusion and hallucinations.          Objective:      /89 (BP Location: Right arm, Patient Position: Sitting, Cuff Size: Standard)   Pulse 71   Temp (!) 97.4 °F (36.3 °C) (Temporal)   Ht 5' 10\" (1.778 m) "   Wt 97.2 kg (214 lb 3.2 oz)   SpO2 96%   BMI 30.73 kg/m²     Below is the patient's most recent value for Albumin, ALT, AST, BUN, Calcium, Chloride, Cholesterol, CO2, Creatinine, GFR, Glucose, HDL, Hematocrit, Hemoglobin, Hemoglobin A1C, LDL, Magnesium, Phosphorus, Platelets, Potassium, PSA, Sodium, Triglycerides, and WBC.   Lab Results   Component Value Date    ALT 19 04/25/2024    AST 18 04/25/2024    BUN 17 04/25/2024    CALCIUM 9.3 04/25/2024     04/25/2024    CO2 27 04/25/2024    CREATININE 0.95 04/25/2024    HDL 36 (L) 04/25/2024    HCT 53.9 (H) 04/25/2024    HGB 17.4 (H) 04/25/2024    HGBA1C 5.6 08/10/2019     (L) 04/25/2024    K 4.9 04/25/2024    PSA 2.6 04/14/2023    TRIG 135 04/25/2024    WBC 7.70 04/25/2024     Note: for a comprehensive list of the patient's lab results, access the Results Review activity.     Physical Exam  Constitutional:       Appearance: Normal appearance.   HENT:      Head: Normocephalic and atraumatic.      Nose: Nose normal.   Eyes:      General: No scleral icterus.     Conjunctiva/sclera: Conjunctivae normal.   Cardiovascular:      Rate and Rhythm: Normal rate.   Pulmonary:      Effort: Pulmonary effort is normal.   Musculoskeletal:         General: No signs of injury.   Skin:     General: Skin is warm.      Coloration: Skin is not jaundiced.   Neurological:      General: No focal deficit present.      Mental Status: He is alert and oriented to person, place, and time.   Psychiatric:         Mood and Affect: Mood normal.         Behavior: Behavior normal.

## 2024-05-16 ENCOUNTER — TELEPHONE (OUTPATIENT)
Age: 74
End: 2024-05-16

## 2024-05-16 NOTE — TELEPHONE ENCOUNTER
PA for Cyclobenzaprine    Submitted via    [x]CMM-KEY  [id number]  [][employer]-Case ID #    []Faxed to plan   []Other website    []Phone call Case ID #      Office notes sent, clinical questions answered. Awaiting determination    Turnaround time for your insurance to make a decision on your Prior Authorization can take 7-21 business days.

## 2024-05-20 NOTE — TELEPHONE ENCOUNTER
PA for cyclobenzaprine Denied    Reason:(Screenshot if applicable)           Message sent to office clinical pool Yes    Denial letter scanned into Media Yes    Appeal started No ( Provider will need to decide if appeal is warranted and send clinical documentation to PA team for initiation.)    **Please follow up with your patient regarding denial and next steps**

## 2024-05-21 ENCOUNTER — TELEPHONE (OUTPATIENT)
Age: 74
End: 2024-05-21

## 2024-05-21 DIAGNOSIS — R10.32 LEFT GROIN PAIN: Primary | ICD-10-CM

## 2024-05-21 RX ORDER — TIZANIDINE HYDROCHLORIDE 2 MG/1
2 CAPSULE, GELATIN COATED ORAL 3 TIMES DAILY
Qty: 30 CAPSULE | Refills: 0 | Status: SHIPPED | OUTPATIENT
Start: 2024-05-21

## 2024-05-21 NOTE — TELEPHONE ENCOUNTER
Pt of Dr. Yu-   Wife calling in to say that the muscle relaxer prescribed 5/13 was denied by insurance.   Wondering if script for alternative could be sent to [facility] on file.     Wife states he had Tizanidine previously and it was covered.

## 2024-05-21 NOTE — TELEPHONE ENCOUNTER
Spoke with pt's wife. Informed her that medication was sent to the pharmacy. She voiced understanding.

## 2024-05-22 ENCOUNTER — EVALUATION (OUTPATIENT)
Dept: PHYSICAL THERAPY | Facility: CLINIC | Age: 74
End: 2024-05-22
Payer: COMMERCIAL

## 2024-05-22 DIAGNOSIS — R10.32 LEFT GROIN PAIN: Primary | ICD-10-CM

## 2024-05-22 PROCEDURE — 97161 PT EVAL LOW COMPLEX 20 MIN: CPT | Performed by: PHYSICAL THERAPIST

## 2024-05-22 PROCEDURE — 97110 THERAPEUTIC EXERCISES: CPT | Performed by: PHYSICAL THERAPIST

## 2024-05-22 PROCEDURE — 97112 NEUROMUSCULAR REEDUCATION: CPT | Performed by: PHYSICAL THERAPIST

## 2024-05-22 NOTE — PROGRESS NOTES
PT Evaluation     Today's date: 2024  Patient name: Juan Pablo Pierre  : 1950  MRN: [id number]  Referring provider: Bird Harvey, *  Dx:   Encounter Diagnosis     ICD-10-CM    1. Left groin pain  R10.32 Ambulatory Referral to Physical Therapy          Start Time: 931  Stop Time: 1015  Total time in clinic (min): 44 minutes    Assessment  Impairments: abnormal or restricted ROM, activity intolerance, impaired physical strength, lacks appropriate home exercise program and pain with function  Functional limitations: self-care/ADLs, household activities, ambluation, and stair negotiation    Assessment details: Pt is a 72 y/o male presenting to physical therapy with L hip/groin pain. Upon examination, pt presents with impairments of decreased strength, decreased ROM, and increased pain of pt's L hip resulting in limitations of self-care/ADLs, household activities, ambluation, and stair negotiation. Pt plan of care will focus on improving strength and ROM of pt's L hip as well as decreasing pain and improving tolerance to functional activities. Pt would benefit from skilled physical therapy to facilitate a return to his prior level of function.         Understanding of Dx/Px/POC: good     Prognosis: good    Goals  STG - 4 weeks  1. Pt will have a subjective decrease in pain of pt's L hip  by 2 levels or more during driving more than 2 hours  2. Pt will demonstrate WNL AROM of pt's L hip in all planes to facilitate a return to his prior level of function    LTG - 8 weeks  1. Pt will demonstrate 4+/5 gross strength or better of pt's L hip in all planes to facilitate a return to his prior level of function  2. Pt's FOTO score will improve by 10 points or better to facilitate a return to pt's prior level of function.      Plan  Patient would benefit from: PT eval and skilled physical therapy  Planned modality interventions: cryotherapy, thermotherapy: hydrocollator packs and unattended electrical  stimulation    Planned therapy interventions: activity modification, ADL training, behavior modification, flexibility, functional ROM exercises, gait training, IASTM, joint mobilization, kinesiology taping, manual therapy, massage, Cao taping, nerve gliding, neuromuscular re-education, postural training, self care, strengthening, stretching, therapeutic activities and therapeutic exercise    Frequency: 1x week  Duration in weeks: 8  Plan of Care beginning date: 2024  Plan of Care expiration date: 2024        Subjective Evaluation    History of Present Illness  Mechanism of injury: Pt is a 72 y/o male presenting to physical therapy with L hip/groin pain. Pt reports having hernia surgery in August, however, in 2023 pt reports he started to develop L groin man. Pt reports the pain progressed the next couple months causing him to see his PCP and surgeon. Pt reports the pain will increase with prolonged sitting/driving, and lifting and moving an object. Pt reports he will have decreased pain with rest and walking. Pt reports the pain comes on later after doing all of his activities. Pt reports noticing no weakness or lack of motion of his L hip.   Patient Goals  Patient goals for therapy: decreased pain    Pain  Current pain ratin  At best pain ratin  At worst pain ratin  Quality: sharp  Relieving factors: rest  Aggravating factors: lifting, stair climbing and sitting  Progression: no change    Treatments  Current treatment: physical therapy        Objective     Palpation     Additional Palpation Details  No tenderness to palpation of pt's L adductors    Active Range of Motion   Left Hip   Flexion: 100 degrees   Abduction: 15 degrees   External rotation (90/90): 30 degrees   Internal rotation (90/90): 15 degrees     Right Hip   Flexion: 100 degrees   Abduction: 30 degrees   External rotation (90/90): 35 degrees   Internal rotation (90/90): 15 degrees     Strength/Myotome Testing  "    Left Hip   Planes of Motion   Flexion: 4-  Extension: 4-  Abduction: 4  Adduction: 3+  External rotation: 4  Internal rotation: 4    Right Hip   Planes of Motion   Flexion: 4  Extension: 4  Abduction: 4+  Adduction: 4+  External rotation: 4+  Internal rotation: 4+    Tests     Left Hip   Modified Bob: Positive.              Precautions: HTN, Hernia Repair Aug 2023    POC expires Unit limit Auth Expiration date PT/OT + Visit Limit?   7/17 3 N/A BOMN                 Visit/Unit Tracking  AUTH Status:  Date 5/22              N/A Used 1               Remaining                  FOTO      Manuals 5/22            Hip adductor stretching  FRANCIA            Hip flexor stretch FRANCIA                                      Neuro Re-Ed             Education on POC, diagnosis, and HEP 5'            SLR 2x10                                                                             Ther Ex             Hooklying fallout stretch 1x5 10\"            Hip flexor stretch c strap 5x 10\"            Hip add sque 2x10 small bolster, 2x10 small bolster hor                                                                             Ther Activity                                       Gait Training                                       Modalities                                            "

## 2024-05-29 ENCOUNTER — OFFICE VISIT (OUTPATIENT)
Dept: PHYSICAL THERAPY | Facility: CLINIC | Age: 74
End: 2024-05-29
Payer: COMMERCIAL

## 2024-05-29 DIAGNOSIS — R10.32 LEFT GROIN PAIN: Primary | ICD-10-CM

## 2024-05-29 PROCEDURE — 97140 MANUAL THERAPY 1/> REGIONS: CPT | Performed by: PHYSICAL THERAPIST

## 2024-05-29 PROCEDURE — 97112 NEUROMUSCULAR REEDUCATION: CPT | Performed by: PHYSICAL THERAPIST

## 2024-05-29 PROCEDURE — 97110 THERAPEUTIC EXERCISES: CPT | Performed by: PHYSICAL THERAPIST

## 2024-05-29 NOTE — PROGRESS NOTES
"Daily Note     Today's date: 2024  Patient name: Juan Pablo Pierre  : 1950  MRN: [id number]  Referring provider: Bird Harvey, *  Dx:   Encounter Diagnosis     ICD-10-CM    1. Left groin pain  R10.32           Start Time: 1145  Stop Time: 1230  Total time in clinic (min): 45 minutes    Subjective: Pt reports doing well after his initial evaluation, however, pt drove for many hours on Monday which increased his pain.       Objective: See treatment diary below      Assessment: Tolerated treatment well. Patient demonstrated fatigue post treatment, exhibited good technique with therapeutic exercises, and would benefit from continued PT. Pt was able to progress today with inclusion of increased hip strengthening into pt's program. Pt continues to demonstrate significant restriction in flexibility of his R hip adductor at today's session. Pt required min verbal cues to facilitate performance of proper technique. Assess pt response to treatment at next visit.      Plan: Continue per plan of care.      Precautions: HTN, Hernia Repair Aug 2023    POC expires Unit limit Auth Expiration date PT/OT + Visit Limit?    3 N/A BOMN                 Visit/Unit Tracking  AUTH Status:  Date              N/A Used 1 2              Remaining                  FOTO      Manuals            Hip adductor stretching  FRANCIA FRANCIA           Hip flexor stretch FRANCIA FRANCIA                                     Neuro Re-Ed             Pt education 5' 5'           SLR 2x10 2x10           Sidelying hip abduction  2x10           bridges  2x10           PPT  2x10 5\"                                     Ther Ex             Hooklying fallout stretch 1x5 10\" 5x 20\"           Hip flexor stretch c strap 5x 10\" 5x 20\"           Hip add sque 2x10 small bolster, 2x10 small bolster hor 2x10 small bolster, 2x10 small bolster hor           Recumbent bike for LE ROM  7'           Seated adductor stretch  5x 20\"                             "                      Ther Activity                                       Gait Training                                       Modalities

## 2024-06-12 ENCOUNTER — OFFICE VISIT (OUTPATIENT)
Dept: PHYSICAL THERAPY | Facility: CLINIC | Age: 74
End: 2024-06-12
Payer: COMMERCIAL

## 2024-06-12 DIAGNOSIS — R10.32 LEFT GROIN PAIN: Primary | ICD-10-CM

## 2024-06-12 PROCEDURE — 97110 THERAPEUTIC EXERCISES: CPT | Performed by: PHYSICAL THERAPIST

## 2024-06-12 PROCEDURE — 97112 NEUROMUSCULAR REEDUCATION: CPT | Performed by: PHYSICAL THERAPIST

## 2024-06-12 NOTE — PROGRESS NOTES
"Daily Note     Today's date: 2024  Patient name: Juan Pablo Pierre  : 1950  MRN: [id number]  Referring provider: Bird Harvey, *  Dx:   Encounter Diagnosis     ICD-10-CM    1. Left groin pain  R10.32           Start Time: 1015  Stop Time: 1101  Total time in clinic (min): 46 minutes    Subjective: Pt reports he was feeling much better with decreased pain up until this morning due to driving from [place] yesterday.       Objective: See treatment diary below      Assessment: Tolerated treatment well. Patient demonstrated fatigue post treatment, exhibited good technique with therapeutic exercises, and would benefit from continued PT. Pt continues to demonstrate significant limitations in flexibility of his L adductor muscles, however, restrictions improved after manual therapy and stretching. Pt required min verbal cues to facilitate performance of proper technique. Assess pt response to treatment at next visit.      Plan: Continue per plan of care.      Precautions: HTN, Hernia Repair Aug 2023    POC expires Unit limit Auth Expiration date PT/OT + Visit Limit?    3 N/A BOMN                 Visit/Unit Tracking  AUTH Status:  Date             N/A Used 1 2 3             Remaining                  FOTO      Manuals           Hip adductor stretching  FRANCIA FELDMAN FRANCIA          Hip flexor stretch FRANCIA FELDMAN FRANCIA                                    Neuro Re-Ed             Pt education 5' 5' 5'          SLR 2x10 2x10 2x10          Sidelying hip abduction  2x10 2x10          bridges  2x10 2x10          PPT  2x10 5\" 2x10 5\"                                    Ther Ex             Hooklying fallout stretch 1x5 10\" 5x 20\" 5x 20\"          Hip flexor stretch c strap 5x 10\" 5x 20\" 5x 20\"          Hip add sque 2x10 small bolster, 2x10 small bolster hor 2x10 small bolster, 2x10 small bolster hor 2x10 small bolster, 2x10 small bolster hor          Recumbent bike for LE ROM  7' 7'          Seated " "adductor stretch  5x 20\" 5x 20\"                                                 Ther Activity                                       Gait Training                                       Modalities                                              "

## 2024-06-19 ENCOUNTER — OFFICE VISIT (OUTPATIENT)
Dept: PHYSICAL THERAPY | Facility: CLINIC | Age: 74
End: 2024-06-19
Payer: COMMERCIAL

## 2024-06-19 DIAGNOSIS — R10.32 LEFT GROIN PAIN: Primary | ICD-10-CM

## 2024-06-19 PROCEDURE — 97112 NEUROMUSCULAR REEDUCATION: CPT | Performed by: PHYSICAL THERAPIST

## 2024-06-19 PROCEDURE — 97140 MANUAL THERAPY 1/> REGIONS: CPT | Performed by: PHYSICAL THERAPIST

## 2024-06-19 PROCEDURE — 97110 THERAPEUTIC EXERCISES: CPT | Performed by: PHYSICAL THERAPIST

## 2024-06-19 NOTE — PROGRESS NOTES
"Daily Note     Today's date: 2024  Patient name: Juan Pablo Pierre  : 1950  MRN: [id number]  Referring provider: Bird Harvey, *  Dx:   Encounter Diagnosis     ICD-10-CM    1. Left groin pain  R10.32           Start Time: 1015  Stop Time: 1100  Total time in clinic (min): 45 minutes    Subjective: Patient reports doing well after his last therapy session, however, he had increased pain when going on his boat.         Objective: See treatment diary below      Assessment: Tolerated treatment well. Patient demonstrated fatigue post treatment, exhibited good technique with therapeutic exercises, and would benefit from continued PT. Performed roller to pt's L hip adductors to reduce restrictions of that muscle and facilitate increased stretching of the area. Pt demonstrated improved flexibility after performance. Pt required min verbal cues to facilitate performance of proper technique. Assess pt response to treatment at next visit.      Plan: Continue per plan of care.      Precautions: HTN, Hernia Repair Aug 2023    POC expires Unit limit Auth Expiration date PT/OT + Visit Limit?    3 N/A BOMN                 Visit/Unit Tracking  AUTH Status:  Date            N/A Used 1 2 3 4            Remaining                  FOTO      Manuals          Hip adductor stretching  FRANCIA FRANCIA FRANCIA FRANCIA         Hip flexor stretch FRANCIA FRANCIA FRANCIA          IASTM (roller) hip adductors    FRANCIA                      Neuro Re-Ed             Pt education 5' 5' 5' 5'         SLR 2x10 2x10 2x10 2x10         Sidelying hip abduction  2x10 2x10 2x10         bridges  2x10 2x10 2x10         PPT  2x10 5\" 2x10 5\" 2x10 5\"                                   Ther Ex             Hooklying fallout stretch 1x5 10\" 5x 20\" 5x 20\" 5x 20\"         Hip flexor stretch c strap 5x 10\" 5x 20\" 5x 20\" 5x 20\"         Hip add sque 2x10 small bolster, 2x10 small bolster hor 2x10 small bolster, 2x10 small bolster hor 2x10 small " "bolster, 2x10 small bolster hor 2x10 small bolster, 2x10 small bolster hor         Recumbent bike for LE ROM  7' 7' 7'         Seated adductor stretch  5x 20\" 5x 20\" 5x 20\"                                                Ther Activity                                       Gait Training                                       Modalities                                                "

## 2024-06-26 ENCOUNTER — OFFICE VISIT (OUTPATIENT)
Dept: PHYSICAL THERAPY | Facility: CLINIC | Age: 74
End: 2024-06-26
Payer: COMMERCIAL

## 2024-06-26 DIAGNOSIS — R10.32 LEFT GROIN PAIN: Primary | ICD-10-CM

## 2024-06-26 PROCEDURE — 97110 THERAPEUTIC EXERCISES: CPT | Performed by: PHYSICAL THERAPIST

## 2024-06-26 PROCEDURE — 97112 NEUROMUSCULAR REEDUCATION: CPT | Performed by: PHYSICAL THERAPIST

## 2024-06-26 NOTE — PROGRESS NOTES
"Daily Note     Today's date: 2024  Patient name: Juan Pablo Pierre  : 1950  MRN: [id number]  Referring provider: Bird Harvey, *  Dx:   Encounter Diagnosis     ICD-10-CM    1. Left groin pain  R10.32           Start Time: 1016  Stop Time: 1102  Total time in clinic (min): 46 minutes    Subjective: Pt reports having increased pain in his L groin after his last therapy session.       Objective: See treatment diary below      Assessment: Tolerated treatment well. Patient demonstrated fatigue post treatment, exhibited good technique with therapeutic exercises, and would benefit from continued PT. Did not perform IASTM to pt's L groin today secondary to the exacerbation in pain of the area. Pt required min verbal cues to facilitate performance of proper technique. Assess pt response to treatment at next visit.      Plan: Continue per plan of care.      Precautions: HTN, Hernia Repair Aug 2023    POC expires Unit limit Auth Expiration date PT/OT + Visit Limit?    3 N/A BOMN                 Visit/Unit Tracking  AUTH Status:  Date  FOTO          N/A Used 1 2 3 4 5           Remaining                  FOTO      Manuals         Hip adductor stretching  FRANCIA FRANCIA FRANCIA FRANCIA FRANCIA        Hip flexor stretch FRANCIA FRANCIA FRANCIA          IASTM (roller) hip adductors    FRANCIA                      Neuro Re-Ed             Pt education 5' 5' 5' 5' 5'        SLR 2x10 2x10 2x10 2x10 2x10        Sidelying hip abduction  2x10 2x10 2x10 2x10        bridges  2x10 2x10 2x10 2x10        PPT  2x10 5\" 2x10 5\" 2x10 5\"                                   Ther Ex             Hooklying fallout stretch 1x5 10\" 5x 20\" 5x 20\" 5x 20\" 5x 20\"        Hip flexor stretch c strap 5x 10\" 5x 20\" 5x 20\" 5x 20\" 5x 20\"        Hip add sque 2x10 small bolster, 2x10 small bolster hor 2x10 small bolster, 2x10 small bolster hor 2x10 small bolster, 2x10 small bolster hor 2x10 small bolster, 2x10 small bolster hor 2x10 small " "xenastgloria, 2x10 small bolster hor        Recumbent bike for LE ROM  7' 7' 7' 7'        Seated adductor stretch  5x 20\" 5x 20\" 5x 20\" 5x 20\"                                               Ther Activity                                       Gait Training                                       Modalities                                                  "

## 2024-07-08 ENCOUNTER — OFFICE VISIT (OUTPATIENT)
Dept: PHYSICAL THERAPY | Facility: CLINIC | Age: 74
End: 2024-07-08
Payer: COMMERCIAL

## 2024-07-08 DIAGNOSIS — R10.32 LEFT GROIN PAIN: Primary | ICD-10-CM

## 2024-07-08 PROCEDURE — 97110 THERAPEUTIC EXERCISES: CPT | Performed by: PHYSICAL THERAPIST

## 2024-07-08 PROCEDURE — 97112 NEUROMUSCULAR REEDUCATION: CPT | Performed by: PHYSICAL THERAPIST

## 2024-07-08 PROCEDURE — 97140 MANUAL THERAPY 1/> REGIONS: CPT | Performed by: PHYSICAL THERAPIST

## 2024-07-08 NOTE — PROGRESS NOTES
"Daily Note     Today's date: 2024  Patient name: Juan Pablo Pierre  : 1950  MRN: [id number]  Referring provider: Bird Harvey, *  Dx:   Encounter Diagnosis     ICD-10-CM    1. Left groin pain  R10.32           Start Time: 1500  Stop Time: 1545  Total time in clinic (min): 45 minutes    Subjective: Pt reports doing better after his last therapy session with improved motion of his L hip.       Objective: See treatment diary below      Assessment: Tolerated treatment well. Patient demonstrated fatigue post treatment, exhibited good technique with therapeutic exercises, and would benefit from continued PT. Pt continues to demonstrate improved PROM and AROM of his L hip adductors at today's session. Pt required min verbal cues to facilitate performance of proper technique. Assess pt response to treatment at next visit.      Plan: Continue per plan of care.      Precautions: HTN, Hernia Repair Aug 2023    POC expires Unit limit Auth Expiration date PT/OT + Visit Limit?    3 N/A BOMN                 Visit/Unit Tracking  AUTH Status:  Date  FOTO 7/8         N/A Used 1 2 3 4 5 6          Remaining                  FOTO      Manuals  7/8       Hip adductor stretching  FRANCIA FRANCIA FRANCIA FRANCIA FRANCIA FRANCIA       Hip flexor stretch FRANCIA FRANCIA FRANCIA          IASTM (roller) hip adductors    FRANCIA                      Neuro Re-Ed             Pt education 5' 5' 5' 5' 5' 5'       SLR 2x10 2x10 2x10 2x10 2x10 2x10       Sidelying hip abduction  2x10 2x10 2x10 2x10 2x10       bridges  2x10 2x10 2x10 2x10 2x10       PPT  2x10 5\" 2x10 5\" 2x10 5\"                                   Ther Ex             Hooklying fallout stretch 1x5 10\" 5x 20\" 5x 20\" 5x 20\" 5x 20\" 5x 20\"       Hip flexor stretch c strap 5x 10\" 5x 20\" 5x 20\" 5x 20\" 5x 20\" 5x 20\"       Hip add sque 2x10 small bolster, 2x10 small bolster hor 2x10 small bolster, 2x10 small bolster hor 2x10 small bolster, 2x10 small bolster hor 2x10 small " "bolster, 2x10 small bolster hor 2x10 small bolster, 2x10 small bolster hor 2x10 small bolster, 2x10 small bolster hor       Recumbent bike for LE ROM  7' 7' 7' 7' 7'       Seated adductor stretch  5x 20\" 5x 20\" 5x 20\" 5x 20\" 5x 20\"                                              Ther Activity                                       Gait Training                                       Modalities                                                    "

## 2024-07-15 ENCOUNTER — OFFICE VISIT (OUTPATIENT)
Dept: PHYSICAL THERAPY | Facility: CLINIC | Age: 74
End: 2024-07-15
Payer: COMMERCIAL

## 2024-07-15 DIAGNOSIS — R10.32 LEFT GROIN PAIN: Primary | ICD-10-CM

## 2024-07-15 PROCEDURE — 97140 MANUAL THERAPY 1/> REGIONS: CPT

## 2024-07-15 PROCEDURE — 97112 NEUROMUSCULAR REEDUCATION: CPT

## 2024-07-15 PROCEDURE — 97110 THERAPEUTIC EXERCISES: CPT

## 2024-07-15 NOTE — PROGRESS NOTES
"Daily Note     Today's date: 7/15/2024  Patient name: Juan Pablo Pierre  : 1950  MRN: [id number]  Referring provider: Bird Harvey, *  Dx:   Encounter Diagnosis     ICD-10-CM    1. Left groin pain  R10.32           Start Time: 1145  Stop Time: 1227  Total time in clinic (min): 42 minutes    Subjective: Pt reports his pain hasn't been bad at all      Objective: See treatment diary below      Assessment: Tolerated treatment well. Pt demonstrates good effort throughout session. Minimal curing given to facilitate proper exercise performance and technique. He tolerates exercises without increase in sx this visit. Progressed program by adding SL leg press and minisquats this visit. Patient demonstrated fatigue post treatment, exhibited good technique with therapeutic exercises, and would benefit from continued PT      Plan: Continue per plan of care.      Precautions: HTN, Hernia Repair Aug 2023    POC expires Unit limit Auth Expiration date PT/OT + Visit Limit?    3 N/A BOMN                 Visit/Unit Tracking  AUTH Status:  Date  FOTO 7/8 7/15        N/A Used 1 2 3 4 5 6 7         Remaining                  FOTO      Manuals 5/22 5/29 6/12 6/19 6/26 7/8 7/15      Hip adductor stretching  FRANCIA FRANCIA FRANCIA FRANCIA FRANCIA FRANCIA FRANCIA      Hip flexor stretch FRANCIA FRANCIA FRANCIA          IASTM (roller) hip adductors    FRANCIA                      Neuro Re-Ed             Pt education 5' 5' 5' 5' 5' 5' 5'      SLR 2x10 2x10 2x10 2x10 2x10 2x10 2x10      Sidelying hip abduction  2x10 2x10 2x10 2x10 2x10 2x10      bridges  2x10 2x10 2x10 2x10 2x10 2x10      PPT  2x10 5\" 2x10 5\" 2x10 5\"                                   Ther Ex             Hooklying fallout stretch 1x5 10\" 5x 20\" 5x 20\" 5x 20\" 5x 20\" 5x 20\" 5x20\"      Hip flexor stretch c strap 5x 10\" 5x 20\" 5x 20\" 5x 20\" 5x 20\" 5x 20\" 5x20\"      Hip add sque 2x10 small bolster, 2x10 small bolster hor 2x10 small bolster, 2x10 small bolster hor 2x10 small bolster, 2x10 small " "bolster hor 2x10 small bolster, 2x10 small bolster hor 2x10 small bolster, 2x10 small bolster hor 2x10 small bolster, 2x10 small bolster hor 2x10 small bolster, 2x10 small bolster hor      Recumbent bike for LE ROM  7' 7' 7' 7' 7' 7'      Seated adductor stretch  5x 20\" 5x 20\" 5x 20\" 5x 20\" 5x 20\" 5x20\"      Minisquats       2x10      SL Leg press       55# 2x10                   Ther Activity                                       Gait Training                                       Modalities                                                      "

## 2024-07-22 ENCOUNTER — APPOINTMENT (OUTPATIENT)
Dept: PHYSICAL THERAPY | Facility: CLINIC | Age: 74
End: 2024-07-22
Payer: COMMERCIAL

## 2024-07-29 ENCOUNTER — APPOINTMENT (OUTPATIENT)
Dept: PHYSICAL THERAPY | Facility: CLINIC | Age: 74
End: 2024-07-29
Payer: COMMERCIAL

## 2024-08-11 ENCOUNTER — OFFICE VISIT (OUTPATIENT)
Dept: URGENT CARE | Facility: CLINIC | Age: 74
End: 2024-08-11
Payer: COMMERCIAL

## 2024-08-11 VITALS
RESPIRATION RATE: 18 BRPM | DIASTOLIC BLOOD PRESSURE: 83 MMHG | TEMPERATURE: 98.5 F | HEART RATE: 84 BPM | OXYGEN SATURATION: 96 % | SYSTOLIC BLOOD PRESSURE: 145 MMHG | BODY MASS INDEX: 30.56 KG/M2 | WEIGHT: 213 LBS

## 2024-08-11 DIAGNOSIS — U07.1 COVID-19: Primary | ICD-10-CM

## 2024-08-11 PROCEDURE — 99213 OFFICE O/P EST LOW 20 MIN: CPT | Performed by: NURSE PRACTITIONER

## 2024-08-11 NOTE — PROGRESS NOTES
[facility]        NAME: Juan Pablo Pierre is a 74 y.o. male  : 1950    MRN: [id number]  DATE: 2024  TIME: 12:08 PM    Assessment and Plan   COVID-19 [U07.1]  1. COVID-19            Vitamin D3 2000 IU daily  Vitamin C 1000mg twice per day  Multivitamin daily  Some studies suggest that Zinc 12.5-15mg every 2 hours while awake x 5 days may shorten the duration cold symptoms by 1-2 days.   Fluids and rest  Nasal saline spray; Afrin if severe congestion (do not use for more than 3 days)  Over the counter decongestant  Tylenol/Ibuprofen for pain/fever  Salt water gargles and chloraseptic spray  Throat Coat Tea  Warm compresses over sinuses  Steam treatment (utilize proper safety precautions when in contact with hot water/steam)     Patient Instructions       Follow up with PCP in 3-5 days.  Proceed to  ER if symptoms worsen.    If tests are performed, our office will contact you with results only if changes need to made to the care plan discussed with you at the visit. You can review your full results on [facility].    Chief Complaint     Chief Complaint   Patient presents with    COVID-19     Pt tested positive for covid today with symptoms started yesterday with cough, fever, achy.         History of Present Illness       URI   This is a new problem. The current episode started yesterday. The problem has been waxing and waning. The maximum temperature recorded prior to his arrival was 101 - 101.9 F. The fever has been present for Less than 1 day. Associated symptoms include coughing, headaches (from coughing), rhinorrhea and a sore throat. Pertinent negatives include no chest pain, congestion, dysuria, nausea, neck pain, plugged ear sensation, sinus pain, sneezing, vomiting or wheezing. He has tried acetaminophen for the symptoms. The treatment provided mild relief.       Review of Systems   Review of Systems   Constitutional:  Positive for chills, diaphoresis and fever. Negative for  appetite change and fatigue.   HENT:  Positive for rhinorrhea and sore throat. Negative for congestion, sinus pain and sneezing.    Respiratory:  Positive for cough. Negative for wheezing.    Cardiovascular:  Negative for chest pain.   Gastrointestinal:  Negative for nausea and vomiting.   Genitourinary:  Negative for dysuria.   Musculoskeletal:  Negative for neck pain.   Neurological:  Positive for headaches (from coughing). Negative for dizziness and weakness.         Current Medications       Current Outpatient Medications:     B Complex Vitamins (B COMPLEX 1 PO), , Disp: , Rfl:     losartan (COZAAR) 50 mg tablet, TAKE 1 TABLET DAILY, Disp: 90 tablet, Rfl: 3    tamsulosin (FLOMAX) 0.4 mg, TAKE 1 CAPSULE DAILY WITH DINNER, Disp: 90 capsule, Rfl: 3    Vitamin D-Vitamin K (K2-D3 5000 PO), Take by mouth, Disp: , Rfl:     Cholecalciferol (VITAMIN D3) 5000 units CAPS, daily  (Patient not taking: Reported on 5/13/2024), Disp: , Rfl:     cyclobenzaprine (FLEXERIL) 5 mg tablet, Take 1 tablet (5 mg total) by mouth 3 (three) times a day as needed for muscle spasms (Patient not taking: Reported on 8/11/2024), Disp: 30 tablet, Rfl: 0    thiamine (VITAMIN B1) 50 mg tablet, Take 50 mg by mouth daily (Patient not taking: Reported on 5/13/2024), Disp: , Rfl:     TiZANidine (ZANAFLEX) 2 MG capsule, Take 1 capsule (2 mg total) by mouth 3 (three) times a day (Patient not taking: Reported on 8/11/2024), Disp: 30 capsule, Rfl: 0    Current Allergies     Allergies as of 08/11/2024    (No Known Allergies)            The following portions of the patient's history were reviewed and updated as appropriate: allergies, current medications, past family history, past medical history, past social history, past surgical history and problem list.     Past Medical History:   Diagnosis Date    Arthritis of carpometacarpal (CMC) joint of thumb     Bundle branch block, right     Complex tear of lateral meniscus of right knee 05/15/2019    Added  automatically from request for surgery [id number]    Dupuytren's contracture     Elevated PSA     GERD (gastroesophageal reflux disease) 2010    Hypertension     Left inguinal hernia     Nocturnal hypoxemia 11/20/2017    JAY (obstructive sleep apnea) 3/11/2024    Polycythemia     Thrombocytopenia (HCC)        Past Surgical History:   Procedure Laterality Date    APPENDECTOMY      COLOGUARD (HISTORICAL)      EAR SURGERY Right     EYE SURGERY Right     lump removed from eyelid    HERNIA REPAIR Left 08/16/2023    Procedure: REPAIR HERNIA INGUINAL - Open with Mesh;  Surgeon: Bird Harvey DO;  Location: [facility];  Service: General    KNEE SURGERY Left     arthroscopy    NH ARTHROSCOPY KNEE W/MENISCUS RPR MEDIAL&LATERAL Right 05/23/2019    Procedure: KNEE ARTHROSCOPIC MEDIAL AND LATERAL MENISCAL REPAIR; REMOVAL LOOSE BODY; CORTISONE INJECTION;  Surgeon: Kimberlyn Mccracken MD;  Location: [facility];  Service: Orthopedics    PROSTATE BIOPSY      prostate punch       Family History   Problem Relation Age of Onset    Cancer Father         Lung cancer-smoker    No Known Problems Mother     Hypertension Family          Medications have been verified.        Objective   /83   Pulse 84   Temp 98.5 °F (36.9 °C) (Tympanic)   Resp 18   Wt 96.6 kg (213 lb)   SpO2 96%   BMI 30.56 kg/m²        Physical Exam     Physical Exam  Vitals and nursing note reviewed.   Constitutional:       General: He is not in acute distress.     Appearance: Normal appearance. He is not ill-appearing.   HENT:      Head: Normocephalic and atraumatic.      Right Ear: Tympanic membrane, ear canal and external ear normal.      Left Ear: Tympanic membrane, ear canal and external ear normal.      Nose: Rhinorrhea present. No congestion.   Eyes:      Pupils: Pupils are equal, round, and reactive to light.   Cardiovascular:      Rate and Rhythm: Normal rate and regular rhythm.      Pulses: Normal pulses.      Heart sounds: Normal heart sounds.    Pulmonary:      Effort: Pulmonary effort is normal.      Breath sounds: Normal breath sounds.   Musculoskeletal:      Cervical back: Normal range of motion and neck supple.   Skin:     General: Skin is warm and dry.   Neurological:      Mental Status: He is alert.

## 2024-08-11 NOTE — PATIENT INSTRUCTIONS
Vitamin D3 2000 IU daily  Vitamin C 1000mg twice per day  Multivitamin daily  Some studies suggest that Zinc 12.5-15mg every 2 hours while awake x 5 days may shorten the duration cold symptoms by 1-2 days.   Fluids and rest  Nasal saline spray; Afrin if severe congestion (do not use for more than 3 days)  Over the counter decongestant  Tylenol/Ibuprofen for pain/fever  Salt water gargles and chloraseptic spray  Throat Coat Tea  Warm compresses over sinuses  Steam treatment (utilize proper safety precautions when in contact with hot water/steam)

## 2024-12-05 ENCOUNTER — TELEPHONE (OUTPATIENT)
Age: 74
End: 2024-12-05

## 2024-12-05 NOTE — TELEPHONE ENCOUNTER
Fabiola from the peps calling to transfer the patients wife who is trying to schedule an a appointment for her . Patient has been set up for 12/17/ at 1pm

## 2024-12-10 ENCOUNTER — HOSPITAL ENCOUNTER (OUTPATIENT)
Dept: CT IMAGING | Facility: HOSPITAL | Age: 74
Discharge: HOME | End: 2024-12-10
Payer: COMMERCIAL

## 2024-12-10 DIAGNOSIS — Z91.89 OTHER SPECIFIED PERSONAL RISK FACTORS, NOT ELSEWHERE CLASSIFIED: ICD-10-CM

## 2024-12-10 PROCEDURE — 75571 CT HRT W/O DYE W/CA TEST: CPT

## 2024-12-17 ENCOUNTER — OFFICE VISIT (OUTPATIENT)
Age: 74
End: 2024-12-17
Payer: COMMERCIAL

## 2024-12-17 VITALS
HEIGHT: 70 IN | DIASTOLIC BLOOD PRESSURE: 76 MMHG | WEIGHT: 215 LBS | HEART RATE: 68 BPM | OXYGEN SATURATION: 97 % | BODY MASS INDEX: 30.78 KG/M2 | SYSTOLIC BLOOD PRESSURE: 138 MMHG

## 2024-12-17 DIAGNOSIS — Z13.220 SCREENING FOR LIPID DISORDERS: ICD-10-CM

## 2024-12-17 DIAGNOSIS — R10.32 LEFT GROIN PAIN: ICD-10-CM

## 2024-12-17 DIAGNOSIS — K44.9 HIATAL HERNIA: ICD-10-CM

## 2024-12-17 DIAGNOSIS — I45.10 RIGHT BUNDLE BRANCH BLOCK (RBBB): ICD-10-CM

## 2024-12-17 DIAGNOSIS — R73.01 IMPAIRED FASTING GLUCOSE: ICD-10-CM

## 2024-12-17 DIAGNOSIS — I10 ESSENTIAL HYPERTENSION: Primary | ICD-10-CM

## 2024-12-17 DIAGNOSIS — G47.33 OSA (OBSTRUCTIVE SLEEP APNEA): ICD-10-CM

## 2024-12-17 DIAGNOSIS — R35.1 BPH ASSOCIATED WITH NOCTURIA: ICD-10-CM

## 2024-12-17 DIAGNOSIS — R53.83 FATIGUE, UNSPECIFIED TYPE: ICD-10-CM

## 2024-12-17 DIAGNOSIS — N40.1 BPH ASSOCIATED WITH NOCTURIA: ICD-10-CM

## 2024-12-17 PROCEDURE — 99204 OFFICE O/P NEW MOD 45 MIN: CPT

## 2024-12-17 NOTE — ASSESSMENT & PLAN NOTE
BP stable 138/76 in office. States he checks it at home and it is normally 120s/70-80. Continue on current regimen.

## 2024-12-17 NOTE — PROGRESS NOTES
Name: Juan Pablo Pierre      : 1950      MRN: [id number]  Encounter Provider: Lenore Rainey PA-C  Encounter Date: 2024   Encounter department: [facility] [address]  :  Assessment & Plan  Essential hypertension  BP stable 138/76 in office. States he checks it at home and it is normally 120s/70-80. Continue on current regimen.        BPH associated with nocturia  Continue with current regimen. Stable at the moment. Recently seen by Urology in 2024.        Right bundle branch block (RBBB)  Recent echo on 24. LVEF was 67%. Denies any current symptoms.        JAY (obstructive sleep apnea)  Doesn't have CPAP, no issues with sleep.        Left groin pain  Saw Dr. Reed from General Surgery 24. See note from 24. Given Flexeril for PRN pain but states it didn't help. Said the pain is manageable and is only happening in mild flares. Discussed keeping an eye on if the pain becomes unbearable and we could consider further interventions.        Hiatal hernia  Recent CT coronary calcium score on 12/10/24 imaging showed small to moderate sliding type hiatal hernia as well as thickening of the esophagus suggestive of esophagitis. Patient endorses occasional bouts of heartburn but states tums helps. Discussed with him if Tums do not appear to work to mitigate the symptoms, we can trial PPI therapy. Denies any alarm symptoms that would indicate GI consult or further imaging.               History of Present Illness     Patient is a 74-year-old male presenting to the clinic to establish care.  Was recently followed by [facility] where he got routine care.  Says he would like to establish with [facility] because his wife is seen here as well.  States he is doing well other than his left groin pain that he has been having ever since his hernia surgery on 2023.  He was seen by [facility] general surgery and evaluated.  He states they thought it was most likely  "scar tissue from the hernia surgery that was causing his nerve pain in his left groin region.  States it is manageable at this time.      Review of Systems   Constitutional:  Negative for chills, fatigue and fever.   HENT:  Negative for ear discharge, ear pain, postnasal drip, rhinorrhea, sinus pressure, sinus pain, sore throat, tinnitus and trouble swallowing.    Eyes:  Negative for pain, discharge and itching.   Respiratory:  Negative for cough, shortness of breath and wheezing.    Cardiovascular:  Negative for chest pain, palpitations and leg swelling.   Gastrointestinal:  Negative for abdominal pain, constipation, diarrhea, nausea and vomiting.   Endocrine: Negative for polydipsia, polyphagia and polyuria.   Genitourinary:  Negative for difficulty urinating, frequency, hematuria and urgency.   Musculoskeletal:  Negative for arthralgias, joint swelling and myalgias.   Skin:  Negative for color change.   Allergic/Immunologic: Negative for environmental allergies.   Neurological:  Negative for dizziness, weakness, light-headedness, numbness and headaches.   Hematological:  Negative for adenopathy.   Psychiatric/Behavioral:  Negative for decreased concentration and sleep disturbance. The patient is not nervous/anxious.        Objective   /76   Pulse 68   Ht 5' 10\" (1.778 m)   Wt 97.5 kg (215 lb)   SpO2 97%   BMI 30.85 kg/m²      Physical Exam  Vitals and nursing note reviewed.   Constitutional:       General: He is awake.      Appearance: Normal appearance. He is well-developed, well-groomed and overweight.   HENT:      Head: Normocephalic and atraumatic.      Right Ear: Hearing and external ear normal.      Left Ear: Hearing and external ear normal.      Nose: Nose normal.      Mouth/Throat:      Lips: Pink.      Mouth: Mucous membranes are moist.   Eyes:      General: Lids are normal. Vision grossly intact. Gaze aligned appropriately.      Conjunctiva/sclera: Conjunctivae normal.   Neck:      Vascular: " No carotid bruit.      Trachea: Trachea and phonation normal.   Cardiovascular:      Rate and Rhythm: Normal rate and regular rhythm.      Heart sounds: Normal heart sounds, S1 normal and S2 normal. No murmur heard.     No friction rub. No gallop.   Pulmonary:      Effort: Pulmonary effort is normal.      Breath sounds: Normal breath sounds and air entry. No decreased breath sounds, wheezing, rhonchi or rales.   Abdominal:      General: Abdomen is protuberant.      Palpations: Abdomen is soft.   Musculoskeletal:         General: Normal range of motion.      Cervical back: Neck supple.      Right lower leg: No edema.      Left lower leg: No edema.   Skin:     General: Skin is warm.      Capillary Refill: Capillary refill takes less than 2 seconds.   Neurological:      Mental Status: He is alert.   Psychiatric:         Attention and Perception: Attention and perception normal.         Mood and Affect: Mood and affect normal.         Speech: Speech normal.         Behavior: Behavior normal. Behavior is cooperative.         Thought Content: Thought content normal.         Cognition and Memory: Cognition and memory normal.         Judgment: Judgment normal.

## 2024-12-17 NOTE — ASSESSMENT & PLAN NOTE
Saw Dr. Reed from General Surgery 5/13/24. See note from 5/13/24. Given Flexeril for PRN pain but states it didn't help. Said the pain is manageable and is only happening in mild flares. Discussed keeping an eye on if the pain becomes unbearable and we could consider further interventions.

## 2024-12-17 NOTE — ASSESSMENT & PLAN NOTE
Continue with current regimen. Stable at the moment. Recently seen by Urology in March 2024.

## 2025-04-01 ENCOUNTER — RA CDI HCC (OUTPATIENT)
Dept: OTHER | Facility: HOSPITAL | Age: 75
End: 2025-04-01

## 2025-04-07 DIAGNOSIS — N40.1 BPH ASSOCIATED WITH NOCTURIA: ICD-10-CM

## 2025-04-07 DIAGNOSIS — R35.1 BPH ASSOCIATED WITH NOCTURIA: ICD-10-CM

## 2025-04-07 DIAGNOSIS — I10 BENIGN ESSENTIAL HYPERTENSION: Chronic | ICD-10-CM

## 2025-04-07 RX ORDER — LOSARTAN POTASSIUM 50 MG/1
50 TABLET ORAL DAILY
Qty: 90 TABLET | Refills: 1 | Status: SHIPPED | OUTPATIENT
Start: 2025-04-07

## 2025-04-07 RX ORDER — TAMSULOSIN HYDROCHLORIDE 0.4 MG/1
0.4 CAPSULE ORAL
Qty: 90 CAPSULE | Refills: 0 | Status: SHIPPED | OUTPATIENT
Start: 2025-04-07

## 2025-04-07 NOTE — TELEPHONE ENCOUNTER
Refill must be reviewed and completed by the office or provider. The refill is unable to be approved or denied by the medication management team.        Reason for call:   [ ] Refill   [ ] Prior Auth  [ ] Other:     Office:   [ ] PCP/Provider -  Internal Medicine [facility] - Jesus Isidro MD - [facility]   [ ] Specialty/Provider -     Medication: tamsulosin (FLOMAX) 0.4 mg    Dose/Frequency: TAKE 1 CAPSULE DAILY WITH DINNER    Medication: losartan (COZAAR) 50 mg tablet    Dose/Frequency:       Sig: TAKE 1 TABLET DAILY        Quantity: 90    Pharmacy: [facility]       Mail Away Pharmacy   Does the patient have enough for 10 days?   [ ] Yes   [ ] No - Send as HP to POD

## 2025-04-22 PROBLEM — D69.3 CHRONIC ITP (IDIOPATHIC THROMBOCYTOPENIA) (HCC): Status: ACTIVE | Noted: 2025-04-22

## 2025-04-23 ENCOUNTER — APPOINTMENT (OUTPATIENT)
Dept: LAB | Facility: CLINIC | Age: 75
End: 2025-04-23
Payer: COMMERCIAL

## 2025-04-23 LAB
ALBUMIN SERPL BCG-MCNC: 4.4 G/DL (ref 3.5–5)
ALP SERPL-CCNC: 85 U/L (ref 34–104)
ALT SERPL W P-5'-P-CCNC: 20 U/L (ref 7–52)
ANION GAP SERPL CALCULATED.3IONS-SCNC: 8 MMOL/L (ref 4–13)
AST SERPL W P-5'-P-CCNC: 20 U/L (ref 13–39)
BASOPHILS # BLD AUTO: 0.05 THOUSANDS/ÂΜL (ref 0–0.1)
BASOPHILS NFR BLD AUTO: 1 % (ref 0–1)
BILIRUB SERPL-MCNC: 0.93 MG/DL (ref 0.2–1)
BUN SERPL-MCNC: 18 MG/DL (ref 5–25)
CALCIUM SERPL-MCNC: 9.7 MG/DL (ref 8.4–10.2)
CHLORIDE SERPL-SCNC: 104 MMOL/L (ref 96–108)
CHOLEST SERPL-MCNC: 183 MG/DL (ref ?–200)
CO2 SERPL-SCNC: 28 MMOL/L (ref 21–32)
CREAT SERPL-MCNC: 0.99 MG/DL (ref 0.6–1.3)
EOSINOPHIL # BLD AUTO: 0.06 THOUSAND/ÂΜL (ref 0–0.61)
EOSINOPHIL NFR BLD AUTO: 1 % (ref 0–6)
ERYTHROCYTE [DISTWIDTH] IN BLOOD BY AUTOMATED COUNT: 14.3 % (ref 11.6–15.1)
EST. AVERAGE GLUCOSE BLD GHB EST-MCNC: 114 MG/DL
GFR SERPL CREATININE-BSD FRML MDRD: 74 ML/MIN/1.73SQ M
GLUCOSE P FAST SERPL-MCNC: 85 MG/DL (ref 65–99)
HBA1C MFR BLD: 5.6 %
HCT VFR BLD AUTO: 54.4 % (ref 36.5–49.3)
HDLC SERPL-MCNC: 39 MG/DL
HGB BLD-MCNC: 17.3 G/DL (ref 12–17)
IMM GRANULOCYTES # BLD AUTO: 0.02 THOUSAND/UL (ref 0–0.2)
IMM GRANULOCYTES NFR BLD AUTO: 0 % (ref 0–2)
LDLC SERPL CALC-MCNC: 121 MG/DL (ref 0–100)
LYMPHOCYTES # BLD AUTO: 1.88 THOUSANDS/ÂΜL (ref 0.6–4.47)
LYMPHOCYTES NFR BLD AUTO: 27 % (ref 14–44)
MCH RBC QN AUTO: 28.5 PG (ref 26.8–34.3)
MCHC RBC AUTO-ENTMCNC: 31.2 G/DL (ref 31.4–37.4)
MCV RBC AUTO: 91 FL (ref 82–98)
MONOCYTES # BLD AUTO: 0.63 THOUSAND/ÂΜL (ref 0.17–1.22)
MONOCYTES NFR BLD AUTO: 9 % (ref 4–12)
NEUTROPHILS # BLD AUTO: 4.3 THOUSANDS/ÂΜL (ref 1.85–7.62)
NEUTS SEG NFR BLD AUTO: 62 % (ref 43–75)
NONHDLC SERPL-MCNC: 144 MG/DL
NRBC BLD AUTO-RTO: 0 /100 WBCS
PLATELET # BLD AUTO: 131 THOUSANDS/UL (ref 149–390)
PMV BLD AUTO: 12.5 FL (ref 8.9–12.7)
POTASSIUM SERPL-SCNC: 5.2 MMOL/L (ref 3.5–5.3)
PROT SERPL-MCNC: 7.4 G/DL (ref 6.4–8.4)
RBC # BLD AUTO: 6.08 MILLION/UL (ref 3.88–5.62)
SODIUM SERPL-SCNC: 140 MMOL/L (ref 135–147)
TRIGL SERPL-MCNC: 115 MG/DL (ref ?–150)
TSH SERPL DL<=0.05 MIU/L-ACNC: 1.75 UIU/ML (ref 0.45–4.5)
WBC # BLD AUTO: 6.94 THOUSAND/UL (ref 4.31–10.16)

## 2025-04-24 ENCOUNTER — RESULTS FOLLOW-UP (OUTPATIENT)
Age: 75
End: 2025-04-24

## 2025-04-28 ENCOUNTER — OFFICE VISIT (OUTPATIENT)
Dept: GASTROENTEROLOGY | Facility: AMBULARY SURGERY CENTER | Age: 75
End: 2025-04-28
Payer: COMMERCIAL

## 2025-04-28 VITALS
DIASTOLIC BLOOD PRESSURE: 88 MMHG | HEART RATE: 113 BPM | HEIGHT: 70 IN | BODY MASS INDEX: 30.92 KG/M2 | SYSTOLIC BLOOD PRESSURE: 142 MMHG | WEIGHT: 216 LBS | OXYGEN SATURATION: 96 %

## 2025-04-28 DIAGNOSIS — R19.5 POSITIVE COLORECTAL CANCER SCREENING USING COLOGUARD TEST: Primary | ICD-10-CM

## 2025-04-28 DIAGNOSIS — D69.3 CHRONIC ITP (IDIOPATHIC THROMBOCYTOPENIA) (HCC): ICD-10-CM

## 2025-04-28 DIAGNOSIS — R19.09 INGUINAL BULGE: ICD-10-CM

## 2025-04-28 PROCEDURE — 99204 OFFICE O/P NEW MOD 45 MIN: CPT | Performed by: INTERNAL MEDICINE

## 2025-04-28 RX ORDER — SODIUM CHLORIDE, SODIUM LACTATE, POTASSIUM CHLORIDE, CALCIUM CHLORIDE 600; 310; 30; 20 MG/100ML; MG/100ML; MG/100ML; MG/100ML
125 INJECTION, SOLUTION INTRAVENOUS CONTINUOUS
OUTPATIENT
Start: 2025-04-28

## 2025-04-28 RX ORDER — UBIDECARENONE 30 MG
100 CAPSULE ORAL DAILY
COMMUNITY

## 2025-04-28 NOTE — PATIENT INSTRUCTIONS
Scheduled date of colonoscopy (as of today): May 29, 2025  Physician performing colonoscopy: Dr. Castro   Location of colonoscopy:  [facility]   Bowel prep reviewed with patient: katja/dulc   Instructions reviewed with patient by: LINK   Clearances: n/a

## 2025-04-28 NOTE — PROGRESS NOTES
Name: Juan Pablo Pierre      : 1950      MRN: [id number]  Encounter Provider: Elida Castro MD  Encounter Date: 2025   Encounter department: [facility] [place]  :  Assessment & Plan  Positive colorectal cancer screening using Cologuard test  - No previous colonoscopy, recently tested positive for Cologuard.  -Will schedule colonoscopy and today's visit.  -Recommend MiraLAX/Dulcolax bowel prep.  -Most recent platelets of 131.  Historically platelets have been above 100,000.  -Patient was explained about the risks and benefits of the procedure. Risks including but not limited to bleeding, infection, perforation were explained in detail. Also explained about less than 100% sensitivity with the exam and other alternatives.  -Labs reviewed, TSH was normal, A1c is normal.  CMP is normal.  Orders:    Colonoscopy; Future    Chronic ITP (idiopathic thrombocytopenia) (HCC)  - Continue to follow-up with hematology.       Inguinal bulge  - Patient reports intermittent left inguinal pain, CAT scan from 1 year ago was personally reviewed, there was no evidence of recurrent left hernia repair however on exam there was a small bulge in the left inguinal area with mild tenderness on palpation.  Would recommend repeating CT of abdomen and pelvis with contrast.  Depending on this, we will refer back to surgery.    Orders:    CT abdomen pelvis w contrast; Future    CT of abdomen and pelvis was reviewed.  CT from May 2024 notable for epiploic appendagitis.  Images were personally reviewed in [facility] with the patient.    History of Present Illness   Juan Pablo Pierre is a 74 y.o. male with history of hypertension, right bundle branch block, JAY, chronic ITP, presents for colon cancer screening evaluation after testing positive for Cologuard.  Patient denies any change in bowel habits, hematochezia, abdominal pain or unintentional weight loss.  Patient has never had a colonoscopy.  He denies any  "family history of colon cancer.  Denies any symptoms of acid reflux, dysphagia, loss of appetite or early satiety.  He does report pain in the left inguinal region which is sharp and occurs intermittently.  Patient reports that despite having hernia repaired he has had ongoing symptoms in the left inguinal region.  I personally reviewed CAT scan images from 1 year ago with the patient.  Labs were personally reviewed.  HPI    Review of Systems A complete review of systems is negative other than that noted above in the HPI.         Objective   /88 (BP Location: Left arm, Patient Position: Sitting, Cuff Size: Standard)   Pulse (!) 113   Ht 5' 10\" (1.778 m)   Wt 98 kg (216 lb)   SpO2 96%   BMI 30.99 kg/m²     Physical Exam  Vitals and nursing note reviewed.   Constitutional:       General: He is not in acute distress.     Appearance: He is well-developed.   HENT:      Head: Normocephalic and atraumatic.   Eyes:      Conjunctiva/sclera: Conjunctivae normal.   Cardiovascular:      Rate and Rhythm: Normal rate and regular rhythm.      Heart sounds: No murmur heard.  Pulmonary:      Effort: Pulmonary effort is normal. No respiratory distress.      Breath sounds: Normal breath sounds.   Abdominal:      Palpations: Abdomen is soft.      Tenderness: There is no abdominal tenderness.      Hernia: A hernia is present. Hernia is present in the left inguinal area.   Musculoskeletal:         General: No swelling.      Cervical back: Neck supple.   Skin:     General: Skin is warm and dry.   Neurological:      Mental Status: He is alert.   Psychiatric:         Mood and Affect: Mood normal.           Lab Results: I personally reviewed relevant lab results.             " Circumcision, per parent request

## 2025-04-29 ENCOUNTER — TELEPHONE (OUTPATIENT)
Age: 75
End: 2025-04-29

## 2025-04-29 NOTE — TELEPHONE ENCOUNTER
Patients GI provider:  Dr. Castro    Number to return call: [phone]    Reason for call: Pt'w wife calling to inquire why barium is included in this CT order, pt previously did not have to drink barium in addition to contrast. Was in the process of transferring wife to triage when call ended. Please call wife back to advise.    Scheduled procedure/appointment date if applicable: procedure 05/29/2025

## 2025-04-29 NOTE — TELEPHONE ENCOUNTER
Please let patient and wife know that CT scan with both IV and oral contrast provide the best examination of the abdomen and intestine and would be good to proceed with both

## 2025-05-15 ENCOUNTER — ANESTHESIA (OUTPATIENT)
Dept: ANESTHESIOLOGY | Facility: HOSPITAL | Age: 75
End: 2025-05-15

## 2025-05-15 ENCOUNTER — ANESTHESIA EVENT (OUTPATIENT)
Dept: ANESTHESIOLOGY | Facility: HOSPITAL | Age: 75
End: 2025-05-15

## 2025-05-29 ENCOUNTER — ANESTHESIA (OUTPATIENT)
Dept: GASTROENTEROLOGY | Facility: AMBULARY SURGERY CENTER | Age: 75
End: 2025-05-29
Payer: COMMERCIAL

## 2025-05-29 ENCOUNTER — HOSPITAL ENCOUNTER (OUTPATIENT)
Dept: GASTROENTEROLOGY | Facility: AMBULARY SURGERY CENTER | Age: 75
Setting detail: OUTPATIENT SURGERY
Discharge: HOME | End: 2025-05-29
Attending: INTERNAL MEDICINE
Payer: COMMERCIAL

## 2025-05-29 VITALS
HEART RATE: 75 BPM | DIASTOLIC BLOOD PRESSURE: 77 MMHG | RESPIRATION RATE: 18 BRPM | SYSTOLIC BLOOD PRESSURE: 146 MMHG | TEMPERATURE: 97.7 F | OXYGEN SATURATION: 98 %

## 2025-05-29 DIAGNOSIS — R19.5 POSITIVE COLORECTAL CANCER SCREENING USING COLOGUARD TEST: ICD-10-CM

## 2025-05-29 PROCEDURE — 88305 TISSUE EXAM BY PATHOLOGIST: CPT | Performed by: PATHOLOGY

## 2025-05-29 PROCEDURE — 45381 COLONOSCOPY SUBMUCOUS NJX: CPT | Performed by: INTERNAL MEDICINE

## 2025-05-29 RX ORDER — SODIUM CHLORIDE, SODIUM LACTATE, POTASSIUM CHLORIDE, CALCIUM CHLORIDE 600; 310; 30; 20 MG/100ML; MG/100ML; MG/100ML; MG/100ML
125 INJECTION, SOLUTION INTRAVENOUS CONTINUOUS
Status: DISCONTINUED | OUTPATIENT
Start: 2025-05-29 | End: 2025-06-02 | Stop reason: HOSPADM

## 2025-05-29 RX ORDER — PROPOFOL 10 MG/ML
INJECTION, EMULSION INTRAVENOUS AS NEEDED
Status: DISCONTINUED | OUTPATIENT
Start: 2025-05-29 | End: 2025-05-29

## 2025-05-29 RX ORDER — LIDOCAINE HYDROCHLORIDE 10 MG/ML
INJECTION, SOLUTION EPIDURAL; INFILTRATION; INTRACAUDAL; PERINEURAL AS NEEDED
Status: DISCONTINUED | OUTPATIENT
Start: 2025-05-29 | End: 2025-05-29

## 2025-05-29 RX ORDER — SODIUM CHLORIDE, SODIUM LACTATE, POTASSIUM CHLORIDE, CALCIUM CHLORIDE 600; 310; 30; 20 MG/100ML; MG/100ML; MG/100ML; MG/100ML
INJECTION, SOLUTION INTRAVENOUS CONTINUOUS PRN
Status: DISCONTINUED | OUTPATIENT
Start: 2025-05-29 | End: 2025-05-29

## 2025-05-29 RX ORDER — PROPOFOL 10 MG/ML
INJECTION, EMULSION INTRAVENOUS CONTINUOUS PRN
Status: DISCONTINUED | OUTPATIENT
Start: 2025-05-29 | End: 2025-05-29

## 2025-05-29 RX ADMIN — LIDOCAINE HYDROCHLORIDE 50 MG: 10 INJECTION, SOLUTION EPIDURAL; INFILTRATION; INTRACAUDAL; PERINEURAL at 08:50

## 2025-05-29 RX ADMIN — SODIUM CHLORIDE, SODIUM LACTATE, POTASSIUM CHLORIDE, AND CALCIUM CHLORIDE: .6; .31; .03; .02 INJECTION, SOLUTION INTRAVENOUS at 08:42

## 2025-05-29 RX ADMIN — PROPOFOL 30 MG: 10 INJECTION, EMULSION INTRAVENOUS at 08:51

## 2025-05-29 RX ADMIN — PROPOFOL 100 MG: 10 INJECTION, EMULSION INTRAVENOUS at 08:50

## 2025-05-29 RX ADMIN — PROPOFOL 140 MCG/KG/MIN: 10 INJECTION, EMULSION INTRAVENOUS at 08:51

## 2025-05-29 NOTE — ANESTHESIA PREPROCEDURE EVALUATION
Procedure:  COLONOSCOPY    Relevant Problems   CARDIO   (+) Essential hypertension   (+) Right bundle branch block (RBBB)      HEMATOLOGY   (+) Chronic ITP (idiopathic thrombocytopenia) (HCC)      PULMONARY   (+) JAY (obstructive sleep apnea)        Physical Exam    Airway     Mallampati score: II  TM Distance: >3 FB  Neck ROM: full  Upper bite lip test: I  Mouth opening: >= 4 cm      Cardiovascular  Cardiovascular exam normal    Dental   No notable dental hx     Pulmonary  Pulmonary exam normal     Neurological  - normal exam    Other Findings        Anesthesia Plan  ASA Score- 2     Anesthesia Type- IV sedation with anesthesia with ASA Monitors.         Additional Monitors:     Airway Plan:            Plan Factors-Exercise tolerance (METS): >4 METS.    Chart reviewed.    Patient summary reviewed.    Patient is not a current smoker.              Induction-     Postoperative Plan- .   Monitoring Plan - Monitoring plan - standard ASA monitoring      Perioperative Resuscitation Plan - Level 1 - Full Code.       Informed Consent- Anesthetic plan and risks discussed with patient.  I personally reviewed this patient with the CRNA. Discussed and agreed on the Anesthesia Plan with the CRNA..      NPO Status:  Vitals Value Taken Time   Date of last liquid 05/29/25 05/29/25 07:34   Time of last liquid 0230 05/29/25 07:34   Date of last solid 05/27/25 05/29/25 07:34   Time of last solid

## 2025-05-29 NOTE — ANESTHESIA POSTPROCEDURE EVALUATION
Post-Op Assessment Note    CV Status:  Stable  Pain Score: 0    Pain management: adequate       Mental Status:  Sleepy   Hydration Status:  Stable   PONV Controlled:  None   Airway Patency:  Patent  Two or more mitigation strategies used for obstructive sleep apnea   Post Op Vitals Reviewed: Yes    No anethesia notable event occurred.    Staff: CRNA           Last Filed PACU Vitals:  Vitals Value Taken Time   Temp     Pulse 81    /75    Resp 16    SpO2 99

## 2025-05-29 NOTE — H&P
History and Physical -  [facility]  Juan Pablo Pierre 74 y.o. male MRN: [id number]    HPI: Juan Pablo Pierre is a 74 y.o. year old male who presents for colon cancer screening, has had positive Cologuard.      Review of Systems    Historical Information   Past Medical History[1]  Past Surgical History[2]  Social History   Social History     Substance and Sexual Activity   Alcohol Use Not Currently     Social History     Substance and Sexual Activity   Drug Use No     Tobacco Use History[3]  Family History[4]    Meds/Allergies     Not in a hospital admission.    Allergies[5]    Objective     /78   Pulse 80   Temp 97.7 °F (36.5 °C) (Temporal)   Resp 18   SpO2 98%       PHYSICAL EXAM    Gen: NAD  CV: RRR  CHEST: Clear  ABD: soft, NT/ND  EXT: no edema  Neuro: AAO      ASSESSMENT/PLAN:  This is a 74 y.o. year old male here for colon cancer screening, has had positive Cologuard in the past.  Patient was explained about the risks and benefits of the procedure. Risks including but not limited to bleeding, infection, perforation were explained in detail.     PLAN:   Procedure: Colonoscopy           [1]   Past Medical History:  Diagnosis Date    Arthritis of carpometacarpal (CMC) joint of thumb     Bundle branch block, right     Complex tear of lateral meniscus of right knee 05/15/2019    Added automatically from request for surgery [id number]    Dupuytren's contracture     Elevated PSA     GERD (gastroesophageal reflux disease) 2010    Hypertension     Left inguinal hernia 7/31/2023    Nocturnal hypoxemia 11/20/2017    JAY (obstructive sleep apnea) 3/11/2024    Polycythemia     Thrombocytopenia (HCC)    [2]   Past Surgical History:  Procedure Laterality Date    APPENDECTOMY      COLOGUARD (HISTORICAL)      EAR SURGERY Right     EYE SURGERY Right     lump removed from eyelid    HERNIA REPAIR Left 08/16/2023    Procedure: REPAIR HERNIA INGUINAL - Open with Mesh;  Surgeon: Bird Harvey DO;   Location: [facility];  Service: General    KNEE SURGERY Left     arthroscopy    NE ARTHROSCOPY KNEE W/MENISCUS RPR MEDIAL&LATERAL Right 05/23/2019    Procedure: KNEE ARTHROSCOPIC MEDIAL AND LATERAL MENISCAL REPAIR; REMOVAL LOOSE BODY; CORTISONE INJECTION;  Surgeon: Kimberlyn Mccracken MD;  Location: [facility];  Service: Orthopedics    PROSTATE BIOPSY      prostate punch   [3]   Social History  Tobacco Use   Smoking Status Never    Passive exposure: Past   Smokeless Tobacco Never   [4]   Family History  Problem Relation Name Age of Onset    Cancer Father Josiah Almeida         Lung cancer-smoker    No Known Problems Mother      Hypertension Family     [5] No Known Allergies

## 2025-06-02 PROCEDURE — 88305 TISSUE EXAM BY PATHOLOGIST: CPT | Performed by: PATHOLOGY

## 2025-06-10 ENCOUNTER — RESULTS FOLLOW-UP (OUTPATIENT)
Age: 75
End: 2025-06-10

## 2025-06-17 ENCOUNTER — TELEPHONE (OUTPATIENT)
Age: 75
End: 2025-06-17

## 2025-06-17 NOTE — TELEPHONE ENCOUNTER
Patients wife wanted to know why her  was getting a AWV and she wanted to know if it could be changed to an AP visit because she did not feel her  would get the same comprehensive examination by doing the AWV compared to an AP visit. Informed the patients wife, Nicole that she should not worry her  would be gettingt he best care and examination and vitals taken as if he were getting a comprehensive exam just at the providers discretion. Also, reminded Nicole that her  is still up to date with his labs and will not need further labs until about October, unless advised due to some concern by the provider. Patient will be coming in for AWV on 06/25

## 2025-06-18 DIAGNOSIS — N40.1 BPH ASSOCIATED WITH NOCTURIA: ICD-10-CM

## 2025-06-18 DIAGNOSIS — R35.1 BPH ASSOCIATED WITH NOCTURIA: ICD-10-CM

## 2025-06-19 RX ORDER — TAMSULOSIN HYDROCHLORIDE 0.4 MG/1
CAPSULE ORAL
Qty: 90 CAPSULE | Refills: 1 | Status: SHIPPED | OUTPATIENT
Start: 2025-06-19

## 2025-08-04 ENCOUNTER — OFFICE VISIT (OUTPATIENT)
Dept: UROLOGY | Facility: CLINIC | Age: 75
End: 2025-08-04
Payer: COMMERCIAL

## 2025-08-04 VITALS
HEART RATE: 68 BPM | SYSTOLIC BLOOD PRESSURE: 144 MMHG | HEIGHT: 70 IN | BODY MASS INDEX: 30.64 KG/M2 | WEIGHT: 214 LBS | OXYGEN SATURATION: 97 % | DIASTOLIC BLOOD PRESSURE: 80 MMHG

## 2025-08-04 DIAGNOSIS — R35.1 BPH ASSOCIATED WITH NOCTURIA: Primary | ICD-10-CM

## 2025-08-04 DIAGNOSIS — N40.1 BPH ASSOCIATED WITH NOCTURIA: Primary | ICD-10-CM

## 2025-08-04 DIAGNOSIS — G47.33 OSA (OBSTRUCTIVE SLEEP APNEA): ICD-10-CM

## 2025-08-04 LAB — POST-VOID RESIDUAL VOLUME, ML POC: 20 ML

## 2025-08-04 PROCEDURE — 51798 US URINE CAPACITY MEASURE: CPT | Performed by: UROLOGY

## 2025-08-04 PROCEDURE — 99213 OFFICE O/P EST LOW 20 MIN: CPT | Performed by: UROLOGY

## 2025-08-05 ENCOUNTER — TELEPHONE (OUTPATIENT)
Age: 75
End: 2025-08-05

## (undated) DEVICE — PAD GROUNDING ADULT

## (undated) DEVICE — ACE WRAP 6 IN STERILE

## (undated) DEVICE — LIGHT HANDLE COVER SLEEVE DISP BLUE STELLAR

## (undated) DEVICE — SUT MONOCRYL 4-0 PS-2 18 IN Y496G

## (undated) DEVICE — TUBING SUCTION 5MM X 12 FT

## (undated) DEVICE — OCCLUSIVE GAUZE STRIP,3% BISMUTH TRIBROMOPHENATE IN PETROLATUM BLEND: Brand: XEROFORM

## (undated) DEVICE — 4-PORT MANIFOLD: Brand: NEPTUNE 2

## (undated) DEVICE — SCD SEQUENTIAL COMPRESSION COMFORT SLEEVE MEDIUM KNEE LENGTH: Brand: KENDALL SCD

## (undated) DEVICE — DRESSING EAR GLASSCOCK ADULT LRG

## (undated) DEVICE — IMPERVIOUS STOCKINETTE: Brand: DEROYAL

## (undated) DEVICE — NEEDLE 22 G X 1 1/2 SAFETY

## (undated) DEVICE — 3M™ STERI-STRIP™ REINFORCED ADHESIVE SKIN CLOSURES, R1546, 1/4 IN X 4 IN (6 MM X 100 MM), 10 STRIPS/ENVELOPE: Brand: 3M™ STERI-STRIP™

## (undated) DEVICE — FEATHERVAC DISPOSABLE STAPES SEAT: Brand: FEATHERVAC DISPOSABLE STAPES SEAT

## (undated) DEVICE — GLOVE SRG BIOGEL 9

## (undated) DEVICE — DRESSING MEPILEX AG BORDER POST-OP 4 X 8 IN

## (undated) DEVICE — TUBING IRD400 5PK IPC LOW PRO IRRIGATION: Brand: MIDAS REX®

## (undated) DEVICE — SUT PROLENE 2-0 CT-2 30 IN 8411H

## (undated) DEVICE — BLADE SHAVER TORPEDO 4MM 13CM  COOLCUT

## (undated) DEVICE — GLOVE INDICATOR PI UNDERGLOVE SZ 9 BLUE

## (undated) DEVICE — CUFF TOURNIQUET 34 X 4 IN QUICK CONNECT DISP 1BLA

## (undated) DEVICE — BLADE SHAVER DISSECTOR 3.5MM 13CM COOLCUT

## (undated) DEVICE — INTENDED FOR TISSUE SEPARATION, AND OTHER PROCEDURES THAT REQUIRE A SHARP SURGICAL BLADE TO PUNCTURE OR CUT.: Brand: BARD-PARKER SAFETY BLADES SIZE 15, STERILE

## (undated) DEVICE — GELFOAM 100

## (undated) DEVICE — CHLORAPREP HI-LITE 26ML ORANGE

## (undated) DEVICE — PROXIMATE SKIN STAPLERS (35 WIDE) CONTAINS 35 STAINLESS STEEL STAPLES (FIXED HEAD): Brand: PROXIMATE

## (undated) DEVICE — FIBER LASER 0.25MM 150CM BEAMPATH OTO-S

## (undated) DEVICE — ELECTRODE 8227410 PAIRED 2 CH SET ROHS

## (undated) DEVICE — STRL PENROSE DRAIN 18" X 1/4": Brand: CARDINAL HEALTH

## (undated) DEVICE — GLOVE SRG BIOGEL 7

## (undated) DEVICE — PROBE ABLATION  APOLLO RF 90 DEG MULTI PORT

## (undated) DEVICE — ACE WRAP 6 IN UNSTERILE

## (undated) DEVICE — 3000CC GUARDIAN II: Brand: GUARDIAN

## (undated) DEVICE — TONGUE DEPRESSOR STERILE

## (undated) DEVICE — BETHLEHEM UNIVERSAL MINOR GEN: Brand: CARDINAL HEALTH

## (undated) DEVICE — SYRINGE 3ML LL

## (undated) DEVICE — ADHESIVE SKIN HIGH VISCOSITY EXOFIN 1ML

## (undated) DEVICE — LIGHT GLOVE GREEN

## (undated) DEVICE — SUT VICRYL 2-0 REEL 54 IN J286G

## (undated) DEVICE — DRAPE EQUIPMENT RF WAND

## (undated) DEVICE — ABDOMINAL PAD: Brand: DERMACEA

## (undated) DEVICE — BUR 3155646 OTO-FLEX .6MM DIAMD BLUE/WH

## (undated) DEVICE — BETHLEHEM UNIVERSAL  ARTHRO PK: Brand: CARDINAL HEALTH

## (undated) DEVICE — STERILE EAR PACK: Brand: CARDINAL HEALTH

## (undated) DEVICE — GLOVE SRG BIOGEL 6.5

## (undated) DEVICE — DRESSING MEPILEX AG BORDER POST-OP 4 X 10 IN

## (undated) DEVICE — INTENDED FOR TISSUE SEPARATION, AND OTHER PROCEDURES THAT REQUIRE A SHARP SURGICAL BLADE TO PUNCTURE OR CUT.: Brand: BARD-PARKER SAFETY BLADES SIZE 11, STERILE

## (undated) DEVICE — INVIEW CLEAR LEGGINGS: Brand: CONVERTORS

## (undated) DEVICE — GAUZE SPONGES,16 PLY: Brand: CURITY

## (undated) DEVICE — SURGIFOAM 8.5 X 12.5

## (undated) DEVICE — ASTOUND SURGICAL GOWN, XXX LARGE, X-LONG: Brand: CONVERTORS

## (undated) DEVICE — DRAPE CRANI

## (undated) DEVICE — NEEDLE 18 G X 1 1/2 SAFETY

## (undated) DEVICE — COTTON BALLS: Brand: DEROYAL

## (undated) DEVICE — GLOVE INDICATOR PI UNDERGLOVE SZ 7 BLUE

## (undated) DEVICE — PLUMEPEN PRO 10FT

## (undated) DEVICE — TOWEL SET X-RAY

## (undated) DEVICE — SURGICEL 4 X 8

## (undated) DEVICE — DRAPE SURGIKIT SADDLE BAG

## (undated) DEVICE — SUT ETHILON 4-0 PS-2 18 IN 1667H

## (undated) DEVICE — BIPOLAR CORD DISP

## (undated) DEVICE — POOLE SUCTION HANDLE: Brand: CARDINAL HEALTH

## (undated) DEVICE — SUT VICRYL 0 UR-6 27 IN J603H

## (undated) DEVICE — WIPES INSTRUMENT 3 X 3IN MEROCEL

## (undated) DEVICE — SUT VICRYL 3-0 SH 27 IN J416H

## (undated) DEVICE — TUBING ARTHROSCOPIC WAVE  MAIN PUMP

## (undated) DEVICE — IV CATH INTROCAN 18G X 1 1/4 SAFETY